# Patient Record
Sex: FEMALE | Race: ASIAN | NOT HISPANIC OR LATINO | Employment: OTHER | URBAN - NONMETROPOLITAN AREA
[De-identification: names, ages, dates, MRNs, and addresses within clinical notes are randomized per-mention and may not be internally consistent; named-entity substitution may affect disease eponyms.]

---

## 2017-01-03 ENCOUNTER — OFFICE VISIT (OUTPATIENT)
Dept: FAMILY MEDICINE CLINIC | Facility: CLINIC | Age: 72
End: 2017-01-03

## 2017-01-03 VITALS
DIASTOLIC BLOOD PRESSURE: 70 MMHG | OXYGEN SATURATION: 97 % | HEIGHT: 60 IN | BODY MASS INDEX: 24.94 KG/M2 | TEMPERATURE: 99.3 F | SYSTOLIC BLOOD PRESSURE: 120 MMHG | HEART RATE: 83 BPM | WEIGHT: 127 LBS

## 2017-01-03 DIAGNOSIS — R94.4 DECREASED GFR: ICD-10-CM

## 2017-01-03 DIAGNOSIS — M19.90 ARTHRITIS: ICD-10-CM

## 2017-01-03 DIAGNOSIS — J06.9 ACUTE URI: Primary | ICD-10-CM

## 2017-01-03 PROCEDURE — 99214 OFFICE O/P EST MOD 30 MIN: CPT | Performed by: NURSE PRACTITIONER

## 2017-01-03 RX ORDER — FLUTICASONE PROPIONATE 50 MCG
SPRAY, SUSPENSION (ML) NASAL
Qty: 48 ML | Refills: 5 | Status: SHIPPED | OUTPATIENT
Start: 2017-01-03 | End: 2017-01-25 | Stop reason: SDUPTHER

## 2017-01-03 RX ORDER — TRAMADOL HYDROCHLORIDE 50 MG/1
50 TABLET ORAL EVERY 8 HOURS PRN
Qty: 60 TABLET | Refills: 2 | Status: SHIPPED | OUTPATIENT
Start: 2017-01-03 | End: 2017-04-06 | Stop reason: SDUPTHER

## 2017-01-03 RX ORDER — CEFDINIR 300 MG/1
300 CAPSULE ORAL 2 TIMES DAILY
Qty: 20 CAPSULE | Refills: 0 | Status: SHIPPED | OUTPATIENT
Start: 2017-01-03 | End: 2017-01-13

## 2017-01-03 RX ORDER — FLUTICASONE PROPIONATE 50 MCG
1 SPRAY, SUSPENSION (ML) NASAL 2 TIMES DAILY
Qty: 1 EACH | Refills: 5 | Status: SHIPPED | OUTPATIENT
Start: 2017-01-03 | End: 2017-01-03 | Stop reason: SDUPTHER

## 2017-01-03 NOTE — PROGRESS NOTES
Subjective   Tyler Cabello is a 71 y.o. female.     History of Present Illness     URI  For past few weeks. Coughing up thick yellow and green sputum. She has taken OTC    Arthritis pain in back: She has had a neuro evaluation and Spinal surgery several years ago. Pain is located in her neck, mid and low back. She also has pain in her hands at times.   Tyler Cabello rates pain today as 4 on a scale of 0-10. Patient states that pain is reduced by at least one half with current Medication/Treatment. She states that use of ultram and prn motrin help her be more active. Pain does interfere with ability to perform daily activities as well as enjoyment of life. Patient reports quality of life and mobility has improved as a result of current treatment. Patient describes pain as sharp, stiffness and throbbing. Pain is made worse from going up and down stairs, rising after sitting, standing and walking. Pain is eased by heat, medication and rest. [facility] urine drug screen will be obtained as indicated. Tyler enjoys time with her grandchild and sowing .      Restless leg syndrome - patient with onset of restless leg syndrome approximately 5 years ago. The symptoms have been controlled with Requip. Patient states she has had a huge improvement in her quality of sleep with the addition of her Requip. She understands the risk.       Review recent labs - no history of decreased renal function. She does state that she has been taking daily ibuprofen to help ease her arthritis pain.       The following portions of the patient's history were reviewed and updated as appropriate: allergies, current medications, past family history, past medical history, past social history, past surgical history and problem list.    Review of Systems   Constitutional: Positive for chills. Negative for activity change, appetite change, fatigue and fever.   HENT: Positive for congestion and sinus pressure. Negative for ear pain, facial swelling, hearing  loss, sore throat, trouble swallowing and voice change.    Eyes: Negative for pain, discharge and visual disturbance.   Respiratory: Negative for apnea, cough, chest tightness, shortness of breath and wheezing.    Cardiovascular: Negative for chest pain, palpitations and leg swelling.   Gastrointestinal: Negative for abdominal pain, blood in stool, constipation, diarrhea, nausea and vomiting.   Genitourinary: Negative for dysuria.   Musculoskeletal: Positive for arthralgias, back pain and neck pain. Negative for neck stiffness.   Skin: Negative for color change.   Neurological: Negative for headaches.   Psychiatric/Behavioral: Negative for confusion and suicidal ideas. The patient is not nervous/anxious.    All other systems reviewed and are negative.      Objective   Physical Exam   Constitutional: She is oriented to person, place, and time. She appears well-developed and well-nourished. No distress.   HENT:   Head: Normocephalic and atraumatic.   Right Ear: External ear normal. Tympanic membrane is bulging.   Left Ear: External ear normal. Tympanic membrane is bulging.   Nose: Mucosal edema present. Right sinus exhibits maxillary sinus tenderness. Left sinus exhibits maxillary sinus tenderness.   Mouth/Throat: Oropharynx is clear and moist. No oropharyngeal exudate.   Very tender maxillary sinus bilateral    Eyes: Conjunctivae and EOM are normal. Pupils are equal, round, and reactive to light.   Neck: Normal range of motion. Neck supple. No thyromegaly present.   Cardiovascular: Normal rate, regular rhythm, normal heart sounds and intact distal pulses.    No murmur heard.  Pulmonary/Chest: Effort normal and breath sounds normal. No respiratory distress. She has no wheezes. She has no rales. She exhibits no tenderness.   Abdominal: Soft. Normal appearance and bowel sounds are normal. She exhibits no distension and no mass. There is no tenderness. There is no rebound and no guarding.   Musculoskeletal:        Lumbar  back: She exhibits decreased range of motion, tenderness, pain and spasm.   Decreased lumbar curvature, there is pain with forward flexion. DTR's +2. No edema.    Neurological: She is alert and oriented to person, place, and time. She has normal reflexes.   Skin: Skin is warm and dry. No rash noted. She is not diaphoretic. No erythema. No pallor.   Psychiatric: She has a normal mood and affect. Her speech is normal and behavior is normal. Judgment and thought content normal. Her affect is not inappropriate. Cognition and memory are normal.   Denies thoughts of hurting self or others.   Nursing note and vitals reviewed.      Assessment/Plan   Tyler was seen today for osteoarthritis, hypertension, restless legs syndrome and allergic rhinitis.    Diagnoses and all orders for this visit:    Acute URI    Arthritis  Comments:  body mechanics   refill ultram  aicha and uds on file  Orders:  -     traMADol (ULTRAM) 50 MG tablet; Take 1 tablet by mouth Every 8 (Eight) Hours As Needed for moderate pain (4-6).  -     Urine Drug Screen    Decreased GFR    Other orders  -     cefdinir (OMNICEF) 300 MG capsule; Take 1 capsule by mouth 2 (Two) Times a Day for 10 days.  -     Discontinue: fluticasone (FLONASE) 50 MCG/ACT nasal spray; 1 spray into each nostril 2 (Two) Times a Day. Administer 1 spray in each nostril for each dose.      Will repeat labs in three months. Stop all nsaid use as discussed today. Will refer as needed.  Labs reviewed and discussed.  I have discussed diagnosis in detail today allowing time for questions and answers. Pt is aware of reasons to seek urgent or emergent medical care as well as reasons to return to the clinic for evaluation. Possible side effects, interactions and progression of symptoms discussed as well. Pt / family states understanding.   Aicha and uds are on file as consistent. Repeat today.   Follow up 2-3 months, repeat labs one week prior.   RTC 2-5 days if not improved, sooner if  condition worsens/changes. Symptomatic care advised as well as reasons for urgent or emergent care. Pt / family state understanding.

## 2017-01-03 NOTE — MR AVS SNAPSHOT
Tyler Irineo   1/3/2017 2:00 PM   Office Visit    Dept Phone:  [phone]   Encounter #:  [id number]    Provider:  ZHOU Villar   Department:  [facility]                Your Full Care Plan              Today's Medication Changes          These changes are accurate as of: 1/3/17  2:26 PM.  If you have any questions, ask your nurse or doctor.               New Medication(s)Ordered:     cefdinir 300 MG capsule   Commonly known as:  OMNICEF   Take 1 capsule by mouth 2 (Two) Times a Day for 10 days.   Started by:  ZHOU Villar            Where to Get Your Medications      These medications were sent to [facility] [address] [place] [address] AT [facility] [address] [facility] - [phone]  - [phone]  [address] [facility] [address]     Phone:  [phone]     cefdinir 300 MG capsule    fluticasone 50 MCG/ACT nasal spray         You can get these medications from any pharmacy     Bring a paper prescription for each of these medications     traMADol 50 MG tablet                  Your Updated Medication List          This list is accurate as of: 1/3/17  2:26 PM.  Always use your most recent med list.                baclofen 10 MG tablet   Commonly known as:  LIORESAL   Take 1 tablet by mouth daily as needed for muscle spasms.       benzonatate 200 MG capsule   Commonly known as:  TESSALON   Take 1 capsule by mouth 3 (Three) Times a Day As Needed for cough.       cefdinir 300 MG capsule   Commonly known as:  OMNICEF   Take 1 capsule by mouth 2 (Two) Times a Day for 10 days.       cetirizine 10 MG tablet   Commonly known as:  zyrTEC   Take 1 tablet by mouth daily.       cyclobenzaprine 5 MG tablet   Commonly known as:  FLEXERIL   Take 1 tablet by mouth 2 (two) times a day as needed for muscle spasms.       esomeprazole 40 MG capsule   Commonly known as:  nexIUM   Take 1 capsule  by mouth daily.       fluticasone 50 MCG/ACT nasal spray   Commonly known as:  FLONASE   1 spray into each nostril 2 (Two) Times a Day. Administer 1 spray in each nostril for each dose.       furosemide 20 MG tablet   Commonly known as:  LASIX   Take 1 tablet by mouth daily as needed (for edema).       ibuprofen 800 MG tablet   Commonly known as:  ADVIL,MOTRIN   Take 1 tablet by mouth 3 (three) times a day as needed for mild pain (1-3) or moderate pain (4-6).       linaclotide 290 MCG capsule capsule   Commonly known as:  LINZESS   Take 290 mcg by mouth every morning before breakfast.       losartan 50 MG tablet   Commonly known as:  COZAAR   Take 1 tablet by mouth daily.       lubiprostone 8 MCG capsule   Commonly known as:  AMITIZA       montelukast 10 MG tablet   Commonly known as:  SINGULAIR   Take 1 tablet by mouth every night.       raNITIdine 150 MG tablet   Commonly known as:  ZANTAC   Take 1 tablet by mouth 2 (two) times a day.       rOPINIRole 1 MG tablet   Commonly known as:  REQUIP   Take 1 tablet by mouth every night. Take 1 hour before bedtime.       traMADol 50 MG tablet   Commonly known as:  ULTRAM   Take 1 tablet by mouth Every 8 (Eight) Hours As Needed for moderate pain (4-6).       verapamil  MG CR tablet   Commonly known as:  CALAN-SR   Take 1 tablet by mouth daily.               We Performed the Following     Urine Drug Screen       You Were Diagnosed With        Codes Comments    Arthritis     ICD-10-CM: M19.90  ICD-9-CM: 716.90 body mechanics   refill ultram  aicha and uds on file      Instructions     None    Patient Instructions History      Upcoming Appointments     Visit Type Date Time Department    OFFICE VISIT 1/3/2017  2:00 PM [facility]    LAB 3/27/2017  9:00 AM [facility]    OFFICE VISIT 4/3/2017  2:[facility]     Our records indicate that you have declined [facility] signup. If you would like to sign up for  "Demond, please email [email or web address] or call [phone] to obtain an activation code.             Other Info from Your Visit           Your Appointments     Mar 27, 2017  9:00 AM EDT   Lab with [facility] (--)    [address] [place] [id number] [phone]            Apr 03, 2017  2:00 PM EDT   Office Visit with ZHOU Villar   [facility] (--)    [address] [place] [id number] [phone]           Please arrive 10 minutes early, bring a complete list of all medications and bring any previous records or diagnostic testing results.              Allergies     Latex      Sulfa Antibiotics        Reason for Visit     Osteoarthritis     Hypertension     Restless Legs Syndrome     Allergic Rhinitis           Vital Signs     Blood Pressure Pulse Temperature Height Weight Last Menstrual Period    120/70 (BP Location: Right arm, Patient Position: Sitting) 83 99.3 °F (37.4 °C) (Tympanic) 60\" (152.4 cm) 127 lb (57.6 kg) 07/08/1986 (Within Months)    Oxygen Saturation Body Mass Index Smoking Status             97% 24.8 kg/m2 Former Smoker         Problems and Diagnoses Noted     Arthritis            "

## 2017-01-25 ENCOUNTER — OFFICE VISIT (OUTPATIENT)
Dept: FAMILY MEDICINE CLINIC | Facility: CLINIC | Age: 72
End: 2017-01-25

## 2017-01-25 VITALS
DIASTOLIC BLOOD PRESSURE: 90 MMHG | HEIGHT: 60 IN | TEMPERATURE: 100.7 F | HEART RATE: 95 BPM | OXYGEN SATURATION: 98 % | BODY MASS INDEX: 25.32 KG/M2 | SYSTOLIC BLOOD PRESSURE: 130 MMHG | WEIGHT: 129 LBS

## 2017-01-25 DIAGNOSIS — R68.89 FLU-LIKE SYMPTOMS: Primary | ICD-10-CM

## 2017-01-25 DIAGNOSIS — M19.90 ARTHRITIS: ICD-10-CM

## 2017-01-25 LAB
EXPIRATION DATE: NORMAL
EXPIRATION DATE: NORMAL
FLUAV AG NPH QL: NORMAL
FLUBV AG NPH QL: NORMAL
INTERNAL CONTROL: NORMAL
INTERNAL CONTROL: NORMAL
Lab: NORMAL
Lab: NORMAL
S PYO AG THROAT QL: NEGATIVE

## 2017-01-25 PROCEDURE — 87880 STREP A ASSAY W/OPTIC: CPT | Performed by: NURSE PRACTITIONER

## 2017-01-25 PROCEDURE — 87804 INFLUENZA ASSAY W/OPTIC: CPT | Performed by: NURSE PRACTITIONER

## 2017-01-25 PROCEDURE — 96372 THER/PROPH/DIAG INJ SC/IM: CPT | Performed by: NURSE PRACTITIONER

## 2017-01-25 PROCEDURE — 99213 OFFICE O/P EST LOW 20 MIN: CPT | Performed by: NURSE PRACTITIONER

## 2017-01-25 RX ORDER — KETOROLAC TROMETHAMINE 30 MG/ML
60 INJECTION, SOLUTION INTRAMUSCULAR; INTRAVENOUS ONCE
Status: COMPLETED | OUTPATIENT
Start: 2017-01-25 | End: 2017-01-25

## 2017-01-25 RX ORDER — FLUTICASONE PROPIONATE 50 MCG
1 SPRAY, SUSPENSION (ML) NASAL DAILY
Qty: 48 ML | Refills: 5 | Status: SHIPPED | OUTPATIENT
Start: 2017-01-25 | End: 2017-10-11

## 2017-01-25 RX ORDER — AMOXICILLIN AND CLAVULANATE POTASSIUM 500; 125 MG/1; MG/1
1 TABLET, FILM COATED ORAL 2 TIMES DAILY
Qty: 20 TABLET | Refills: 0 | Status: SHIPPED | OUTPATIENT
Start: 2017-01-25 | End: 2017-04-06

## 2017-01-25 RX ORDER — CEFTRIAXONE 1 G/1
1 INJECTION, POWDER, FOR SOLUTION INTRAMUSCULAR; INTRAVENOUS EVERY 24 HOURS
Status: DISCONTINUED | OUTPATIENT
Start: 2017-01-25 | End: 2017-04-06

## 2017-01-25 RX ORDER — IBUPROFEN 800 MG/1
800 TABLET ORAL EVERY 6 HOURS PRN
Qty: 30 TABLET | Refills: 0 | Status: SHIPPED | OUTPATIENT
Start: 2017-01-25 | End: 2017-04-06 | Stop reason: SDUPTHER

## 2017-01-25 RX ADMIN — CEFTRIAXONE 1 G: 1 INJECTION, POWDER, FOR SOLUTION INTRAMUSCULAR; INTRAVENOUS at 11:56

## 2017-01-25 RX ADMIN — KETOROLAC TROMETHAMINE 60 MG: 30 INJECTION, SOLUTION INTRAMUSCULAR; INTRAVENOUS at 11:58

## 2017-01-25 NOTE — MR AVS SNAPSHOT
Tyler Cabello   1/25/2017 10:00 AM   Office Visit    Dept Phone:  [phone]   Encounter #:  [id number]    Provider:  ZHOU Villar   Department:  [facility]                Your Full Care Plan              Your Updated Medication List          This list is accurate as of: 1/25/17 11:19 AM.  Always use your most recent med list.                baclofen 10 MG tablet   Commonly known as:  LIORESAL   Take 1 tablet by mouth daily as needed for muscle spasms.       benzonatate 200 MG capsule   Commonly known as:  TESSALON   Take 1 capsule by mouth 3 (Three) Times a Day As Needed for cough.       cetirizine 10 MG tablet   Commonly known as:  zyrTEC   Take 1 tablet by mouth daily.       cyclobenzaprine 5 MG tablet   Commonly known as:  FLEXERIL   Take 1 tablet by mouth 2 (two) times a day as needed for muscle spasms.       esomeprazole 40 MG capsule   Commonly known as:  nexIUM   Take 1 capsule by mouth daily.       fluticasone 50 MCG/ACT nasal spray   Commonly known as:  FLONASE   INSTILL 1 SPRAY IN EACH NOSTRIL TWICE DAILY       furosemide 20 MG tablet   Commonly known as:  LASIX   Take 1 tablet by mouth daily as needed (for edema).       ibuprofen 800 MG tablet   Commonly known as:  ADVIL,MOTRIN   Take 1 tablet by mouth 3 (three) times a day as needed for mild pain (1-3) or moderate pain (4-6).       linaclotide 290 MCG capsule capsule   Commonly known as:  LINZESS   Take 290 mcg by mouth every morning before breakfast.       losartan 50 MG tablet   Commonly known as:  COZAAR   Take 1 tablet by mouth daily.       lubiprostone 8 MCG capsule   Commonly known as:  AMITIZA       montelukast 10 MG tablet   Commonly known as:  SINGULAIR   Take 1 tablet by mouth every night.       raNITIdine 150 MG tablet   Commonly known as:  ZANTAC   Take 1 tablet by mouth 2 (two) times a day.       rOPINIRole 1 MG tablet   Commonly known as:  REQUIP   Take 1 tablet  "by mouth every night. Take 1 hour before bedtime.       traMADol 50 MG tablet   Commonly known as:  ULTRAM   Take 1 tablet by mouth Every 8 (Eight) Hours As Needed for moderate pain (4-6).       verapamil  MG CR tablet   Commonly known as:  CALAN-SR   Take 1 tablet by mouth daily.               Instructions     None    Patient Instructions History      Upcoming Appointments     Visit Type Date Time Department    OFFICE VISIT 1/25/2017 10:00 AM [facility]    LAB 3/27/2017  9:00 AM [facility]    OFFICE VISIT 4/3/2017  9:20 AM [facility]      MyChart Signup     Our records indicate that you have declined [facility] signup. If you would like to sign up for [employer], please email Personal [email or web address] or call [phone] to obtain an activation code.             Other Info from Your Visit           Your Appointments     Mar 27, 2017  9:00 AM EDT   Lab with [facility] (--)    [address] [place] [id number]   [phone]            Apr 03, 2017  9:20 AM EDT   Office Visit with ZHOU Villar   [facility] (--)    [address] [place] [address]   [phone]           Please arrive 10 minutes early, bring a complete list of all medications and bring any previous records or diagnostic testing results.              Allergies     Latex      Sulfa Antibiotics        Reason for Visit     URI           Vital Signs     Blood Pressure Pulse Temperature Height Weight    130/90 (BP Location: Left arm, Patient Position: Sitting, Cuff Size: Adult) 95 100.7 °F (38.2 °C) (Tympanic) 60\" (152.4 cm) 129 lb (58.5 kg)    Last Menstrual Period Oxygen Saturation Body Mass Index Smoking Status       07/08/1986 (Within Months) 98% 25.19 kg/m2 Former Smoker         "

## 2017-01-25 NOTE — PROGRESS NOTES
Cathryn Cabello is a 71 y.o. female.     History of Present Illness     Patient is here today complaining of sudden onset of flu like illness yesterday. She has taken some Tylenol cold medication today. Sore throat, denies trouble breathing or swallowing. Reports fever and body aches all over. Is drinking water, no appetite. No GI symptoms.       The following portions of the patient's history were reviewed and updated as appropriate: allergies, current medications, past family history, past medical history, past social history, past surgical history and problem list.    Review of Systems   Constitutional: Positive for activity change, appetite change, chills, fatigue and fever.   HENT: Positive for congestion, sore throat and voice change (hoarsness ). Negative for facial swelling, mouth sores and trouble swallowing.    Eyes: Visual disturbance: wears glasses, no acute changes in vision    Respiratory: Positive for cough (productive cough ). Negative for chest tightness, shortness of breath and wheezing.    Cardiovascular: Negative for chest pain, palpitations and leg swelling.   Gastrointestinal: Positive for nausea. Negative for abdominal pain, blood in stool, diarrhea and vomiting. Constipation: history of chronic constipation, she is having bowel movement at least every three days    Genitourinary: Negative for dysuria, flank pain and urgency.   Musculoskeletal: Positive for arthralgias, back pain, joint swelling (hands ) and neck pain. Negative for neck stiffness.   Skin: Negative for rash.   Allergic/Immunologic: Positive for environmental allergies. Negative for food allergies and immunocompromised state.   Neurological: Positive for weakness (all over ) and headaches (mild headache top of head for one day). Negative for syncope.   Hematological: Does not bruise/bleed easily.   Psychiatric/Behavioral: Negative for confusion, self-injury and suicidal ideas. The patient is not nervous/anxious.    All  other systems reviewed and are negative.      Objective   Physical Exam   Constitutional: She is oriented to person, place, and time. She appears well-developed and well-nourished. She appears ill. No distress.   Febrile and mildly elevated blood pressure    HENT:   Head: Normocephalic and atraumatic.   Right Ear: Hearing, tympanic membrane, external ear and ear canal normal.   Left Ear: Hearing, tympanic membrane, external ear and ear canal normal.   Nose: Mucosal edema present. Right sinus exhibits maxillary sinus tenderness. Left sinus exhibits maxillary sinus tenderness.   Mouth/Throat: Uvula is midline. Oropharyngeal exudate (gray exudate ) and posterior oropharyngeal erythema present. No posterior oropharyngeal edema.   Eyes: Conjunctivae, EOM and lids are normal. Pupils are equal, round, and reactive to light.   Neck: Trachea normal and normal range of motion. Neck supple. Muscular tenderness present. No rigidity. No thyromegaly present.   Cardiovascular: Normal rate, regular rhythm, normal heart sounds and intact distal pulses.    No murmur heard.  Pulmonary/Chest: Effort normal and breath sounds normal. No respiratory distress. She has no wheezes. She has no rales. She exhibits no tenderness.   Abdominal: Soft. Normal appearance and bowel sounds are normal. She exhibits no distension and no mass. There is no tenderness. There is no rebound and no guarding.   Musculoskeletal:        Lumbar back: She exhibits decreased range of motion, tenderness, pain and spasm.   Decreased lumbar curvature, there is pain with forward flexion. DTR's +2. No edema.    Neurological: She is alert and oriented to person, place, and time. She has normal reflexes.   Skin: Skin is warm and dry. No rash noted. She is not diaphoretic. No erythema. No pallor.   Psychiatric: She has a normal mood and affect. Her speech is normal and behavior is normal. Judgment and thought content normal. Her affect is not inappropriate. Cognition and  memory are normal.   Denies thoughts of hurting self or others.   Nursing note and vitals reviewed.      Assessment/Plan   Tyler was seen today for uri.    Diagnoses and all orders for this visit:    Flu-like symptoms  -     POC Influenza A / B - negative though pt presents as Influenza  -     POC Rapid Strep A - negative  -     ibuprofen (ADVIL,MOTRIN) 800 MG tablet; Take 1 tablet by mouth Every 6 (Six) Hours As Needed for fever.  Toradol 60 mg IM today for body aches.  Rocephin 1 gram IM today, tolerated well.     Other orders  -     amoxicillin-clavulanate (AUGMENTIN) 500-125 MG per tablet; Take 1 tablet by mouth 2 (Two) Times a Day.  -     fluticasone (FLONASE) 50 MCG/ACT nasal spray; 1 spray into each nostril Daily.      Steam therapy, rest, fluids, symptomatic treatment. Rotate tylenol and motrin for body aches and fever.   Reviewed disease process, possible complications, reasons for urgent care and possible side effects of medications. Pt/family state understanding.   RTC 2-5 days if not improved, sooner if condition worsens/changes. Symptomatic care advised as well as reasons for urgent or emergent care. Pt / family state understanding.   I have instructed pt if her symptoms worsen or change to call 911 or go immediately to the nearest emergency room.

## 2017-03-27 ENCOUNTER — LAB (OUTPATIENT)
Dept: FAMILY MEDICINE CLINIC | Facility: CLINIC | Age: 72
End: 2017-03-27

## 2017-03-27 DIAGNOSIS — R94.4 DECREASED GFR: ICD-10-CM

## 2017-03-27 DIAGNOSIS — G25.81 RESTLESS LEG SYNDROME: ICD-10-CM

## 2017-03-27 DIAGNOSIS — M19.90 ARTHRITIS: ICD-10-CM

## 2017-03-27 LAB
ALBUMIN SERPL-MCNC: 4.5 G/DL (ref 3.4–4.8)
ALBUMIN/GLOB SERPL: 1.8 G/DL (ref 1.5–2.5)
ALP SERPL-CCNC: 55 U/L (ref 35–104)
ALT SERPL W P-5'-P-CCNC: 23 U/L (ref 10–36)
ANION GAP SERPL CALCULATED.3IONS-SCNC: 2.5 MMOL/L (ref 3.6–11.2)
AST SERPL-CCNC: 22 U/L (ref 10–30)
BASOPHILS # BLD AUTO: 0.05 10*3/MM3 (ref 0–0.3)
BASOPHILS NFR BLD AUTO: 0.8 % (ref 0–2)
BILIRUB SERPL-MCNC: 0.3 MG/DL (ref 0.2–1.8)
BUN BLD-MCNC: 17 MG/DL (ref 7–21)
BUN/CREAT SERPL: 19.5 (ref 7–25)
CALCIUM SPEC-SCNC: 9.6 MG/DL (ref 7.7–10)
CHLORIDE SERPL-SCNC: 110 MMOL/L (ref 99–112)
CHOLEST SERPL-MCNC: 197 MG/DL (ref 0–200)
CO2 SERPL-SCNC: 29.5 MMOL/L (ref 24.3–31.9)
CREAT BLD-MCNC: 0.87 MG/DL (ref 0.43–1.29)
DEPRECATED RDW RBC AUTO: 44 FL (ref 37–54)
EOSINOPHIL # BLD AUTO: 0.14 10*3/MM3 (ref 0–0.7)
EOSINOPHIL NFR BLD AUTO: 2.3 % (ref 0–7)
ERYTHROCYTE [DISTWIDTH] IN BLOOD BY AUTOMATED COUNT: 13.3 % (ref 11.5–14.5)
GFR SERPL CREATININE-BSD FRML MDRD: 64 ML/MIN/1.73
GLOBULIN UR ELPH-MCNC: 2.5 GM/DL
GLUCOSE BLD-MCNC: 104 MG/DL (ref 70–110)
HBA1C MFR BLD: 6.2 % (ref 4.5–5.7)
HCT VFR BLD AUTO: 40.4 % (ref 37–47)
HDLC SERPL-MCNC: 46 MG/DL (ref 60–100)
HGB BLD-MCNC: 13.1 G/DL (ref 12–16)
IMM GRANULOCYTES # BLD: 0 10*3/MM3 (ref 0–0.03)
IMM GRANULOCYTES NFR BLD: 0 % (ref 0–0.5)
LDLC SERPL CALC-MCNC: 131 MG/DL (ref 0–100)
LDLC/HDLC SERPL: 2.85 {RATIO}
LYMPHOCYTES # BLD AUTO: 2.55 10*3/MM3 (ref 1–3)
LYMPHOCYTES NFR BLD AUTO: 42.1 % (ref 16–46)
MCH RBC QN AUTO: 30.6 PG (ref 27–33)
MCHC RBC AUTO-ENTMCNC: 32.4 G/DL (ref 33–37)
MCV RBC AUTO: 94.4 FL (ref 80–94)
MONOCYTES # BLD AUTO: 0.44 10*3/MM3 (ref 0.1–0.9)
MONOCYTES NFR BLD AUTO: 7.3 % (ref 0–12)
NEUTROPHILS # BLD AUTO: 2.87 10*3/MM3 (ref 1.4–6.5)
NEUTROPHILS NFR BLD AUTO: 47.5 % (ref 40–75)
OSMOLALITY SERPL CALC.SUM OF ELEC: 285 MOSM/KG (ref 273–305)
PLATELET # BLD AUTO: 296 10*3/MM3 (ref 130–400)
PMV BLD AUTO: 11.1 FL (ref 6–10)
POTASSIUM BLD-SCNC: 4.5 MMOL/L (ref 3.5–5.3)
PROT SERPL-MCNC: 7 G/DL (ref 6–8)
RBC # BLD AUTO: 4.28 10*6/MM3 (ref 4.2–5.4)
SODIUM BLD-SCNC: 142 MMOL/L (ref 135–153)
TRIGL SERPL-MCNC: 99 MG/DL (ref 0–150)
VLDLC SERPL-MCNC: 19.8 MG/DL
WBC NRBC COR # BLD: 6.05 10*3/MM3 (ref 4.5–12.5)

## 2017-03-27 PROCEDURE — 85025 COMPLETE CBC W/AUTO DIFF WBC: CPT | Performed by: NURSE PRACTITIONER

## 2017-03-27 PROCEDURE — 80061 LIPID PANEL: CPT | Performed by: NURSE PRACTITIONER

## 2017-03-27 PROCEDURE — 83036 HEMOGLOBIN GLYCOSYLATED A1C: CPT | Performed by: NURSE PRACTITIONER

## 2017-03-27 PROCEDURE — 36415 COLL VENOUS BLD VENIPUNCTURE: CPT

## 2017-03-27 PROCEDURE — 80053 COMPREHEN METABOLIC PANEL: CPT | Performed by: NURSE PRACTITIONER

## 2017-03-27 RX ORDER — ROPINIROLE 1 MG/1
TABLET, FILM COATED ORAL
Qty: 30 TABLET | Refills: 3 | Status: SHIPPED | OUTPATIENT
Start: 2017-03-27 | End: 2017-07-11 | Stop reason: SDUPTHER

## 2017-04-06 ENCOUNTER — OFFICE VISIT (OUTPATIENT)
Dept: FAMILY MEDICINE CLINIC | Facility: CLINIC | Age: 72
End: 2017-04-06

## 2017-04-06 ENCOUNTER — TELEPHONE (OUTPATIENT)
Dept: FAMILY MEDICINE CLINIC | Facility: CLINIC | Age: 72
End: 2017-04-06

## 2017-04-06 VITALS
TEMPERATURE: 98.4 F | DIASTOLIC BLOOD PRESSURE: 80 MMHG | HEIGHT: 60 IN | BODY MASS INDEX: 26.31 KG/M2 | SYSTOLIC BLOOD PRESSURE: 140 MMHG | WEIGHT: 134 LBS | OXYGEN SATURATION: 98 % | HEART RATE: 75 BPM

## 2017-04-06 DIAGNOSIS — G25.81 RLS (RESTLESS LEGS SYNDROME): ICD-10-CM

## 2017-04-06 DIAGNOSIS — J30.2 OTHER SEASONAL ALLERGIC RHINITIS: ICD-10-CM

## 2017-04-06 DIAGNOSIS — Z79.899 HIGH RISK MEDICATION USE: Primary | ICD-10-CM

## 2017-04-06 DIAGNOSIS — I10 ESSENTIAL HYPERTENSION: ICD-10-CM

## 2017-04-06 DIAGNOSIS — J45.20 MILD INTERMITTENT ASTHMA WITHOUT COMPLICATION: ICD-10-CM

## 2017-04-06 DIAGNOSIS — S09.90XS HEADACHES DUE TO OLD HEAD TRAUMA: ICD-10-CM

## 2017-04-06 DIAGNOSIS — E11.9 TYPE 2 DIABETES MELLITUS WITHOUT COMPLICATION, WITHOUT LONG-TERM CURRENT USE OF INSULIN (HCC): ICD-10-CM

## 2017-04-06 DIAGNOSIS — M19.90 ARTHRITIS: ICD-10-CM

## 2017-04-06 DIAGNOSIS — G25.81 RESTLESS LEG SYNDROME: ICD-10-CM

## 2017-04-06 DIAGNOSIS — E78.2 MIXED HYPERLIPIDEMIA: ICD-10-CM

## 2017-04-06 DIAGNOSIS — K21.00 GASTROESOPHAGEAL REFLUX DISEASE WITH ESOPHAGITIS: ICD-10-CM

## 2017-04-06 DIAGNOSIS — K59.01 SLOW TRANSIT CONSTIPATION: ICD-10-CM

## 2017-04-06 DIAGNOSIS — G44.309 HEADACHES DUE TO OLD HEAD TRAUMA: ICD-10-CM

## 2017-04-06 PROBLEM — E78.5 HYPERLIPIDEMIA: Status: ACTIVE | Noted: 2017-04-06

## 2017-04-06 PROBLEM — J45.909 ASTHMA: Status: ACTIVE | Noted: 2017-04-06

## 2017-04-06 PROBLEM — K59.00 CONSTIPATION: Status: ACTIVE | Noted: 2017-04-06

## 2017-04-06 PROCEDURE — 99214 OFFICE O/P EST MOD 30 MIN: CPT | Performed by: NURSE PRACTITIONER

## 2017-04-06 RX ORDER — FUROSEMIDE 20 MG/1
TABLET ORAL
Qty: 90 TABLET | Refills: 5 | Status: SHIPPED | OUTPATIENT
Start: 2017-04-06 | End: 2017-07-11 | Stop reason: SDDI

## 2017-04-06 RX ORDER — LOSARTAN POTASSIUM 50 MG/1
50 TABLET ORAL DAILY
Qty: 30 TABLET | Refills: 5 | Status: SHIPPED | OUTPATIENT
Start: 2017-04-06 | End: 2017-04-06 | Stop reason: SDUPTHER

## 2017-04-06 RX ORDER — BENZONATATE 200 MG/1
200 CAPSULE ORAL 3 TIMES DAILY PRN
Qty: 30 CAPSULE | Refills: 2 | Status: SHIPPED | OUTPATIENT
Start: 2017-04-06 | End: 2017-07-11

## 2017-04-06 RX ORDER — TRAMADOL HYDROCHLORIDE 50 MG/1
50 TABLET ORAL EVERY 8 HOURS PRN
Qty: 60 TABLET | Refills: 2 | Status: SHIPPED | OUTPATIENT
Start: 2017-04-06 | End: 2017-07-11 | Stop reason: SDUPTHER

## 2017-04-06 RX ORDER — CETIRIZINE HYDROCHLORIDE 10 MG/1
10 TABLET ORAL DAILY
Qty: 30 TABLET | Refills: 5 | Status: SHIPPED | OUTPATIENT
Start: 2017-04-06 | End: 2017-07-11 | Stop reason: SDUPTHER

## 2017-04-06 RX ORDER — RANITIDINE 150 MG/1
150 TABLET ORAL 2 TIMES DAILY
Qty: 60 TABLET | Refills: 5 | Status: SHIPPED | OUTPATIENT
Start: 2017-04-06 | End: 2017-04-06 | Stop reason: SDUPTHER

## 2017-04-06 RX ORDER — RANITIDINE 150 MG/1
TABLET ORAL
Qty: 180 TABLET | Refills: 5 | Status: SHIPPED | OUTPATIENT
Start: 2017-04-06 | End: 2017-07-11 | Stop reason: SDUPTHER

## 2017-04-06 RX ORDER — FUROSEMIDE 20 MG/1
20 TABLET ORAL DAILY PRN
Qty: 30 TABLET | Refills: 5 | Status: SHIPPED | OUTPATIENT
Start: 2017-04-06 | End: 2017-04-06 | Stop reason: SDUPTHER

## 2017-04-06 RX ORDER — BACLOFEN 10 MG/1
10 TABLET ORAL DAILY PRN
Qty: 30 TABLET | Refills: 5 | Status: SHIPPED | OUTPATIENT
Start: 2017-04-06 | End: 2017-07-11 | Stop reason: SDUPTHER

## 2017-04-06 RX ORDER — ECHINACEA PURPUREA EXTRACT 125 MG
2 TABLET ORAL AS NEEDED
Qty: 1 EACH | Refills: 3 | Status: SHIPPED | OUTPATIENT
Start: 2017-04-06 | End: 2017-10-11

## 2017-04-06 RX ORDER — IBUPROFEN 800 MG/1
800 TABLET ORAL EVERY 6 HOURS PRN
Qty: 30 TABLET | Refills: 0 | Status: SHIPPED | OUTPATIENT
Start: 2017-04-06 | End: 2017-07-11 | Stop reason: SDUPTHER

## 2017-04-06 RX ORDER — LOSARTAN POTASSIUM 50 MG/1
TABLET ORAL
Qty: 90 TABLET | Refills: 5 | Status: SHIPPED | OUTPATIENT
Start: 2017-04-06 | End: 2017-07-11

## 2017-04-06 RX ORDER — CYCLOBENZAPRINE HCL 5 MG
5 TABLET ORAL 2 TIMES DAILY PRN
Qty: 60 TABLET | Refills: 5 | Status: SHIPPED | OUTPATIENT
Start: 2017-04-06 | End: 2017-07-11 | Stop reason: SDDI

## 2017-04-06 RX ORDER — AZITHROMYCIN 250 MG/1
TABLET, FILM COATED ORAL
Qty: 6 TABLET | Refills: 0 | Status: SHIPPED | OUTPATIENT
Start: 2017-04-06 | End: 2017-07-11

## 2017-04-06 NOTE — PROGRESS NOTES
Subjective   Tyler Cabello is a 71 y.o. female.     History of Present Illness     Follow up on labs with some new concerns.    Headaches and tingling in her head at random times. Tyler reports a head injury over 20 years ago when she slipped and fell at home hitting her head on a rock. Her  carried her home, told her she was unconscious for approximately 1-2 minutes. She at times has had random headaches over the past and now is having tingling and memory loss. Memory loss seems to be short term memory, is worse during periods of headaches. Headaches are in the occipital region, tender to touch at times and feel like an electric shock. She did not seek health care at time of fall as she did not have health insurance.     Arthritis pain in back: She has had a neuro evaluation and Spinal surgery several years ago. Pain is located in her neck, mid and low back. She also has pain in her hands at times.   Tyler Cabello rates pain today as 4 on a scale of 0-10. Patient states that pain is reduced by at least one half with current Medication/Treatment. She states that use of ultram and prn motrin help her be more active. Pain does interfere with ability to perform daily activities as well as enjoyment of life. Patient reports quality of life and mobility has improved as a result of current treatment. Patient describes pain as sharp, stiffness and throbbing. Pain is made worse from going up and down stairs, rising after sitting, standing and walking. Pain is eased by heat, medication and rest. [facility] urine drug screen will be obtained as indicated. Tyler enjoys time with her grandchild and sowing . She enjoys spring and summer so she can enjoy time outside.       Restless leg syndrome -  The symptoms have been controlled with Requip. Patient states she has had a huge improvement in her quality of sleep with the addition of her Requip. She understands the risk.  Hypertension- stable with current medication.  Monitors  randomly with readings reported in normal ranges.     DM- controlled with diet changes. Improved A1C.   Constipation - stable with use of Linzess . No side effects.   Gerd- stable with zantac. Tries to avoid spicy foods.         The following portions of the patient's history were reviewed and updated as appropriate: allergies, current medications, past family history, past medical history, past social history, past surgical history and problem list.    Review of Systems   Constitutional: Positive for fatigue. Negative for activity change, chills, fever and unexpected weight change.   HENT: Positive for sinus pressure and sore throat. Negative for ear pain.    Eyes: Positive for visual disturbance (wears glasses , most recent eye exam with in one year ).   Respiratory: Negative for cough, chest tightness, shortness of breath and wheezing.    Endocrine: Negative.    Genitourinary: Negative for dysuria.   Musculoskeletal: Positive for arthralgias, back pain and neck pain. Negative for neck stiffness.   Allergic/Immunologic: Positive for environmental allergies.   Neurological: Positive for headaches. Negative for speech difficulty.   Psychiatric/Behavioral: Positive for sleep disturbance (due to arthritis pain ). Negative for self-injury and suicidal ideas. The patient is not nervous/anxious.        Objective   Physical Exam   Constitutional: She is oriented to person, place, and time. She appears well-developed and well-nourished. No distress.   HENT:   Head: Normocephalic and atraumatic.   Right Ear: External ear normal.   Left Ear: External ear normal.   Nose: Mucosal edema and sinus tenderness present. Right sinus exhibits maxillary sinus tenderness. Left sinus exhibits maxillary sinus tenderness.   Mouth/Throat: Oropharynx is clear and moist. No oropharyngeal exudate.   Eyes: EOM are normal. Pupils are equal, round, and reactive to light.   Neck: Normal range of motion. Neck supple. No thyromegaly present.    Cardiovascular: Normal rate, regular rhythm, normal heart sounds and intact distal pulses.    No murmur heard.  Pulmonary/Chest: Effort normal and breath sounds normal. No respiratory distress. She has no wheezes. She has no rales. She exhibits no tenderness.   Abdominal: Soft. Bowel sounds are normal. She exhibits no distension and no mass. There is no tenderness. There is no rebound and no guarding.   Musculoskeletal:   Decreased lumbar curvature, there is pain with forward flexion. DTR's +2. No edema.    Lymphadenopathy:     She has no cervical adenopathy.     She has no axillary adenopathy.   Neurological: She is alert and oriented to person, place, and time. She has normal reflexes. No cranial nerve deficit.   Skin: Skin is warm and dry. No rash noted. She is not diaphoretic. No erythema. No pallor.   Psychiatric: She has a normal mood and affect. Her speech is normal and behavior is normal. Judgment and thought content normal. Her affect is not inappropriate. Cognition and memory are normal.   Denies thoughts of hurting self or others.   Nursing note and vitals reviewed.      Assessment/Plan   Tyler was seen today for diabetes, hypertension and hyperlipidemia.    Diagnoses and all orders for this visit:    High risk medication use  -     Urine Drug Screen    Arthritis  Comments:  body mechanics   refill ultram  aicha and uds on file  Orders:  -     traMADol (ULTRAM) 50 MG tablet; Take 1 tablet by mouth Every 8 (Eight) Hours As Needed for Moderate Pain (4-6).  -     ibuprofen (ADVIL,MOTRIN) 800 MG tablet; Take 1 tablet by mouth Every 6 (Six) Hours As Needed for Fever.    Restless leg syndrome  -     baclofen (LIORESAL) 10 MG tablet; Take 1 tablet by mouth Daily As Needed for Muscle Spasms.  -     cyclobenzaprine (FLEXERIL) 5 MG tablet; Take 1 tablet by mouth 2 (Two) Times a Day As Needed for Muscle Spasms.    Other seasonal allergic rhinitis  Comments:  stable  Orders:  -     cetirizine (zyrTEC) 10 MG tablet;  Take 1 tablet by mouth Daily.    Slow transit constipation  -     linaclotide (LINZESS) 290 MCG capsule capsule; Take 290 mcg by mouth Every Morning Before Breakfast.    Essential hypertension  -     losartan (COZAAR) 50 MG tablet; Take 1 tablet by mouth Daily.    Mild intermittent asthma without complication    Gastroesophageal reflux disease with esophagitis    Mixed hyperlipidemia    RLS (restless legs syndrome)    Type 2 diabetes mellitus without complication, without long-term current use of insulin    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  -     CT head w contrast; Future    Other orders  -     azithromycin (ZITHROMAX Z-KELLEE) 250 MG tablet; Take 2 tablets the first day, then 1 tablet daily for 4 days.  -     sodium chloride (OCEAN NASAL SPRAY) 0.65 % nasal spray; 2 sprays into each nostril As Needed for Congestion.  -     raNITIdine (ZANTAC) 150 MG tablet; Take 1 tablet by mouth 2 (Two) Times a Day.  -     benzonatate (TESSALON) 200 MG capsule; Take 1 capsule by mouth 3 (Three) Times a Day As Needed for Cough.  -     furosemide (LASIX) 20 MG tablet; Take 1 tablet by mouth Daily As Needed (for edema).      Acute Sinusitis   Fluids, rest, symptomatic treatment advised. Discussed symptoms to report as well as reasons to seek urgent or emergent medical attention. Understanding stated. Steam therapy. RTC 2-3 days if not improved.     I have discussed diagnosis in detail today allowing time for questions and answers. Pt is aware of reasons to seek urgent or emergent medical care as well as reasons to return to the clinic for evaluation. Possible side effects, interactions and progression of symptoms discussed as well. Pt / family states understanding.   Diaz and uds are on file as consistent.   Repeat UDS today.   Goal: improved quality of life and maintain mobility.   Schedule for CT of head with contrast, will consider neurology consult if symptoms continue.   Follow up 1-3 months, sooner if needed.

## 2017-04-06 NOTE — TELEPHONE ENCOUNTER
----- Message from ZHOU Villar sent at 4/6/2017 10:17 AM EDT -----  Please save as telephone message.   Pt needs CT of head with contrast at [facility]. Please schedule.     mb

## 2017-04-13 ENCOUNTER — HOSPITAL ENCOUNTER (OUTPATIENT)
Dept: CT IMAGING | Facility: HOSPITAL | Age: 72
Discharge: HOME | End: 2017-04-13
Admitting: NURSE PRACTITIONER

## 2017-04-13 DIAGNOSIS — G44.309 HEADACHES DUE TO OLD HEAD TRAUMA: ICD-10-CM

## 2017-04-13 DIAGNOSIS — S09.90XS HEADACHES DUE TO OLD HEAD TRAUMA: ICD-10-CM

## 2017-04-13 PROCEDURE — 70460 CT HEAD/BRAIN W/DYE: CPT | Performed by: RADIOLOGY

## 2017-04-13 PROCEDURE — 70460 CT HEAD/BRAIN W/DYE: CPT

## 2017-04-13 PROCEDURE — 0 IOPAMIDOL 61 % SOLUTION: Performed by: NURSE PRACTITIONER

## 2017-04-13 RX ADMIN — IOPAMIDOL 100 ML: 612 INJECTION, SOLUTION INTRAVENOUS at 11:00

## 2017-05-01 DIAGNOSIS — I10 ESSENTIAL HYPERTENSION: ICD-10-CM

## 2017-05-02 RX ORDER — VERAPAMIL HYDROCHLORIDE 240 MG/1
TABLET, FILM COATED, EXTENDED RELEASE ORAL
Qty: 30 TABLET | Refills: 5 | Status: SHIPPED | OUTPATIENT
Start: 2017-05-02 | End: 2017-07-11 | Stop reason: SDUPTHER

## 2017-05-30 ENCOUNTER — TELEPHONE (OUTPATIENT)
Dept: FAMILY MEDICINE CLINIC | Facility: CLINIC | Age: 72
End: 2017-05-30

## 2017-07-11 ENCOUNTER — OFFICE VISIT (OUTPATIENT)
Dept: FAMILY MEDICINE CLINIC | Facility: CLINIC | Age: 72
End: 2017-07-11

## 2017-07-11 VITALS
BODY MASS INDEX: 25.91 KG/M2 | DIASTOLIC BLOOD PRESSURE: 90 MMHG | TEMPERATURE: 98.7 F | HEIGHT: 60 IN | HEART RATE: 74 BPM | SYSTOLIC BLOOD PRESSURE: 140 MMHG | OXYGEN SATURATION: 98 % | WEIGHT: 132 LBS

## 2017-07-11 DIAGNOSIS — E55.9 VITAMIN D DEFICIENCY: ICD-10-CM

## 2017-07-11 DIAGNOSIS — J30.2 OTHER SEASONAL ALLERGIC RHINITIS: ICD-10-CM

## 2017-07-11 DIAGNOSIS — H66.92 OTITIS, LEFT: ICD-10-CM

## 2017-07-11 DIAGNOSIS — G25.81 RESTLESS LEG SYNDROME: ICD-10-CM

## 2017-07-11 DIAGNOSIS — G25.81 RLS (RESTLESS LEGS SYNDROME): ICD-10-CM

## 2017-07-11 DIAGNOSIS — Z79.899 HIGH RISK MEDICATION USE: Primary | ICD-10-CM

## 2017-07-11 DIAGNOSIS — E78.2 MIXED HYPERLIPIDEMIA: ICD-10-CM

## 2017-07-11 DIAGNOSIS — M19.90 ARTHRITIS: ICD-10-CM

## 2017-07-11 DIAGNOSIS — E11.9 TYPE 2 DIABETES MELLITUS WITHOUT COMPLICATION, WITHOUT LONG-TERM CURRENT USE OF INSULIN (HCC): ICD-10-CM

## 2017-07-11 DIAGNOSIS — K59.00 CONSTIPATION, UNSPECIFIED CONSTIPATION TYPE: ICD-10-CM

## 2017-07-11 DIAGNOSIS — J45.20 MILD INTERMITTENT ASTHMA WITHOUT COMPLICATION: ICD-10-CM

## 2017-07-11 PROCEDURE — 99214 OFFICE O/P EST MOD 30 MIN: CPT | Performed by: NURSE PRACTITIONER

## 2017-07-11 RX ORDER — CEFDINIR 300 MG/1
300 CAPSULE ORAL 2 TIMES DAILY
Qty: 20 CAPSULE | Refills: 0 | Status: SHIPPED | OUTPATIENT
Start: 2017-07-11 | End: 2017-07-21

## 2017-07-11 RX ORDER — PSEUDOEPHEDRINE HYDROCHLORIDE 60 MG/1
60 TABLET, FILM COATED ORAL 2 TIMES DAILY PRN
Qty: 30 TABLET | Refills: 1 | Status: SHIPPED | OUTPATIENT
Start: 2017-07-11 | End: 2017-10-11

## 2017-07-11 RX ORDER — VERAPAMIL HYDROCHLORIDE 240 MG/1
240 TABLET, FILM COATED, EXTENDED RELEASE ORAL NIGHTLY
Qty: 90 TABLET | Refills: 3 | Status: SHIPPED | OUTPATIENT
Start: 2017-07-11 | End: 2017-10-11 | Stop reason: SDUPTHER

## 2017-07-11 RX ORDER — RANITIDINE 150 MG/1
150 TABLET ORAL 2 TIMES DAILY
Qty: 180 TABLET | Refills: 5 | Status: SHIPPED | OUTPATIENT
Start: 2017-07-11 | End: 2017-10-11 | Stop reason: SDUPTHER

## 2017-07-11 RX ORDER — TRIAMCINOLONE ACETONIDE 1 MG/G
CREAM TOPICAL 2 TIMES DAILY
Qty: 80 G | Refills: 5 | Status: SHIPPED | OUTPATIENT
Start: 2017-07-11 | End: 2017-10-11

## 2017-07-11 RX ORDER — MONTELUKAST SODIUM 10 MG/1
10 TABLET ORAL NIGHTLY
Qty: 30 TABLET | Refills: 5 | Status: SHIPPED | OUTPATIENT
Start: 2017-07-11 | End: 2017-10-11 | Stop reason: SDUPTHER

## 2017-07-11 RX ORDER — ROPINIROLE 1 MG/1
1 TABLET, FILM COATED ORAL NIGHTLY
Qty: 90 TABLET | Refills: 3 | Status: SHIPPED | OUTPATIENT
Start: 2017-07-11 | End: 2017-10-11 | Stop reason: SDUPTHER

## 2017-07-11 RX ORDER — TRAMADOL HYDROCHLORIDE 50 MG/1
50 TABLET ORAL EVERY 8 HOURS PRN
Qty: 60 TABLET | Refills: 2 | Status: SHIPPED | OUTPATIENT
Start: 2017-07-11 | End: 2017-10-11 | Stop reason: SDUPTHER

## 2017-07-11 RX ORDER — CETIRIZINE HYDROCHLORIDE 10 MG/1
10 TABLET ORAL DAILY
Qty: 30 TABLET | Refills: 5 | Status: SHIPPED | OUTPATIENT
Start: 2017-07-11 | End: 2017-10-11 | Stop reason: SDUPTHER

## 2017-07-11 RX ORDER — IBUPROFEN 800 MG/1
800 TABLET ORAL EVERY 8 HOURS PRN
Qty: 30 TABLET | Refills: 3 | Status: SHIPPED | OUTPATIENT
Start: 2017-07-11 | End: 2017-10-11 | Stop reason: SDUPTHER

## 2017-07-11 RX ORDER — BACLOFEN 10 MG/1
10 TABLET ORAL DAILY PRN
Qty: 30 TABLET | Refills: 5 | Status: SHIPPED | OUTPATIENT
Start: 2017-07-11 | End: 2017-10-11 | Stop reason: SDUPTHER

## 2017-07-11 NOTE — PROGRESS NOTES
Cathryn Cabello is a 72 y.o. female.     No chief complaint on file.      History of Present Illness   Mrs. Toth is here today for a routine follow-up visit.  She has a new list of medications as she has stopped several routine medications her herself.  She states the reason as just wanting to get off of so many medications.    Hypertension - does check her blood pressure randomly at home or community with readings reported within acceptable range.    Type 2 diabetes - no hyperglycemic or hypoglycemic symptomatic episodes.  Patient monitors her glucose level randomly with acceptable readings.  She tries to control her type 2 diabetes with diet and lifestyle.    Chronic osteoarthritis pain - rates her pain as 6 on a pain scale rating of 0-10.  Pain is located in her neck and low back and hands.  Her pain has been present for many years.  She has had spinal surgery in the past.  She has been seen by or throat and neurology in the past.  She does at times take ibuprofen for mild pain relief.  Her pain is described as stiffness, aching, and sharp at times.  Pain is worse with overuse of associated joints.  Rest and heat help only minimally.  Her pain does at times prevent her from doing the things she enjoys such as sewing, yardwork and playing with her granddaughter.  She reports that Ultram does ease her pain and stiffness, allowing her to better in enjoy her life. She has no history of substance abuse or addiction.     Acute complaint of left ear pain for greater then 4 weeks.  Pain started as a throbbing in her left ear and has gradually became a sharp stabbing pain.  She does report a low grade fever over the past week and increased sinus pressure with drainage.    Chronic allergy symptoms - some acute exacerbation this summer.        The following portions of the patient's history were reviewed and updated as appropriate: allergies, current medications, past family history, past medical history, past  "social history, past surgical history and problem list.    Review of Systems   Constitutional: Positive for activity change and fever. Negative for appetite change, chills, fatigue and unexpected weight change.   HENT: Positive for ear pain, sinus pressure and sneezing. Negative for congestion.    Eyes: Positive for visual disturbance (Wears glasses).   Respiratory: Negative for cough, choking, shortness of breath and wheezing.    Cardiovascular: Negative for chest pain, palpitations and leg swelling.   Gastrointestinal: Negative for abdominal pain, blood in stool, diarrhea, nausea and vomiting.   Endocrine: Negative.    Genitourinary: Negative for dysuria.   Musculoskeletal: Positive for arthralgias, back pain, myalgias and neck pain. Negative for neck stiffness.   Skin: Negative for rash.   Allergic/Immunologic: Positive for environmental allergies. Negative for food allergies.   Neurological: Positive for dizziness (With the onset of left ear pain) and headaches (Radiating from left ear).   Hematological: Positive for adenopathy (On her left ear). Does not bruise/bleed easily.   Psychiatric/Behavioral: Negative for decreased concentration, self-injury and suicidal ideas. The patient is not nervous/anxious.    All other systems reviewed and are negative.      Objective     /90 (BP Location: Left arm, Patient Position: Sitting, Cuff Size: Adult)  Pulse 74  Temp 98.7 °F (37.1 °C) (Oral)   Ht 60\" (152.4 cm)  Wt 132 lb (59.9 kg)  LMP 07/08/1986 (Within Months)  SpO2 98%  BMI 25.78 kg/m2  Lab on 03/27/2017   Component Date Value Ref Range Status   • Glucose 03/27/2017 104  70 - 110 mg/dL Final   • BUN 03/27/2017 17  7 - 21 mg/dL Final   • Creatinine 03/27/2017 0.87  0.43 - 1.29 mg/dL Final   • Sodium 03/27/2017 142  135 - 153 mmol/L Final   • Potassium 03/27/2017 4.5  3.5 - 5.3 mmol/L Final   • Chloride 03/27/2017 110  99 - 112 mmol/L Final   • CO2 03/27/2017 29.5  24.3 - 31.9 mmol/L Final   • Calcium " 03/27/2017 9.6  7.7 - 10.0 mg/dL Final   • Total Protein 03/27/2017 7.0  6.0 - 8.0 g/dL Final   • Albumin 03/27/2017 4.50  3.40 - 4.80 g/dL Final   • ALT (SGPT) 03/27/2017 23  10 - 36 U/L Final   • AST (SGOT) 03/27/2017 22  10 - 30 U/L Final   • Alkaline Phosphatase 03/27/2017 55  35 - 104 U/L Final   • Total Bilirubin 03/27/2017 0.3  0.2 - 1.8 mg/dL Final   • eGFR Non African Amer 03/27/2017 64  >60 mL/min/1.73 Final   • Globulin 03/27/2017 2.5  gm/dL Final   • A/G Ratio 03/27/2017 1.8  1.5 - 2.5 g/dL Final   • BUN/Creatinine Ratio 03/27/2017 19.5  7.0 - 25.0 Final   • Anion Gap 03/27/2017 2.5* 3.6 - 11.2 mmol/L Final   • Total Cholesterol 03/27/2017 197  0 - 200 mg/dL Final   • Triglycerides 03/27/2017 99  0 - 150 mg/dL Final   • HDL Cholesterol 03/27/2017 46* 60 - 100 mg/dL Final   • LDL Cholesterol  03/27/2017 131* 0 - 100 mg/dL Final   • VLDL Cholesterol 03/27/2017 19.8  mg/dL Final   • LDL/HDL Ratio 03/27/2017 2.85   Final   • Hemoglobin A1C 03/27/2017 6.20* 4.50 - 5.70 % Final   • WBC 03/27/2017 6.05  4.50 - 12.50 10*3/mm3 Final   • RBC 03/27/2017 4.28  4.20 - 5.40 10*6/mm3 Final   • Hemoglobin 03/27/2017 13.1  12.0 - 16.0 g/dL Final   • Hematocrit 03/27/2017 40.4  37.0 - 47.0 % Final   • MCV 03/27/2017 94.4* 80.0 - 94.0 fL Final   • MCH 03/27/2017 30.6  27.0 - 33.0 pg Final   • MCHC 03/27/2017 32.4* 33.0 - 37.0 g/dL Final   • RDW 03/27/2017 13.3  11.5 - 14.5 % Final   • RDW-SD 03/27/2017 44.0  37.0 - 54.0 fl Final   • MPV 03/27/2017 11.1* 6.0 - 10.0 fL Final   • Platelets 03/27/2017 296  130 - 400 10*3/mm3 Final   • Neutrophil % 03/27/2017 47.5  40.0 - 75.0 % Final   • Lymphocyte % 03/27/2017 42.1  16.0 - 46.0 % Final   • Monocyte % 03/27/2017 7.3  0.0 - 12.0 % Final   • Eosinophil % 03/27/2017 2.3  0.0 - 7.0 % Final   • Basophil % 03/27/2017 0.8  0.0 - 2.0 % Final   • Immature Grans % 03/27/2017 0.0  0.0 - 0.5 % Final   • Neutrophils, Absolute 03/27/2017 2.87  1.40 - 6.50 10*3/mm3 Final   • Lymphocytes,  Absolute 03/27/2017 2.55  1.00 - 3.00 10*3/mm3 Final   • Monocytes, Absolute 03/27/2017 0.44  0.10 - 0.90 10*3/mm3 Final   • Eosinophils, Absolute 03/27/2017 0.14  0.00 - 0.70 10*3/mm3 Final   • Basophils, Absolute 03/27/2017 0.05  0.00 - 0.30 10*3/mm3 Final   • Immature Grans, Absolute 03/27/2017 0.00  0.00 - 0.03 10*3/mm3 Final   • Osmolality Calc 03/27/2017 285.0  273.0 - 305.0 mOsm/kg Final       Physical Exam   Constitutional: She is oriented to person, place, and time. She appears well-developed and well-nourished. No distress.   HENT:   Head: Normocephalic.   Right Ear: External ear normal.   Left Ear: External ear normal. There is tenderness. Tympanic membrane is erythematous and bulging. A middle ear effusion is present.   Nose: Nose normal.   Mouth/Throat: Oropharynx is clear and moist.   Eyes: Pupils are equal, round, and reactive to light.   Neck: Normal range of motion. Neck supple. No tracheal deviation present. No thyromegaly present.   Cardiovascular: Normal rate, regular rhythm and normal heart sounds.  Exam reveals no gallop and no friction rub.    No murmur heard.  Pulmonary/Chest: Effort normal and breath sounds normal. No respiratory distress. She has no wheezes. She has no rales. She exhibits no tenderness.   Abdominal: Soft. Bowel sounds are normal. She exhibits no distension and no mass. There is no tenderness. There is no rebound and no guarding.   Musculoskeletal:        Cervical back: She exhibits decreased range of motion and spasm.        Lumbar back: She exhibits tenderness.        Right hand: She exhibits deformity.        Left hand: She exhibits deformity.   Arthritic deformity.  PIPs and DIPs bilateral hands.    Lymphadenopathy:        Head (left side): Posterior auricular adenopathy present.     She has cervical adenopathy.        Left cervical: Deep cervical adenopathy present.   Neurological: She is alert and oriented to person, place, and time. She has normal reflexes.   CN 2-12  grossly intact    Skin: Skin is warm and dry. No rash noted. She is not diaphoretic. No erythema.   Psychiatric: She has a normal mood and affect. Her speech is normal and behavior is normal. Judgment and thought content normal. Cognition and memory are normal.   Vitals reviewed.      Assessment/Plan     Problem List Items Addressed This Visit        Cardiovascular and Mediastinum    Hyperlipidemia       Respiratory    Asthma    Overview     last asthma attack 2-3 month ago, SEASONAL ALLERGIES         Relevant Medications    montelukast (SINGULAIR) 10 MG tablet       Digestive    Constipation       Endocrine    Type 2 diabetes mellitus without complication    Relevant Orders    CBC & Differential    Comprehensive Metabolic Panel    Lipid Panel    TSH    Hemoglobin A1c    MicroAlbumin, Urine, Random       Other    RLS (restless legs syndrome)      Other Visit Diagnoses     High risk medication use    -  Primary    Relevant Orders    Urine Drug Screen    Arthritis        body mechanics   refill ultram  aicha and uds on file    Relevant Medications    traMADol (ULTRAM) 50 MG tablet    ibuprofen (ADVIL,MOTRIN) 800 MG tablet    Restless leg syndrome        Relevant Medications    baclofen (LIORESAL) 10 MG tablet    rOPINIRole (REQUIP) 1 MG tablet    Other seasonal allergic rhinitis        stable    Relevant Medications    cetirizine (zyrTEC) 10 MG tablet    montelukast (SINGULAIR) 10 MG tablet    Vitamin D deficiency        Relevant Orders    Vitamin D 25 Hydroxy    Otitis, left            Start antibiotics, Sudafed and continue routine allergy medications.  Patient is to call office if symptoms are not improved in 2-3 days.  We'll refer to ENT as indicated.  I have discussed diagnosis in detail today allowing time for questions and answers. Pt is aware of reasons to seek urgent or emergent medical care as well as reasons to return to the clinic for evaluation. Possible side effects, interactions and progression of  symptoms discussed as well. Pt / family states understanding.   Diaz has been reviewed as consistent.  Uds is on file.   Medication list has been reviewed and updated.  Encouraged patient to continue to monitor her blood pressure and report any abnormal readings as reviewed today.  Risk of misuse/addiction of controlled medication has been discussed today.Goal: Improved quality of life and reduction in pain as evidenced by pt report.     Follow-up in 2-3 months, sooner if needed.  Fasting labs one week prior to next visit.         This document has been electronically signed by:  ZHOU Moncada, NP-C

## 2017-10-02 ENCOUNTER — LAB (OUTPATIENT)
Dept: FAMILY MEDICINE CLINIC | Facility: CLINIC | Age: 72
End: 2017-10-02

## 2017-10-02 DIAGNOSIS — E11.9 TYPE 2 DIABETES MELLITUS WITHOUT COMPLICATION, WITHOUT LONG-TERM CURRENT USE OF INSULIN (HCC): ICD-10-CM

## 2017-10-02 DIAGNOSIS — E55.9 VITAMIN D DEFICIENCY: ICD-10-CM

## 2017-10-02 LAB
25(OH)D3 SERPL-MCNC: 54 NG/ML
ALBUMIN SERPL-MCNC: 4.5 G/DL (ref 3.4–4.8)
ALBUMIN UR-MCNC: 3.6 MG/L
ALBUMIN/GLOB SERPL: 1.7 G/DL (ref 1.5–2.5)
ALP SERPL-CCNC: 55 U/L (ref 35–104)
ALT SERPL W P-5'-P-CCNC: 26 U/L (ref 10–36)
ANION GAP SERPL CALCULATED.3IONS-SCNC: 5.2 MMOL/L (ref 3.6–11.2)
AST SERPL-CCNC: 23 U/L (ref 10–30)
BASOPHILS # BLD AUTO: 0.05 10*3/MM3 (ref 0–0.3)
BASOPHILS NFR BLD AUTO: 1 % (ref 0–2)
BILIRUB SERPL-MCNC: 0.6 MG/DL (ref 0.2–1.8)
BUN BLD-MCNC: 18 MG/DL (ref 7–21)
BUN/CREAT SERPL: 20.2 (ref 7–25)
CALCIUM SPEC-SCNC: 9.9 MG/DL (ref 7.7–10)
CHLORIDE SERPL-SCNC: 112 MMOL/L (ref 99–112)
CHOLEST SERPL-MCNC: 259 MG/DL (ref 0–200)
CO2 SERPL-SCNC: 27.8 MMOL/L (ref 24.3–31.9)
CREAT BLD-MCNC: 0.89 MG/DL (ref 0.43–1.29)
DEPRECATED RDW RBC AUTO: 42.2 FL (ref 37–54)
EOSINOPHIL # BLD AUTO: 0.11 10*3/MM3 (ref 0–0.7)
EOSINOPHIL NFR BLD AUTO: 2.2 % (ref 0–7)
ERYTHROCYTE [DISTWIDTH] IN BLOOD BY AUTOMATED COUNT: 12.9 % (ref 11.5–14.5)
GFR SERPL CREATININE-BSD FRML MDRD: 63 ML/MIN/1.73
GLOBULIN UR ELPH-MCNC: 2.7 GM/DL
GLUCOSE BLD-MCNC: 106 MG/DL (ref 70–110)
HBA1C MFR BLD: 6.1 % (ref 4.5–5.7)
HCT VFR BLD AUTO: 41 % (ref 37–47)
HDLC SERPL-MCNC: 48 MG/DL (ref 60–100)
HGB BLD-MCNC: 13.6 G/DL (ref 12–16)
IMM GRANULOCYTES # BLD: 0 10*3/MM3 (ref 0–0.03)
IMM GRANULOCYTES NFR BLD: 0 % (ref 0–0.5)
LDLC SERPL CALC-MCNC: 182 MG/DL (ref 0–100)
LDLC/HDLC SERPL: 3.79 {RATIO}
LYMPHOCYTES # BLD AUTO: 2.33 10*3/MM3 (ref 1–3)
LYMPHOCYTES NFR BLD AUTO: 47 % (ref 16–46)
MCH RBC QN AUTO: 30.6 PG (ref 27–33)
MCHC RBC AUTO-ENTMCNC: 33.2 G/DL (ref 33–37)
MCV RBC AUTO: 92.3 FL (ref 80–94)
MONOCYTES # BLD AUTO: 0.38 10*3/MM3 (ref 0.1–0.9)
MONOCYTES NFR BLD AUTO: 7.7 % (ref 0–12)
NEUTROPHILS # BLD AUTO: 2.09 10*3/MM3 (ref 1.4–6.5)
NEUTROPHILS NFR BLD AUTO: 42.1 % (ref 40–75)
OSMOLALITY SERPL CALC.SUM OF ELEC: 291 MOSM/KG (ref 273–305)
PLATELET # BLD AUTO: 262 10*3/MM3 (ref 130–400)
PMV BLD AUTO: 11 FL (ref 6–10)
POTASSIUM BLD-SCNC: 4.1 MMOL/L (ref 3.5–5.3)
PROT SERPL-MCNC: 7.2 G/DL (ref 6–8)
RBC # BLD AUTO: 4.44 10*6/MM3 (ref 4.2–5.4)
SODIUM BLD-SCNC: 145 MMOL/L (ref 135–153)
TRIGL SERPL-MCNC: 146 MG/DL (ref 0–150)
TSH SERPL DL<=0.05 MIU/L-ACNC: 1.73 MIU/ML (ref 0.55–4.78)
VLDLC SERPL-MCNC: 29.2 MG/DL
WBC NRBC COR # BLD: 4.96 10*3/MM3 (ref 4.5–12.5)

## 2017-10-02 PROCEDURE — 84443 ASSAY THYROID STIM HORMONE: CPT | Performed by: NURSE PRACTITIONER

## 2017-10-02 PROCEDURE — 83036 HEMOGLOBIN GLYCOSYLATED A1C: CPT | Performed by: NURSE PRACTITIONER

## 2017-10-02 PROCEDURE — 82043 UR ALBUMIN QUANTITATIVE: CPT | Performed by: NURSE PRACTITIONER

## 2017-10-02 PROCEDURE — 82306 VITAMIN D 25 HYDROXY: CPT | Performed by: NURSE PRACTITIONER

## 2017-10-02 PROCEDURE — 80053 COMPREHEN METABOLIC PANEL: CPT | Performed by: NURSE PRACTITIONER

## 2017-10-02 PROCEDURE — 36415 COLL VENOUS BLD VENIPUNCTURE: CPT

## 2017-10-02 PROCEDURE — 85025 COMPLETE CBC W/AUTO DIFF WBC: CPT | Performed by: NURSE PRACTITIONER

## 2017-10-02 PROCEDURE — 80061 LIPID PANEL: CPT | Performed by: NURSE PRACTITIONER

## 2017-10-03 RX ORDER — ROSUVASTATIN CALCIUM 5 MG/1
5 TABLET, COATED ORAL DAILY
Qty: 30 TABLET | Refills: 3
Start: 2017-10-03 | End: 2017-10-11 | Stop reason: SDUPTHER

## 2017-10-11 ENCOUNTER — OFFICE VISIT (OUTPATIENT)
Dept: FAMILY MEDICINE CLINIC | Facility: CLINIC | Age: 72
End: 2017-10-11

## 2017-10-11 ENCOUNTER — HOSPITAL ENCOUNTER (OUTPATIENT)
Dept: GENERAL RADIOLOGY | Facility: HOSPITAL | Age: 72
Discharge: HOME | End: 2017-10-11
Admitting: NURSE PRACTITIONER

## 2017-10-11 ENCOUNTER — HOSPITAL ENCOUNTER (OUTPATIENT)
Dept: GENERAL RADIOLOGY | Facility: HOSPITAL | Age: 72
Discharge: HOME | End: 2017-10-11

## 2017-10-11 VITALS
DIASTOLIC BLOOD PRESSURE: 60 MMHG | WEIGHT: 131 LBS | TEMPERATURE: 98.1 F | HEIGHT: 60 IN | SYSTOLIC BLOOD PRESSURE: 122 MMHG | HEART RATE: 117 BPM | BODY MASS INDEX: 25.72 KG/M2 | OXYGEN SATURATION: 98 %

## 2017-10-11 DIAGNOSIS — E11.9 TYPE 2 DIABETES MELLITUS WITHOUT COMPLICATION, WITHOUT LONG-TERM CURRENT USE OF INSULIN (HCC): ICD-10-CM

## 2017-10-11 DIAGNOSIS — M19.90 ARTHRITIS: ICD-10-CM

## 2017-10-11 DIAGNOSIS — J30.2 OTHER SEASONAL ALLERGIC RHINITIS: ICD-10-CM

## 2017-10-11 DIAGNOSIS — I10 ESSENTIAL HYPERTENSION: Primary | ICD-10-CM

## 2017-10-11 DIAGNOSIS — G25.81 RESTLESS LEG SYNDROME: ICD-10-CM

## 2017-10-11 DIAGNOSIS — E78.2 MIXED HYPERLIPIDEMIA: ICD-10-CM

## 2017-10-11 DIAGNOSIS — K21.00 GASTROESOPHAGEAL REFLUX DISEASE WITH ESOPHAGITIS: ICD-10-CM

## 2017-10-11 DIAGNOSIS — J45.20 MILD INTERMITTENT ASTHMA WITHOUT COMPLICATION: ICD-10-CM

## 2017-10-11 DIAGNOSIS — K59.01 SLOW TRANSIT CONSTIPATION: ICD-10-CM

## 2017-10-11 DIAGNOSIS — Z23 ENCOUNTER FOR VACCINATION: ICD-10-CM

## 2017-10-11 DIAGNOSIS — G25.81 RLS (RESTLESS LEGS SYNDROME): ICD-10-CM

## 2017-10-11 PROCEDURE — 73030 X-RAY EXAM OF SHOULDER: CPT | Performed by: RADIOLOGY

## 2017-10-11 PROCEDURE — 73130 X-RAY EXAM OF HAND: CPT | Performed by: RADIOLOGY

## 2017-10-11 PROCEDURE — 72100 X-RAY EXAM L-S SPINE 2/3 VWS: CPT | Performed by: RADIOLOGY

## 2017-10-11 PROCEDURE — 72100 X-RAY EXAM L-S SPINE 2/3 VWS: CPT

## 2017-10-11 PROCEDURE — 90471 IMMUNIZATION ADMIN: CPT | Performed by: NURSE PRACTITIONER

## 2017-10-11 PROCEDURE — 90686 IIV4 VACC NO PRSV 0.5 ML IM: CPT | Performed by: NURSE PRACTITIONER

## 2017-10-11 PROCEDURE — 99215 OFFICE O/P EST HI 40 MIN: CPT | Performed by: NURSE PRACTITIONER

## 2017-10-11 PROCEDURE — 96372 THER/PROPH/DIAG INJ SC/IM: CPT | Performed by: NURSE PRACTITIONER

## 2017-10-11 PROCEDURE — 73030 X-RAY EXAM OF SHOULDER: CPT

## 2017-10-11 PROCEDURE — 73120 X-RAY EXAM OF HAND: CPT

## 2017-10-11 RX ORDER — TRAMADOL HYDROCHLORIDE 50 MG/1
50 TABLET ORAL EVERY 8 HOURS PRN
Qty: 60 TABLET | Refills: 2 | Status: SHIPPED | OUTPATIENT
Start: 2017-10-11 | End: 2017-11-21 | Stop reason: DRUGHIGH

## 2017-10-11 RX ORDER — CETIRIZINE HYDROCHLORIDE 10 MG/1
10 TABLET ORAL DAILY
Qty: 90 TABLET | Refills: 3 | Status: SHIPPED | OUTPATIENT
Start: 2017-10-11 | End: 2018-02-21 | Stop reason: SDUPTHER

## 2017-10-11 RX ORDER — RANITIDINE 150 MG/1
150 TABLET ORAL 2 TIMES DAILY
Qty: 180 TABLET | Refills: 5 | Status: SHIPPED | OUTPATIENT
Start: 2017-10-11 | End: 2018-02-21 | Stop reason: SDUPTHER

## 2017-10-11 RX ORDER — ROSUVASTATIN CALCIUM 5 MG/1
5 TABLET, COATED ORAL DAILY
Qty: 90 TABLET | Refills: 3 | Status: SHIPPED | OUTPATIENT
Start: 2017-10-11 | End: 2018-02-21 | Stop reason: SDUPTHER

## 2017-10-11 RX ORDER — VERAPAMIL HYDROCHLORIDE 240 MG/1
240 TABLET, FILM COATED, EXTENDED RELEASE ORAL NIGHTLY
Qty: 90 TABLET | Refills: 3 | Status: SHIPPED | OUTPATIENT
Start: 2017-10-11 | End: 2018-02-21 | Stop reason: SDUPTHER

## 2017-10-11 RX ORDER — BACLOFEN 10 MG/1
10 TABLET ORAL DAILY PRN
Qty: 90 TABLET | Refills: 3 | Status: SHIPPED | OUTPATIENT
Start: 2017-10-11 | End: 2018-02-21 | Stop reason: SDUPTHER

## 2017-10-11 RX ORDER — MONTELUKAST SODIUM 10 MG/1
10 TABLET ORAL NIGHTLY
Qty: 90 TABLET | Refills: 3 | Status: SHIPPED | OUTPATIENT
Start: 2017-10-11 | End: 2018-02-21 | Stop reason: SDUPTHER

## 2017-10-11 RX ORDER — DEXAMETHASONE SODIUM PHOSPHATE 4 MG/ML
8 INJECTION, SOLUTION INTRA-ARTICULAR; INTRALESIONAL; INTRAMUSCULAR; INTRAVENOUS; SOFT TISSUE ONCE
Status: COMPLETED | OUTPATIENT
Start: 2017-10-11 | End: 2017-10-11

## 2017-10-11 RX ORDER — IBUPROFEN 800 MG/1
800 TABLET ORAL EVERY 8 HOURS PRN
Qty: 90 TABLET | Refills: 3 | Status: SHIPPED | OUTPATIENT
Start: 2017-10-11 | End: 2018-02-21 | Stop reason: SDUPTHER

## 2017-10-11 RX ORDER — ROPINIROLE 2 MG/1
2 TABLET, FILM COATED ORAL NIGHTLY
Qty: 90 TABLET | Refills: 3 | Status: SHIPPED | OUTPATIENT
Start: 2017-10-11 | End: 2018-02-21 | Stop reason: SDUPTHER

## 2017-10-11 RX ADMIN — DEXAMETHASONE SODIUM PHOSPHATE 8 MG: 4 INJECTION, SOLUTION INTRA-ARTICULAR; INTRALESIONAL; INTRAMUSCULAR; INTRAVENOUS; SOFT TISSUE at 11:08

## 2017-10-11 NOTE — PROGRESS NOTES
Subjective   Tyler Cabello is a 72 y.o. female.     Chief Complaint   Patient presents with   • Pain   • Hypertension   • Heartburn   • Hyperlipidemia   • Med Refill   • Diabetes       History of Present Illness     Asthma-patient reports that she has had no recent exacerbation.    Chronic allergic rhinitis and environmental allergies-patient tries to avoid known allergy triggers.  She currently receives Singulair and Zyrtec.  She denies side effects.    Hyperlipidemia-patient has started Crestor 5 mg daily.  She denies any side effects.  She eats a low fat heart healthy diet.  She has been unable to exercise due to recent exacerbation in osteoarthritis.    GERD-patient has had an EGD and colonoscopy.  Patient is currently maintained on Zantac 150 mg twice daily which is helpful to control her symptoms.    Osteoarthritis of multiple sites-patient reports that her pain is rated 9 on pain scale rating of 0-10 today.  She reports that over the past 2 months she has had an increase in her joint pain in her neck, shoulders, low back and hips.  The majority of her pain is concentrated in her right shoulder and right hip.  Her low pain radiates down into her right buttock. She has had neck surgery in the past at [facility] in [facility]. Reports that the pain radiates from her low back into her right lower extremity. Has pain in both her hands and decreased strength in her . Describes that her pain is aching, throbbing and catching.         The following portions of the patient's history were reviewed and updated as appropriate: allergies, current medications, past family history, past medical history, past social history, past surgical history and problem list.    Review of Systems   Constitutional: Positive for activity change and fatigue. Negative for appetite change, chills, fever and unexpected weight change.   HENT: Negative.    Eyes: Positive for visual disturbance (glasses).   Respiratory:  "Negative for cough, chest tightness, shortness of breath and wheezing.    Cardiovascular: Negative for chest pain, palpitations and leg swelling.   Gastrointestinal: Positive for constipation. Negative for abdominal pain, blood in stool, diarrhea, nausea and vomiting.   Endocrine: Negative.    Genitourinary: Negative for dysuria, flank pain and frequency.   Musculoskeletal: Positive for arthralgias, back pain, joint swelling, myalgias and neck pain. Negative for neck stiffness.   Skin: Negative for rash.   Allergic/Immunologic: Positive for environmental allergies.   Neurological: Positive for numbness (hands and lower extremities at times , pain radiates into her buttock and lower extremities with a feeling of numbness). Negative for dizziness, tremors, facial asymmetry and speech difficulty.   Hematological: Negative.    Psychiatric/Behavioral: Negative for agitation, confusion, decreased concentration, dysphoric mood, self-injury and suicidal ideas. The patient is not nervous/anxious.        Objective     /60 (BP Location: Left arm, Patient Position: Sitting, Cuff Size: Adult)  Pulse 117  Temp 98.1 °F (36.7 °C) (Tympanic)   Ht 60\" (152.4 cm)  Wt 131 lb (59.4 kg)  LMP 07/08/1986 (Within Months)  SpO2 98%  BMI 25.58 kg/m2  Lab on 10/02/2017   Component Date Value Ref Range Status   • Glucose 10/02/2017 106  70 - 110 mg/dL Final   • BUN 10/02/2017 18  7 - 21 mg/dL Final   • Creatinine 10/02/2017 0.89  0.43 - 1.29 mg/dL Final   • Sodium 10/02/2017 145  135 - 153 mmol/L Final   • Potassium 10/02/2017 4.1  3.5 - 5.3 mmol/L Final   • Chloride 10/02/2017 112  99 - 112 mmol/L Final   • CO2 10/02/2017 27.8  24.3 - 31.9 mmol/L Final   • Calcium 10/02/2017 9.9  7.7 - 10.0 mg/dL Final   • Total Protein 10/02/2017 7.2  6.0 - 8.0 g/dL Final   • Albumin 10/02/2017 4.50  3.40 - 4.80 g/dL Final   • ALT (SGPT) 10/02/2017 26  10 - 36 U/L Final   • AST (SGOT) 10/02/2017 23  10 - 30 U/L Final   • Alkaline Phosphatase " 10/02/2017 55  35 - 104 U/L Final   • Total Bilirubin 10/02/2017 0.6  0.2 - 1.8 mg/dL Final   • eGFR Non African Amer 10/02/2017 63  >60 mL/min/1.73 Final   • Globulin 10/02/2017 2.7  gm/dL Final   • A/G Ratio 10/02/2017 1.7  1.5 - 2.5 g/dL Final   • BUN/Creatinine Ratio 10/02/2017 20.2  7.0 - 25.0 Final   • Anion Gap 10/02/2017 5.2  3.6 - 11.2 mmol/L Final   • Total Cholesterol 10/02/2017 259* 0 - 200 mg/dL Final   • Triglycerides 10/02/2017 146  0 - 150 mg/dL Final   • HDL Cholesterol 10/02/2017 48* 60 - 100 mg/dL Final   • LDL Cholesterol  10/02/2017 182* 0 - 100 mg/dL Final   • VLDL Cholesterol 10/02/2017 29.2  mg/dL Final   • LDL/HDL Ratio 10/02/2017 3.79   Final   • TSH 10/02/2017 1.733  0.550 - 4.780 mIU/mL Final   • Hemoglobin A1C 10/02/2017 6.10* 4.50 - 5.70 % Final   • 25 Hydroxy, Vitamin D 10/02/2017 54.0  ng/ml Final   • Microalbumin, Urine 10/02/2017 3.6  mg/L Final   • WBC 10/02/2017 4.96  4.50 - 12.50 10*3/mm3 Final   • RBC 10/02/2017 4.44  4.20 - 5.40 10*6/mm3 Final   • Hemoglobin 10/02/2017 13.6  12.0 - 16.0 g/dL Final   • Hematocrit 10/02/2017 41.0  37.0 - 47.0 % Final   • MCV 10/02/2017 92.3  80.0 - 94.0 fL Final   • MCH 10/02/2017 30.6  27.0 - 33.0 pg Final   • MCHC 10/02/2017 33.2  33.0 - 37.0 g/dL Final   • RDW 10/02/2017 12.9  11.5 - 14.5 % Final   • RDW-SD 10/02/2017 42.2  37.0 - 54.0 fl Final   • MPV 10/02/2017 11.0* 6.0 - 10.0 fL Final   • Platelets 10/02/2017 262  130 - 400 10*3/mm3 Final   • Neutrophil % 10/02/2017 42.1  40.0 - 75.0 % Final   • Lymphocyte % 10/02/2017 47.0* 16.0 - 46.0 % Final   • Monocyte % 10/02/2017 7.7  0.0 - 12.0 % Final   • Eosinophil % 10/02/2017 2.2  0.0 - 7.0 % Final   • Basophil % 10/02/2017 1.0  0.0 - 2.0 % Final   • Immature Grans % 10/02/2017 0.0  0.0 - 0.5 % Final   • Neutrophils, Absolute 10/02/2017 2.09  1.40 - 6.50 10*3/mm3 Final   • Lymphocytes, Absolute 10/02/2017 2.33  1.00 - 3.00 10*3/mm3 Final   • Monocytes, Absolute 10/02/2017 0.38  0.10 - 0.90  10*3/mm3 Final   • Eosinophils, Absolute 10/02/2017 0.11  0.00 - 0.70 10*3/mm3 Final   • Basophils, Absolute 10/02/2017 0.05  0.00 - 0.30 10*3/mm3 Final   • Immature Grans, Absolute 10/02/2017 0.00  0.00 - 0.03 10*3/mm3 Final   • Osmolality Calc 10/02/2017 291.0  273.0 - 305.0 mOsm/kg Final       Physical Exam   Constitutional: She is oriented to person, place, and time. She appears well-developed and well-nourished. No distress.   HENT:   Head: Normocephalic.   Right Ear: External ear normal.   Left Ear: External ear normal.   Nose: Nose normal.   Mouth/Throat: Oropharynx is clear and moist. No oropharyngeal exudate.   Eyes: Pupils are equal, round, and reactive to light.   Neck: Normal range of motion. Neck supple. No tracheal deviation present. No thyromegaly present.   Cardiovascular: Normal rate, regular rhythm and normal heart sounds.  Exam reveals no gallop and no friction rub.    No murmur heard.  Pulmonary/Chest: Effort normal and breath sounds normal. No respiratory distress. She has no wheezes. She has no rales. She exhibits no tenderness.   Abdominal: Soft. Bowel sounds are normal. She exhibits no distension and no mass. There is no tenderness. There is no rebound and no guarding.   Musculoskeletal:        Cervical back: She exhibits tenderness and spasm.        Lumbar back: She exhibits decreased range of motion, tenderness and spasm.        Right hand: She exhibits deformity.        Left hand: She exhibits deformity.   Varicose veins bilateral lower extremities.  Negative Homans bilateral lower extremities.  Ulnar deviation.   Lymphadenopathy:     She has no cervical adenopathy.   Neurological: She is alert and oriented to person, place, and time. She has normal reflexes.   CN 2-12 grossly intact    Skin: Skin is warm and dry. No rash noted. She is not diaphoretic. No erythema.   Psychiatric: She has a normal mood and affect. Her speech is normal and behavior is normal. Judgment and thought content  normal. Cognition and memory are normal.       Assessment/Plan     Problem List Items Addressed This Visit        Cardiovascular and Mediastinum    Hyperlipidemia    Relevant Medications    rosuvastatin (CRESTOR) 5 MG tablet    Hypertension - Primary    Relevant Medications    verapamil SR (CALAN-SR) 240 MG CR tablet       Respiratory    Asthma    Current Assessment & Plan     Asthma is improving with treatment.  The patient is experiencing monthly daytime asthma symptoms. She is experiencing no nighttime asthma symptoms.  Patient to keep asthma diary.  Discussed monitoring symptoms and use of quick-relief medications and contacting us early in the course of exacerbations.  Warning signs of respiratory distress were reviewed with the patient.              Relevant Medications    montelukast (SINGULAIR) 10 MG tablet       Digestive    Gastroesophageal reflux disease with esophagitis    Current Assessment & Plan     Stable with current medication.  Avoid large or spicy meals.  Avoid reclining for at least 2 hours after meals.         Relevant Medications    raNITIdine (ZANTAC) 150 MG tablet    Constipation    Current Assessment & Plan     Maintain adequate fluid intake.  Do not strain.  Daily exercise as tolerated.  Continue current medication.            Endocrine    Type 2 diabetes mellitus without complication    Current Assessment & Plan     Diabetes is improving with lifestyle modifications.   Continue current treatment regimen.  Diabetes will be reassessed in 3 months.            Other    RLS (restless legs syndrome)    Current Assessment & Plan     Stop Requip 1 mg prior to bed.  Start Requip 2 mg 30 minutes prior to bed nightly.           Other Visit Diagnoses     Arthritis        body mechanics   refill ultram  aicha and uds on file    Relevant Medications    traMADol (ULTRAM) 50 MG tablet    ibuprofen (ADVIL,MOTRIN) 800 MG tablet    dexamethasone (DECADRON) injection 8 mg (Completed)    Other Relevant  Orders    Urine Drug Screen - Urine, Clean Catch    XR Spine Lumbar 2 or 3 View (Completed)    XR Shoulder 2+ View Right (Completed)    Ambulatory Referral to Physical Therapy Evaluate and treat (Completed)    XR hand 2 vw bilateral (Completed)    Restless leg syndrome        Relevant Medications    rOPINIRole (REQUIP) 2 MG tablet    baclofen (LIORESAL) 10 MG tablet    Other seasonal allergic rhinitis        stable    Relevant Medications    montelukast (SINGULAIR) 10 MG tablet    cetirizine (zyrTEC) 10 MG tablet    Encounter for vaccination        Relevant Orders    Flu Vaccine Quad PF 3YR+ (Completed)      Most recent labs have been reviewed and discussed.  Time face-to-face with patient approximately 50 minutes.  I have discussed diagnosis in detail today allowing time for questions and answers. Pt is aware of reasons to seek urgent or emergent medical care as well as reasons to return to the clinic for evaluation. Possible side effects, interactions and progression of symptoms discussed as well. Pt / family states understanding.   Emotional support and active listening provided.   Recommend support stockings and ANGELICA hose and supportive shoes.  Increase Requip from 1 mg at bedtime up to 2 mg at bedtime.  I have discussed side effects and need to avoid suddenly stopping this medication.  After obtaining informed consent, the immunization is given by staff.  Diaz has been reviewed as consistent.  Uds is on file.   Goal: Improved quality of life and reduction in pain as evidenced by pt report.   Patient has been counseled regarding the possible misuse and addiction with controlled medication use.  Patient will have x-ray orders completed with in the next week.  Recommend she avoid overuse of the joints and avoid painful movements.  Refill routine medication.  Medication list has been reviewed with the patient and reason/purpose of medication and possible side effects/interactions have been discussed.  RTC 2-5 days  if not improved, sooner if condition worsens/changes. Symptomatic care advised as well as reasons for urgent or emergent care. Pt / family state understanding.   Routine follow-up in 4-6 weeks, sooner if needed.      Current Outpatient Prescriptions:   •  baclofen (LIORESAL) 10 MG tablet, Take 1 tablet by mouth Daily As Needed for Muscle Spasms., Disp: 90 tablet, Rfl: 3  •  cetirizine (zyrTEC) 10 MG tablet, Take 1 tablet by mouth Daily., Disp: 90 tablet, Rfl: 3  •  ibuprofen (ADVIL,MOTRIN) 800 MG tablet, Take 1 tablet by mouth Every 8 (Eight) Hours As Needed for Mild Pain  or Fever., Disp: 90 tablet, Rfl: 3  •  montelukast (SINGULAIR) 10 MG tablet, Take 1 tablet by mouth Every Night., Disp: 90 tablet, Rfl: 3  •  raNITIdine (ZANTAC) 150 MG tablet, Take 1 tablet by mouth 2 (Two) Times a Day., Disp: 180 tablet, Rfl: 5  •  rOPINIRole (REQUIP) 2 MG tablet, Take 1 tablet by mouth Every Night. Take 1 hour before bedtime., Disp: 90 tablet, Rfl: 3  •  rosuvastatin (CRESTOR) 5 MG tablet, Take 1 tablet by mouth Daily., Disp: 90 tablet, Rfl: 3  •  traMADol (ULTRAM) 50 MG tablet, Take 1 tablet by mouth Every 8 (Eight) Hours As Needed for Moderate Pain ., Disp: 60 tablet, Rfl: 2  •  verapamil SR (CALAN-SR) 240 MG CR tablet, Take 1 tablet by mouth Every Night., Disp: 90 tablet, Rfl: 3         This document has been electronically signed by:  ZHOU Moncada, NP-C

## 2017-10-12 NOTE — ASSESSMENT & PLAN NOTE
Maintain adequate fluid intake.  Do not strain.  Daily exercise as tolerated.  Continue current medication.

## 2017-10-12 NOTE — ASSESSMENT & PLAN NOTE
Asthma is improving with treatment.  The patient is experiencing monthly daytime asthma symptoms. She is experiencing no nighttime asthma symptoms.  Patient to keep asthma diary.  Discussed monitoring symptoms and use of quick-relief medications and contacting us early in the course of exacerbations.  Warning signs of respiratory distress were reviewed with the patient.

## 2017-10-12 NOTE — ASSESSMENT & PLAN NOTE
Diabetes is improving with lifestyle modifications.   Continue current treatment regimen.  Diabetes will be reassessed in 3 months.

## 2017-10-18 ENCOUNTER — TELEPHONE (OUTPATIENT)
Dept: FAMILY MEDICINE CLINIC | Facility: CLINIC | Age: 72
End: 2017-10-18

## 2017-10-20 NOTE — TELEPHONE ENCOUNTER
Have office staff called madelyn Callaway on the 31st for joint injection as I have an opening on that day.

## 2017-11-21 ENCOUNTER — OFFICE VISIT (OUTPATIENT)
Dept: FAMILY MEDICINE CLINIC | Facility: CLINIC | Age: 72
End: 2017-11-21

## 2017-11-21 VITALS
SYSTOLIC BLOOD PRESSURE: 132 MMHG | DIASTOLIC BLOOD PRESSURE: 78 MMHG | OXYGEN SATURATION: 97 % | HEART RATE: 87 BPM | BODY MASS INDEX: 25.95 KG/M2 | WEIGHT: 132.2 LBS | TEMPERATURE: 98.8 F | HEIGHT: 60 IN

## 2017-11-21 DIAGNOSIS — G44.309 HEADACHES DUE TO OLD HEAD TRAUMA: ICD-10-CM

## 2017-11-21 DIAGNOSIS — E11.9 TYPE 2 DIABETES MELLITUS WITHOUT COMPLICATION, WITHOUT LONG-TERM CURRENT USE OF INSULIN (HCC): ICD-10-CM

## 2017-11-21 DIAGNOSIS — M25.561 PAIN IN BOTH KNEES, UNSPECIFIED CHRONICITY: Primary | ICD-10-CM

## 2017-11-21 DIAGNOSIS — E78.2 MIXED HYPERLIPIDEMIA: ICD-10-CM

## 2017-11-21 DIAGNOSIS — S09.90XS HEADACHES DUE TO OLD HEAD TRAUMA: ICD-10-CM

## 2017-11-21 DIAGNOSIS — K82.8 BILIARY DYSKINESIA: ICD-10-CM

## 2017-11-21 DIAGNOSIS — E55.9 VITAMIN D DEFICIENCY DISEASE: ICD-10-CM

## 2017-11-21 DIAGNOSIS — M54.50 LOW BACK PAIN POTENTIALLY ASSOCIATED WITH RADICULOPATHY: ICD-10-CM

## 2017-11-21 DIAGNOSIS — I10 ESSENTIAL HYPERTENSION: ICD-10-CM

## 2017-11-21 DIAGNOSIS — J45.20 MILD INTERMITTENT ASTHMA WITHOUT COMPLICATION: ICD-10-CM

## 2017-11-21 DIAGNOSIS — M25.562 PAIN IN BOTH KNEES, UNSPECIFIED CHRONICITY: Primary | ICD-10-CM

## 2017-11-21 DIAGNOSIS — K59.01 SLOW TRANSIT CONSTIPATION: ICD-10-CM

## 2017-11-21 DIAGNOSIS — G25.81 RLS (RESTLESS LEGS SYNDROME): ICD-10-CM

## 2017-11-21 DIAGNOSIS — K21.00 GASTROESOPHAGEAL REFLUX DISEASE WITH ESOPHAGITIS: ICD-10-CM

## 2017-11-21 PROCEDURE — 99214 OFFICE O/P EST MOD 30 MIN: CPT | Performed by: NURSE PRACTITIONER

## 2017-11-21 RX ORDER — HYDROCODONE BITARTRATE AND ACETAMINOPHEN 7.5; 325 MG/1; MG/1
1 TABLET ORAL EVERY 6 HOURS PRN
Qty: 60 TABLET | Refills: 0 | Status: SHIPPED | OUTPATIENT
Start: 2017-11-21 | End: 2018-02-21 | Stop reason: SDUPTHER

## 2017-11-21 RX ORDER — TRAMADOL HYDROCHLORIDE 100 MG/1
100 TABLET, EXTENDED RELEASE ORAL DAILY
Qty: 30 TABLET | Refills: 2 | Status: SHIPPED | OUTPATIENT
Start: 2017-11-21 | End: 2018-02-21 | Stop reason: SDUPTHER

## 2017-11-21 NOTE — PROGRESS NOTES
Subjective   Tyler Cabello is a 72 y.o. female.     Chief Complaint   Patient presents with   • Pain   • Hyperlipidemia       History of Present Illness       Patient is here to follow-up on joint pain  She has a history of 2 neck surgeries do to cervical disc bulge.  Patient rates her neck, low back, right shoulder, hips and both knees pain as 8 on pain scale rating of 0-10.  Patient is currently undergoing physical therapy.  She does have recent x-rays to review.  She reports that her pain is an aching, throbbing and sharp at times.  Joint use does exacerbate the pain.  She reports the onset of cold weather has made her low back and hip pain worse.  Patient has been seen by the neurology group in [place].  She is trying to avoid additional back surgeries.  Patient currently receives Ultram 50 mg which she reports does not last her through the day and she has break through pain.  Her pain does interfere with her ability to perform household chores and care for her grandchild.  Patient doesn't times have difficulty sleeping due to pain.  She has no history of substance abuse or addiction.    Hypertension - stable    Hyperlipidemia-patient states she is tolerating Crestor well.  She is making dietary adjustments.    Asthma-no recent asthma exacerbation.    Results-patient has recent x-rays to review.      The following portions of the patient's history were reviewed and updated as appropriate: allergies, current medications, past family history, past medical history, past social history, past surgical history and problem list.    Review of Systems   Constitutional: Negative for activity change, appetite change, chills, fatigue and fever.   HENT: Negative for ear pain, facial swelling, hearing loss, sinus pressure, sore throat, trouble swallowing and voice change.    Eyes: Negative for pain, discharge and visual disturbance.   Respiratory: Negative for apnea, cough, chest tightness, shortness of breath and wheezing.   "  Cardiovascular: Negative for chest pain, palpitations and leg swelling.   Gastrointestinal: Negative for abdominal pain, blood in stool, constipation, diarrhea, nausea and vomiting.   Genitourinary: Negative for dysuria.   Musculoskeletal: Positive for arthralgias, back pain, myalgias and neck pain. Negative for neck stiffness.   Skin: Negative for color change.   Allergic/Immunologic: Positive for environmental allergies.   Neurological: Positive for headaches (improved and less often than weekly ).   Hematological: Negative.    Psychiatric/Behavioral: Negative for confusion, decreased concentration and suicidal ideas. The patient is not nervous/anxious.    All other systems reviewed and are negative.      Objective     /78 (BP Location: Left arm, Patient Position: Sitting)  Pulse 87  Temp 98.8 °F (37.1 °C) (Oral)   Ht 60\" (152.4 cm)  Wt 132 lb 3.2 oz (60 kg)  LMP 07/08/1986 (Within Months)  SpO2 97%  BMI 25.82 kg/m2  Lab on 10/02/2017   Component Date Value Ref Range Status   • Glucose 10/02/2017 106  70 - 110 mg/dL Final   • BUN 10/02/2017 18  7 - 21 mg/dL Final   • Creatinine 10/02/2017 0.89  0.43 - 1.29 mg/dL Final   • Sodium 10/02/2017 145  135 - 153 mmol/L Final   • Potassium 10/02/2017 4.1  3.5 - 5.3 mmol/L Final   • Chloride 10/02/2017 112  99 - 112 mmol/L Final   • CO2 10/02/2017 27.8  24.3 - 31.9 mmol/L Final   • Calcium 10/02/2017 9.9  7.7 - 10.0 mg/dL Final   • Total Protein 10/02/2017 7.2  6.0 - 8.0 g/dL Final   • Albumin 10/02/2017 4.50  3.40 - 4.80 g/dL Final   • ALT (SGPT) 10/02/2017 26  10 - 36 U/L Final   • AST (SGOT) 10/02/2017 23  10 - 30 U/L Final   • Alkaline Phosphatase 10/02/2017 55  35 - 104 U/L Final   • Total Bilirubin 10/02/2017 0.6  0.2 - 1.8 mg/dL Final   • eGFR Non African Amer 10/02/2017 63  >60 mL/min/1.73 Final   • Globulin 10/02/2017 2.7  gm/dL Final   • A/G Ratio 10/02/2017 1.7  1.5 - 2.5 g/dL Final   • BUN/Creatinine Ratio 10/02/2017 20.2  7.0 - 25.0 Final   • " Anion Gap 10/02/2017 5.2  3.6 - 11.2 mmol/L Final   • Total Cholesterol 10/02/2017 259* 0 - 200 mg/dL Final   • Triglycerides 10/02/2017 146  0 - 150 mg/dL Final   • HDL Cholesterol 10/02/2017 48* 60 - 100 mg/dL Final   • LDL Cholesterol  10/02/2017 182* 0 - 100 mg/dL Final   • VLDL Cholesterol 10/02/2017 29.2  mg/dL Final   • LDL/HDL Ratio 10/02/2017 3.79   Final   • TSH 10/02/2017 1.733  0.550 - 4.780 mIU/mL Final   • Hemoglobin A1C 10/02/2017 6.10* 4.50 - 5.70 % Final   • 25 Hydroxy, Vitamin D 10/02/2017 54.0  ng/ml Final   • Microalbumin, Urine 10/02/2017 3.6  mg/L Final   • WBC 10/02/2017 4.96  4.50 - 12.50 10*3/mm3 Final   • RBC 10/02/2017 4.44  4.20 - 5.40 10*6/mm3 Final   • Hemoglobin 10/02/2017 13.6  12.0 - 16.0 g/dL Final   • Hematocrit 10/02/2017 41.0  37.0 - 47.0 % Final   • MCV 10/02/2017 92.3  80.0 - 94.0 fL Final   • MCH 10/02/2017 30.6  27.0 - 33.0 pg Final   • MCHC 10/02/2017 33.2  33.0 - 37.0 g/dL Final   • RDW 10/02/2017 12.9  11.5 - 14.5 % Final   • RDW-SD 10/02/2017 42.2  37.0 - 54.0 fl Final   • MPV 10/02/2017 11.0* 6.0 - 10.0 fL Final   • Platelets 10/02/2017 262  130 - 400 10*3/mm3 Final   • Neutrophil % 10/02/2017 42.1  40.0 - 75.0 % Final   • Lymphocyte % 10/02/2017 47.0* 16.0 - 46.0 % Final   • Monocyte % 10/02/2017 7.7  0.0 - 12.0 % Final   • Eosinophil % 10/02/2017 2.2  0.0 - 7.0 % Final   • Basophil % 10/02/2017 1.0  0.0 - 2.0 % Final   • Immature Grans % 10/02/2017 0.0  0.0 - 0.5 % Final   • Neutrophils, Absolute 10/02/2017 2.09  1.40 - 6.50 10*3/mm3 Final   • Lymphocytes, Absolute 10/02/2017 2.33  1.00 - 3.00 10*3/mm3 Final   • Monocytes, Absolute 10/02/2017 0.38  0.10 - 0.90 10*3/mm3 Final   • Eosinophils, Absolute 10/02/2017 0.11  0.00 - 0.70 10*3/mm3 Final   • Basophils, Absolute 10/02/2017 0.05  0.00 - 0.30 10*3/mm3 Final   • Immature Grans, Absolute 10/02/2017 0.00  0.00 - 0.03 10*3/mm3 Final   • Osmolality Calc 10/02/2017 291.0  273.0 - 305.0 mOsm/kg Final       Physical Exam    Constitutional: She is oriented to person, place, and time. Vital signs are normal. She appears well-developed and well-nourished. No distress.   Very pleasant adult female   HENT:   Head: Normocephalic.   Right Ear: External ear normal.   Left Ear: External ear normal.   Nose: Nose normal.   Mouth/Throat: Oropharynx is clear and moist. No oropharyngeal exudate.   Eyes: Pupils are equal, round, and reactive to light.   Neck: Normal range of motion. Neck supple. No tracheal deviation present. No thyromegaly present.   Cardiovascular: Normal rate, regular rhythm and normal heart sounds.  Exam reveals no gallop and no friction rub.    No murmur heard.  Pulmonary/Chest: Effort normal and breath sounds normal. No respiratory distress. She has no wheezes. She has no rales. She exhibits no tenderness.   Abdominal: Soft. Bowel sounds are normal. She exhibits no distension and no mass. There is no tenderness. There is no rebound and no guarding.   Musculoskeletal:        Right shoulder: She exhibits decreased range of motion, crepitus and spasm.        Right knee: Tenderness found. Patellar tendon tenderness noted.        Left knee: Tenderness found. Patellar tendon tenderness noted.        Lumbar back: She exhibits tenderness.   Lymphadenopathy:     She has no cervical adenopathy.   Neurological: She is alert and oriented to person, place, and time. She has normal reflexes.   CN 2-12 grossly intact    Skin: Skin is warm and dry. No rash noted. She is not diaphoretic. No erythema.   Psychiatric: She has a normal mood and affect. Her speech is normal and behavior is normal. Judgment and thought content normal.   Vitals reviewed.      Assessment/Plan     Problem List Items Addressed This Visit        Cardiovascular and Mediastinum    Hyperlipidemia    Relevant Orders    Lipid Panel    Hypertension    Relevant Orders    CBC & Differential    Comprehensive Metabolic Panel    TSH    Hemoglobin A1c    Vitamin D 25 Hydroxy     MicroAlbumin, Urine, Random - Urine, Clean Catch    Lipid Panel    Vitamin B12       Respiratory    Asthma       Digestive    Gastroesophageal reflux disease with esophagitis    Biliary dyskinesia    Constipation       Endocrine    Type 2 diabetes mellitus without complication    Relevant Orders    Comprehensive Metabolic Panel    Hemoglobin A1c    Lipid Panel    Vitamin B12       Nervous and Auditory    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  I have added an order today for physical therapy to include her bilateral knee pain in their treatment.  Patient has been instructed on body mechanics.  I'll add Norco 7.5 one by mouth every 6 hours #60 with no refills to her medication regimen.  Her pain is chronic and has been present for greater than 5 years.  Labs have been reviewed and discussed.  X-ray of the low back and right shoulder has been reviewed and discussed.  Physical therapy notes have been reviewed and discussed.  I have discussed diagnosis in detail today allowing time for questions and answers. Pt is aware of reasons to seek urgent or emergent medical care as well as reasons to return to the clinic for evaluation. Possible side effects, interactions and progression of symptoms discussed as well. Pt / family states understanding.   Diaz has been reviewed as consistent.  Uds is on file.   Goal: Improved quality of life and reduction in pain as evidenced by pt report.   Patient has been instructed and counseled regarding opioid misuse and risk of addiction.  We have discussed proper storage  and disposal of controlled medication.  As part of this patient's treatment plan they are being prescribed controlled substance/substances with potential for abuse. This patient has been made aware of the appropriate use of such medications, including potential risk of somnolence, limited ability to drive and / or work safely, and potential for overdose. It has also been made clear that these medications are for use by this patient only, without concomitant use of alcohol or other substances unless prescribed/advised by medical provider. Patient has completed controlled substance agreement detailing terms of continued prescribing of controlled substances including monitoring Diaz reports, drug screens and pill counts. The patient was asked and states they are not receiving narcotic medication from any there provider or any provider that this office has not been made aware of. History and physical exam exhibit continued safe and appropriate use of controlled substances.   Patient states she wants to try to avoid going back to her neurologist because she knows he wants to do additional surgery.  I will request MRI of the lumbar spine at this time.  Follow-up in 2-3 months, labs the week before.               This document has been electronically signed by:  ZHOU Moncada, NP-C

## 2018-02-13 ENCOUNTER — LAB (OUTPATIENT)
Dept: FAMILY MEDICINE CLINIC | Facility: CLINIC | Age: 73
End: 2018-02-13

## 2018-02-13 DIAGNOSIS — E11.9 TYPE 2 DIABETES MELLITUS WITHOUT COMPLICATION, WITHOUT LONG-TERM CURRENT USE OF INSULIN (HCC): ICD-10-CM

## 2018-02-13 DIAGNOSIS — E55.9 VITAMIN D DEFICIENCY DISEASE: ICD-10-CM

## 2018-02-13 DIAGNOSIS — E78.2 MIXED HYPERLIPIDEMIA: ICD-10-CM

## 2018-02-13 DIAGNOSIS — I10 ESSENTIAL HYPERTENSION: ICD-10-CM

## 2018-02-13 LAB
25(OH)D3 SERPL-MCNC: 41 NG/ML
ALBUMIN SERPL-MCNC: 4.6 G/DL (ref 3.4–4.8)
ALBUMIN UR-MCNC: 3.3 MG/L
ALBUMIN/GLOB SERPL: 1.9 G/DL (ref 1.5–2.5)
ALP SERPL-CCNC: 55 U/L (ref 35–104)
ALT SERPL W P-5'-P-CCNC: 45 U/L (ref 10–36)
ANION GAP SERPL CALCULATED.3IONS-SCNC: 7 MMOL/L (ref 3.6–11.2)
AST SERPL-CCNC: 28 U/L (ref 10–30)
BASOPHILS # BLD AUTO: 0.03 10*3/MM3 (ref 0–0.3)
BASOPHILS NFR BLD AUTO: 0.5 % (ref 0–2)
BILIRUB SERPL-MCNC: 0.4 MG/DL (ref 0.2–1.8)
BUN BLD-MCNC: 14 MG/DL (ref 7–21)
BUN/CREAT SERPL: 15.2 (ref 7–25)
CALCIUM SPEC-SCNC: 9.4 MG/DL (ref 7.7–10)
CHLORIDE SERPL-SCNC: 107 MMOL/L (ref 99–112)
CHOLEST SERPL-MCNC: 159 MG/DL (ref 0–200)
CO2 SERPL-SCNC: 29 MMOL/L (ref 24.3–31.9)
CREAT BLD-MCNC: 0.92 MG/DL (ref 0.43–1.29)
DEPRECATED RDW RBC AUTO: 41.8 FL (ref 37–54)
EOSINOPHIL # BLD AUTO: 0.24 10*3/MM3 (ref 0–0.7)
EOSINOPHIL NFR BLD AUTO: 4.3 % (ref 0–7)
ERYTHROCYTE [DISTWIDTH] IN BLOOD BY AUTOMATED COUNT: 12.7 % (ref 11.5–14.5)
GFR SERPL CREATININE-BSD FRML MDRD: 60 ML/MIN/1.73
GLOBULIN UR ELPH-MCNC: 2.4 GM/DL
GLUCOSE BLD-MCNC: 93 MG/DL (ref 70–110)
HBA1C MFR BLD: 6.1 % (ref 4.5–5.7)
HCT VFR BLD AUTO: 40.2 % (ref 37–47)
HDLC SERPL-MCNC: 49 MG/DL (ref 60–100)
HGB BLD-MCNC: 13.5 G/DL (ref 12–16)
IMM GRANULOCYTES # BLD: 0 10*3/MM3 (ref 0–0.03)
IMM GRANULOCYTES NFR BLD: 0 % (ref 0–0.5)
LDLC SERPL CALC-MCNC: 88 MG/DL (ref 0–100)
LDLC/HDLC SERPL: 1.8 {RATIO}
LYMPHOCYTES # BLD AUTO: 2.05 10*3/MM3 (ref 1–3)
LYMPHOCYTES NFR BLD AUTO: 36.5 % (ref 16–46)
MCH RBC QN AUTO: 31.1 PG (ref 27–33)
MCHC RBC AUTO-ENTMCNC: 33.6 G/DL (ref 33–37)
MCV RBC AUTO: 92.6 FL (ref 80–94)
MONOCYTES # BLD AUTO: 0.69 10*3/MM3 (ref 0.1–0.9)
MONOCYTES NFR BLD AUTO: 12.3 % (ref 0–12)
NEUTROPHILS # BLD AUTO: 2.6 10*3/MM3 (ref 1.4–6.5)
NEUTROPHILS NFR BLD AUTO: 46.4 % (ref 40–75)
OSMOLALITY SERPL CALC.SUM OF ELEC: 285.1 MOSM/KG (ref 273–305)
PLATELET # BLD AUTO: 256 10*3/MM3 (ref 130–400)
PMV BLD AUTO: 10.9 FL (ref 6–10)
POTASSIUM BLD-SCNC: 4.2 MMOL/L (ref 3.5–5.3)
PROT SERPL-MCNC: 7 G/DL (ref 6–8)
RBC # BLD AUTO: 4.34 10*6/MM3 (ref 4.2–5.4)
SODIUM BLD-SCNC: 143 MMOL/L (ref 135–153)
TRIGL SERPL-MCNC: 108 MG/DL (ref 0–150)
TSH SERPL DL<=0.05 MIU/L-ACNC: 5.75 MIU/ML (ref 0.55–4.78)
VIT B12 BLD-MCNC: 1623 PG/ML (ref 211–911)
VLDLC SERPL-MCNC: 21.6 MG/DL
WBC NRBC COR # BLD: 5.61 10*3/MM3 (ref 4.5–12.5)

## 2018-02-13 PROCEDURE — 82607 VITAMIN B-12: CPT | Performed by: NURSE PRACTITIONER

## 2018-02-13 PROCEDURE — 80053 COMPREHEN METABOLIC PANEL: CPT | Performed by: NURSE PRACTITIONER

## 2018-02-13 PROCEDURE — 83036 HEMOGLOBIN GLYCOSYLATED A1C: CPT | Performed by: NURSE PRACTITIONER

## 2018-02-13 PROCEDURE — 82043 UR ALBUMIN QUANTITATIVE: CPT | Performed by: NURSE PRACTITIONER

## 2018-02-13 PROCEDURE — 80061 LIPID PANEL: CPT | Performed by: NURSE PRACTITIONER

## 2018-02-13 PROCEDURE — 85025 COMPLETE CBC W/AUTO DIFF WBC: CPT | Performed by: NURSE PRACTITIONER

## 2018-02-13 PROCEDURE — 36415 COLL VENOUS BLD VENIPUNCTURE: CPT

## 2018-02-13 PROCEDURE — 84443 ASSAY THYROID STIM HORMONE: CPT | Performed by: NURSE PRACTITIONER

## 2018-02-13 PROCEDURE — 82306 VITAMIN D 25 HYDROXY: CPT | Performed by: NURSE PRACTITIONER

## 2018-02-21 ENCOUNTER — OFFICE VISIT (OUTPATIENT)
Dept: FAMILY MEDICINE CLINIC | Facility: CLINIC | Age: 73
End: 2018-02-21

## 2018-02-21 VITALS
WEIGHT: 137 LBS | TEMPERATURE: 99.1 F | HEART RATE: 80 BPM | HEIGHT: 60 IN | DIASTOLIC BLOOD PRESSURE: 80 MMHG | BODY MASS INDEX: 26.9 KG/M2 | SYSTOLIC BLOOD PRESSURE: 120 MMHG | OXYGEN SATURATION: 98 %

## 2018-02-21 DIAGNOSIS — R23.8 DRY SCALP: ICD-10-CM

## 2018-02-21 DIAGNOSIS — I10 ESSENTIAL HYPERTENSION: ICD-10-CM

## 2018-02-21 DIAGNOSIS — G89.29 CHRONIC PAIN OF RIGHT KNEE: ICD-10-CM

## 2018-02-21 DIAGNOSIS — G25.81 RLS (RESTLESS LEGS SYNDROME): ICD-10-CM

## 2018-02-21 DIAGNOSIS — J30.2 OTHER SEASONAL ALLERGIC RHINITIS: ICD-10-CM

## 2018-02-21 DIAGNOSIS — Z79.899 HIGH RISK MEDICATION USE: Primary | ICD-10-CM

## 2018-02-21 DIAGNOSIS — M19.90 ARTHRITIS: ICD-10-CM

## 2018-02-21 DIAGNOSIS — K59.04 CHRONIC IDIOPATHIC CONSTIPATION: ICD-10-CM

## 2018-02-21 DIAGNOSIS — S09.90XS HEADACHES DUE TO OLD HEAD TRAUMA: ICD-10-CM

## 2018-02-21 DIAGNOSIS — G44.309 HEADACHES DUE TO OLD HEAD TRAUMA: ICD-10-CM

## 2018-02-21 DIAGNOSIS — E03.9 ACQUIRED HYPOTHYROIDISM: ICD-10-CM

## 2018-02-21 DIAGNOSIS — G25.81 RESTLESS LEG SYNDROME: ICD-10-CM

## 2018-02-21 DIAGNOSIS — E78.2 MIXED HYPERLIPIDEMIA: ICD-10-CM

## 2018-02-21 DIAGNOSIS — E11.9 TYPE 2 DIABETES MELLITUS WITHOUT COMPLICATION, WITHOUT LONG-TERM CURRENT USE OF INSULIN (HCC): ICD-10-CM

## 2018-02-21 DIAGNOSIS — M25.561 CHRONIC PAIN OF RIGHT KNEE: ICD-10-CM

## 2018-02-21 DIAGNOSIS — K21.00 GASTROESOPHAGEAL REFLUX DISEASE WITH ESOPHAGITIS: ICD-10-CM

## 2018-02-21 DIAGNOSIS — J45.40 MODERATE PERSISTENT ASTHMA WITHOUT COMPLICATION: ICD-10-CM

## 2018-02-21 PROCEDURE — 99214 OFFICE O/P EST MOD 30 MIN: CPT | Performed by: NURSE PRACTITIONER

## 2018-02-21 RX ORDER — LEVOTHYROXINE SODIUM 0.05 MG/1
50 TABLET ORAL DAILY
Qty: 90 TABLET | Refills: 3 | Status: SHIPPED | OUTPATIENT
Start: 2018-02-21 | End: 2018-04-17 | Stop reason: SINTOL

## 2018-02-21 RX ORDER — CALCIPOTRIENE 0.05 MG/ML
3-5 SOLUTION TOPICAL NIGHTLY
Qty: 60 ML | Refills: 5 | Status: SHIPPED | OUTPATIENT
Start: 2018-02-21 | End: 2018-04-17 | Stop reason: SDUPTHER

## 2018-02-21 RX ORDER — VERAPAMIL HYDROCHLORIDE 240 MG/1
240 TABLET, FILM COATED, EXTENDED RELEASE ORAL NIGHTLY
Qty: 90 TABLET | Refills: 3 | Status: SHIPPED | OUTPATIENT
Start: 2018-02-21 | End: 2018-08-29 | Stop reason: SDUPTHER

## 2018-02-21 RX ORDER — MONTELUKAST SODIUM 10 MG/1
10 TABLET ORAL NIGHTLY
Qty: 90 TABLET | Refills: 3 | Status: SHIPPED | OUTPATIENT
Start: 2018-02-21 | End: 2018-02-21 | Stop reason: SDUPTHER

## 2018-02-21 RX ORDER — IBUPROFEN 800 MG/1
800 TABLET ORAL EVERY 8 HOURS PRN
Qty: 90 TABLET | Refills: 3 | Status: SHIPPED | OUTPATIENT
Start: 2018-02-21 | End: 2018-02-21 | Stop reason: SDUPTHER

## 2018-02-21 RX ORDER — CALCIPOTRIENE 0.05 MG/ML
3-5 SOLUTION TOPICAL NIGHTLY
Qty: 60 ML | Refills: 5 | Status: SHIPPED | OUTPATIENT
Start: 2018-02-21 | End: 2018-02-21 | Stop reason: SDUPTHER

## 2018-02-21 RX ORDER — ROSUVASTATIN CALCIUM 5 MG/1
5 TABLET, COATED ORAL DAILY
Qty: 90 TABLET | Refills: 3 | Status: SHIPPED | OUTPATIENT
Start: 2018-02-21 | End: 2018-02-21 | Stop reason: SDUPTHER

## 2018-02-21 RX ORDER — ROSUVASTATIN CALCIUM 5 MG/1
5 TABLET, COATED ORAL DAILY
Qty: 90 TABLET | Refills: 3 | Status: SHIPPED | OUTPATIENT
Start: 2018-02-21 | End: 2018-05-02 | Stop reason: SINTOL

## 2018-02-21 RX ORDER — TRAMADOL HYDROCHLORIDE 100 MG/1
100 TABLET, EXTENDED RELEASE ORAL DAILY
Qty: 30 TABLET | Refills: 2 | Status: SHIPPED | OUTPATIENT
Start: 2018-02-21 | End: 2018-04-17 | Stop reason: SDUPTHER

## 2018-02-21 RX ORDER — ROPINIROLE 2 MG/1
2 TABLET, FILM COATED ORAL NIGHTLY
Qty: 90 TABLET | Refills: 3 | Status: SHIPPED | OUTPATIENT
Start: 2018-02-21 | End: 2018-02-21 | Stop reason: SDUPTHER

## 2018-02-21 RX ORDER — LEVOTHYROXINE SODIUM 0.05 MG/1
50 TABLET ORAL DAILY
Qty: 30 TABLET | Refills: 5 | Status: SHIPPED | OUTPATIENT
Start: 2018-02-21 | End: 2018-02-21 | Stop reason: SDUPTHER

## 2018-02-21 RX ORDER — BACLOFEN 10 MG/1
10 TABLET ORAL DAILY PRN
Qty: 90 TABLET | Refills: 3 | Status: SHIPPED | OUTPATIENT
Start: 2018-02-21 | End: 2018-02-21 | Stop reason: SDUPTHER

## 2018-02-21 RX ORDER — RANITIDINE 150 MG/1
150 TABLET ORAL 2 TIMES DAILY
Qty: 180 TABLET | Refills: 5 | Status: SHIPPED | OUTPATIENT
Start: 2018-02-21 | End: 2018-03-22 | Stop reason: HOSPADM

## 2018-02-21 RX ORDER — BACLOFEN 10 MG/1
10 TABLET ORAL DAILY PRN
Qty: 90 TABLET | Refills: 3 | Status: SHIPPED | OUTPATIENT
Start: 2018-02-21 | End: 2018-08-29 | Stop reason: SDUPTHER

## 2018-02-21 RX ORDER — CETIRIZINE HYDROCHLORIDE 10 MG/1
10 TABLET ORAL DAILY
Qty: 90 TABLET | Refills: 3 | Status: SHIPPED | OUTPATIENT
Start: 2018-02-21 | End: 2018-08-29 | Stop reason: SDUPTHER

## 2018-02-21 RX ORDER — VERAPAMIL HYDROCHLORIDE 240 MG/1
240 TABLET, FILM COATED, EXTENDED RELEASE ORAL NIGHTLY
Qty: 90 TABLET | Refills: 3 | Status: SHIPPED | OUTPATIENT
Start: 2018-02-21 | End: 2018-02-21 | Stop reason: SDUPTHER

## 2018-02-21 RX ORDER — CETIRIZINE HYDROCHLORIDE 10 MG/1
10 TABLET ORAL DAILY
Qty: 90 TABLET | Refills: 3 | Status: SHIPPED | OUTPATIENT
Start: 2018-02-21 | End: 2018-02-21 | Stop reason: SDUPTHER

## 2018-02-21 RX ORDER — ROPINIROLE 2 MG/1
2 TABLET, FILM COATED ORAL NIGHTLY
Qty: 90 TABLET | Refills: 3 | Status: SHIPPED | OUTPATIENT
Start: 2018-02-21 | End: 2018-08-29 | Stop reason: SDUPTHER

## 2018-02-21 RX ORDER — RANITIDINE 150 MG/1
150 TABLET ORAL 2 TIMES DAILY
Qty: 180 TABLET | Refills: 5 | Status: SHIPPED | OUTPATIENT
Start: 2018-02-21 | End: 2018-02-21 | Stop reason: SDUPTHER

## 2018-02-21 RX ORDER — IBUPROFEN 800 MG/1
800 TABLET ORAL EVERY 8 HOURS PRN
Qty: 90 TABLET | Refills: 3 | Status: SHIPPED | OUTPATIENT
Start: 2018-02-21 | End: 2018-05-02

## 2018-02-21 RX ORDER — MONTELUKAST SODIUM 10 MG/1
10 TABLET ORAL NIGHTLY
Qty: 90 TABLET | Refills: 3 | Status: SHIPPED | OUTPATIENT
Start: 2018-02-21 | End: 2018-08-29 | Stop reason: SDUPTHER

## 2018-02-21 RX ORDER — HYDROCODONE BITARTRATE AND ACETAMINOPHEN 7.5; 325 MG/1; MG/1
1 TABLET ORAL EVERY 6 HOURS PRN
Qty: 60 TABLET | Refills: 0 | Status: SHIPPED | OUTPATIENT
Start: 2018-02-21 | End: 2018-04-17 | Stop reason: SDUPTHER

## 2018-02-21 NOTE — PROGRESS NOTES
Subjective   Tyler Cabello is a 72 y.o. female.     Chief Complaint   Patient presents with   • Knee Pain   • Hypertension       History of Present Illness     Right knee pain-not improving.  Request joint injection to be scheduled.    Labs to review today.    New problem of dry scalp    Hypertension - does check her blood pressure randomly at home or community with readings reported within acceptable range.     Type 2 diabetes - no hyperglycemic or hypoglycemic symptomatic episodes.  Patient monitors her glucose level randomly with acceptable readings.  She tries to control her type 2 diabetes with diet and lifestyle.     Chronic osteoarthritis pain - rates her pain as 6 on a pain scale rating of 0-10.  Pain is located in her neck and low back and hands.  Her pain has been present for many years.  She has had spinal surgery in the past.  She has been seen by ortho and neurology in the past.  She does at times take ibuprofen for mild pain relief.  Her pain is described as stiffness, aching, and sharp at times.  Pain is worse with overuse of associated joints.  Rest and heat help only minimally.  Her pain does at times prevent her from doing the things she enjoys such as sewing, yardwork and playing with her granddaughter.  She reports that Ultram does ease her pain and stiffness, allowing her to better in enjoy her life.At times her pain is more severe and she must take Norco.   She has no history of substance abuse or addiction.    Pt has recently been in physical therapy.         Hand pain and stiffness-acute exacerbation which is worse in the morning.  Rates this pain as 8 on pain scale rating of 0-10.      The following portions of the patient's history were reviewed and updated as appropriate: allergies, current medications, past family history, past medical history, past social history, past surgical history and problem list.    Review of Systems   Constitutional: Positive for fatigue. Negative for activity  "change, appetite change, chills and unexpected weight change.   HENT: Negative.    Eyes: Negative.    Respiratory: Negative for cough, chest tightness, shortness of breath and wheezing.    Cardiovascular: Negative for chest pain, palpitations and leg swelling.   Gastrointestinal: Positive for constipation.   Endocrine: Negative.    Genitourinary: Negative for dysuria, flank pain and urgency.   Musculoskeletal: Positive for arthralgias, back pain, joint swelling (hands), myalgias and neck pain. Negative for neck stiffness.   Skin: Negative for color change.   Allergic/Immunologic: Positive for environmental allergies.   Neurological: Negative.    Hematological: Negative.    Psychiatric/Behavioral: Negative for dysphoric mood, self-injury and suicidal ideas.       Objective     /80  Pulse 80  Temp 99.1 °F (37.3 °C) (Tympanic)   Ht 152.4 cm (60\")  Wt 62.1 kg (137 lb)  LMP 07/08/1986 (Within Months)  SpO2 98%  BMI 26.76 kg/m2  Lab on 02/13/2018   Component Date Value Ref Range Status   • Glucose 02/13/2018 93  70 - 110 mg/dL Final   • BUN 02/13/2018 14  7 - 21 mg/dL Final   • Creatinine 02/13/2018 0.92  0.43 - 1.29 mg/dL Final   • Sodium 02/13/2018 143  135 - 153 mmol/L Final   • Potassium 02/13/2018 4.2  3.5 - 5.3 mmol/L Final   • Chloride 02/13/2018 107  99 - 112 mmol/L Final   • CO2 02/13/2018 29.0  24.3 - 31.9 mmol/L Final   • Calcium 02/13/2018 9.4  7.7 - 10.0 mg/dL Final   • Total Protein 02/13/2018 7.0  6.0 - 8.0 g/dL Final   • Albumin 02/13/2018 4.60  3.40 - 4.80 g/dL Final   • ALT (SGPT) 02/13/2018 45* 10 - 36 U/L Final   • AST (SGOT) 02/13/2018 28  10 - 30 U/L Final   • Alkaline Phosphatase 02/13/2018 55  35 - 104 U/L Final   • Total Bilirubin 02/13/2018 0.4  0.2 - 1.8 mg/dL Final   • eGFR Non African Amer 02/13/2018 60* >60 mL/min/1.73 Final   • Globulin 02/13/2018 2.4  gm/dL Final   • A/G Ratio 02/13/2018 1.9  1.5 - 2.5 g/dL Final   • BUN/Creatinine Ratio 02/13/2018 15.2  7.0 - 25.0 Final   • " Anion Gap 02/13/2018 7.0  3.6 - 11.2 mmol/L Final   • TSH 02/13/2018 5.746* 0.550 - 4.780 mIU/mL Final   • Hemoglobin A1C 02/13/2018 6.10* 4.50 - 5.70 % Final   • 25 Hydroxy, Vitamin D 02/13/2018 41.0  ng/ml Final   • Microalbumin, Urine 02/13/2018 3.3  mg/L Final   • Total Cholesterol 02/13/2018 159  0 - 200 mg/dL Final   • Triglycerides 02/13/2018 108  0 - 150 mg/dL Final   • HDL Cholesterol 02/13/2018 49* 60 - 100 mg/dL Final   • LDL Cholesterol  02/13/2018 88  0 - 100 mg/dL Final   • VLDL Cholesterol 02/13/2018 21.6  mg/dL Final   • LDL/HDL Ratio 02/13/2018 1.80   Final   • Vitamin B-12 02/13/2018 1623* 211 - 911 pg/mL Final   • WBC 02/13/2018 5.61  4.50 - 12.50 10*3/mm3 Final   • RBC 02/13/2018 4.34  4.20 - 5.40 10*6/mm3 Final   • Hemoglobin 02/13/2018 13.5  12.0 - 16.0 g/dL Final   • Hematocrit 02/13/2018 40.2  37.0 - 47.0 % Final   • MCV 02/13/2018 92.6  80.0 - 94.0 fL Final   • MCH 02/13/2018 31.1  27.0 - 33.0 pg Final   • MCHC 02/13/2018 33.6  33.0 - 37.0 g/dL Final   • RDW 02/13/2018 12.7  11.5 - 14.5 % Final   • RDW-SD 02/13/2018 41.8  37.0 - 54.0 fl Final   • MPV 02/13/2018 10.9* 6.0 - 10.0 fL Final   • Platelets 02/13/2018 256  130 - 400 10*3/mm3 Final   • Neutrophil % 02/13/2018 46.4  40.0 - 75.0 % Final   • Lymphocyte % 02/13/2018 36.5  16.0 - 46.0 % Final   • Monocyte % 02/13/2018 12.3* 0.0 - 12.0 % Final   • Eosinophil % 02/13/2018 4.3  0.0 - 7.0 % Final   • Basophil % 02/13/2018 0.5  0.0 - 2.0 % Final   • Immature Grans % 02/13/2018 0.0  0.0 - 0.5 % Final   • Neutrophils, Absolute 02/13/2018 2.60  1.40 - 6.50 10*3/mm3 Final   • Lymphocytes, Absolute 02/13/2018 2.05  1.00 - 3.00 10*3/mm3 Final   • Monocytes, Absolute 02/13/2018 0.69  0.10 - 0.90 10*3/mm3 Final   • Eosinophils, Absolute 02/13/2018 0.24  0.00 - 0.70 10*3/mm3 Final   • Basophils, Absolute 02/13/2018 0.03  0.00 - 0.30 10*3/mm3 Final   • Immature Grans, Absolute 02/13/2018 0.00  0.00 - 0.03 10*3/mm3 Final   • Osmolality Calc 02/13/2018  285.1  273.0 - 305.0 mOsm/kg Final       Physical Exam   Constitutional: She is oriented to person, place, and time. Vital signs are normal. She appears well-developed and well-nourished. No distress.   Very pleasant adult female   HENT:   Head: Normocephalic.   Right Ear: External ear normal.   Left Ear: External ear normal.   Nose: Nose normal.   Mouth/Throat: Oropharynx is clear and moist. No oropharyngeal exudate.   Eyes: Pupils are equal, round, and reactive to light.   Glasses     Neck: Normal range of motion. Neck supple. No tracheal deviation present. No thyromegaly present.   Cardiovascular: Normal rate, regular rhythm and normal heart sounds.  Exam reveals no gallop and no friction rub.    No murmur heard.  Pulmonary/Chest: Effort normal and breath sounds normal. No respiratory distress. She has no wheezes. She has no rales. She exhibits no tenderness.   Abdominal: Soft. Bowel sounds are normal. She exhibits no distension and no mass. There is no tenderness. There is no rebound and no guarding.   Musculoskeletal:        Right shoulder: She exhibits decreased range of motion, crepitus and spasm.        Right knee: Tenderness found. Patellar tendon tenderness noted.        Lumbar back: She exhibits tenderness.        Right hand: She exhibits deformity.        Left hand: She exhibits deformity.   Ulnar deviation and arthritic nodules in Pips and dips    Lymphadenopathy:     She has no cervical adenopathy.   Neurological: She is alert and oriented to person, place, and time. She has normal reflexes.   CN 2-12 grossly intact    Skin: Skin is warm and dry. No rash noted. She is not diaphoretic. No erythema.   Psychiatric: She has a normal mood and affect. Her speech is normal and behavior is normal. Judgment and thought content normal. Cognition and memory are normal.   Vitals reviewed.      Assessment/Plan     Problem List Items Addressed This Visit        Cardiovascular and Mediastinum    Mixed hyperlipidemia     Relevant Medications    rosuvastatin (CRESTOR) 5 MG tablet    Hypertension    Relevant Medications    verapamil SR (CALAN-SR) 240 MG CR tablet    Other Relevant Orders    Comprehensive Metabolic Panel    TSH       Respiratory    Asthma    Relevant Medications    montelukast (SINGULAIR) 10 MG tablet       Digestive    Gastroesophageal reflux disease with esophagitis    Relevant Medications    raNITIdine (ZANTAC) 150 MG tablet    Constipation       Endocrine    Type 2 diabetes mellitus without complication    Relevant Orders    Hemoglobin A1c    Lipid Panel    Vitamin B12    Acquired hypothyroidism    Current Assessment & Plan     Newly identified will start Synthroid.         Relevant Medications    levothyroxine (SYNTHROID) 50 MCG tablet    Other Relevant Orders    CBC & Differential    Comprehensive Metabolic Panel    TSH    Hemoglobin A1c    Vitamin D 25 Hydroxy    Lipid Panel    Vitamin B12    Sedimentation Rate    C-reactive Protein    FELICIA    Rheumatoid Factor       Nervous and Auditory    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       Musculoskeletal and Integument    Chronic pain of right knee    Current Assessment & Plan     Schedule joint injection for next week              Other    RLS (restless legs syndrome)    Dry scalp    Overview     New diagnosis         Relevant Medications    Calcipotriene 0.005 % ointment      Other Visit Diagnoses     High risk medication use    -  Primary    Relevant Orders    Urine Drug Screen - Urine, Clean Catch    Restless leg syndrome        Relevant Medications    rOPINIRole (REQUIP) 2 MG tablet    baclofen (LIORESAL) 10 MG tablet    Other seasonal allergic rhinitis        stable    Relevant Medications    montelukast (SINGULAIR) 10 MG tablet    cetirizine (zyrTEC) 10 MG tablet    Arthritis        body mechanics   refill ultram  aicha and uds on file    Relevant Medications    ibuprofen (ADVIL,MOTRIN) 800 MG tablet    Other Relevant Orders    Sedimentation Rate    C-reactive  Protein    FELICIA    Rheumatoid Factor      We will obtain arthritis/rheumatology labs for screening at next routine lab draw.  Stop daily b 12 supplement.   Start synthroid 50 mcg for new dx of Hypothyroidism.  Labs from 2/18 reviewed and discussed.   Diaz has been reviewed as consistent.  Uds is on file.   Goal: Improved quality of life and reduction in pain as evidenced by pt report.   Patient has been instructed and counseled regarding opioid misuse and risk of addiction.  We have discussed proper storage and disposal of controlled medication.  As part of this patient's treatment plan they are being prescribed controlled substance/substances with potential for abuse. This patient has been made aware of the appropriate use of such medications, including potential risk of somnolence, limited ability to drive and / or work safely, and potential for overdose. It has also been made clear that these medications are for use by this patient only, without concomitant use of alcohol or other substances unless prescribed/advised by medical provider. Patient has completed controlled substance agreement detailing terms of continued prescribing of controlled substances including monitoring Diaz reports, drug screens and pill counts. The patient was asked and states they are not receiving narcotic medication from any there provider or any provider that this office has not been made aware of. History and physical exam exhibit continued safe and appropriate use of controlled substances.   Proper body mechanics has been reviewed and discussed today.  Recommend carpal tunnel braces except during shower.     Follow up in 1-3 months, labs in 3 months, sooner if needed.  Next week for knee injection.     Errors in dictation may reflect use of voice recognition software and not all errors in transcription may have been detected prior to signing.                This document has been electronically signed by:  Miranda Bo  APRN, NP-C

## 2018-02-21 NOTE — PROGRESS NOTES
"Cathryn Cabello is a 72 y.o. female.     Chief Complaint   Patient presents with   • Knee Pain   • Hypertension       History of Present Illness     The following portions of the patient's history were reviewed and updated as appropriate: allergies, current medications, past family history, past medical history, past social history, past surgical history and problem list.    Review of Systems   Constitutional: Negative.    HENT: Negative.    Eyes: Negative.    Musculoskeletal: Positive for arthralgias, back pain, myalgias and neck pain.   Allergic/Immunologic: Positive for environmental allergies.   All other systems reviewed and are negative.      Objective     /80  Pulse 80  Temp 99.1 °F (37.3 °C) (Tympanic)   Ht 152.4 cm (60\")  Wt 62.1 kg (137 lb)  LMP 07/08/1986 (Within Months)  SpO2 98%  BMI 26.76 kg/m2  Lab on 02/13/2018   Component Date Value Ref Range Status   • Glucose 02/13/2018 93  70 - 110 mg/dL Final   • BUN 02/13/2018 14  7 - 21 mg/dL Final   • Creatinine 02/13/2018 0.92  0.43 - 1.29 mg/dL Final   • Sodium 02/13/2018 143  135 - 153 mmol/L Final   • Potassium 02/13/2018 4.2  3.5 - 5.3 mmol/L Final   • Chloride 02/13/2018 107  99 - 112 mmol/L Final   • CO2 02/13/2018 29.0  24.3 - 31.9 mmol/L Final   • Calcium 02/13/2018 9.4  7.7 - 10.0 mg/dL Final   • Total Protein 02/13/2018 7.0  6.0 - 8.0 g/dL Final   • Albumin 02/13/2018 4.60  3.40 - 4.80 g/dL Final   • ALT (SGPT) 02/13/2018 45* 10 - 36 U/L Final   • AST (SGOT) 02/13/2018 28  10 - 30 U/L Final   • Alkaline Phosphatase 02/13/2018 55  35 - 104 U/L Final   • Total Bilirubin 02/13/2018 0.4  0.2 - 1.8 mg/dL Final   • eGFR Non African Amer 02/13/2018 60* >60 mL/min/1.73 Final   • Globulin 02/13/2018 2.4  gm/dL Final   • A/G Ratio 02/13/2018 1.9  1.5 - 2.5 g/dL Final   • BUN/Creatinine Ratio 02/13/2018 15.2  7.0 - 25.0 Final   • Anion Gap 02/13/2018 7.0  3.6 - 11.2 mmol/L Final   • TSH 02/13/2018 5.746* 0.550 - 4.780 mIU/mL Final   • " Hemoglobin A1C 02/13/2018 6.10* 4.50 - 5.70 % Final   • 25 Hydroxy, Vitamin D 02/13/2018 41.0  ng/ml Final   • Microalbumin, Urine 02/13/2018 3.3  mg/L Final   • Total Cholesterol 02/13/2018 159  0 - 200 mg/dL Final   • Triglycerides 02/13/2018 108  0 - 150 mg/dL Final   • HDL Cholesterol 02/13/2018 49* 60 - 100 mg/dL Final   • LDL Cholesterol  02/13/2018 88  0 - 100 mg/dL Final   • VLDL Cholesterol 02/13/2018 21.6  mg/dL Final   • LDL/HDL Ratio 02/13/2018 1.80   Final   • Vitamin B-12 02/13/2018 1623* 211 - 911 pg/mL Final   • WBC 02/13/2018 5.61  4.50 - 12.50 10*3/mm3 Final   • RBC 02/13/2018 4.34  4.20 - 5.40 10*6/mm3 Final   • Hemoglobin 02/13/2018 13.5  12.0 - 16.0 g/dL Final   • Hematocrit 02/13/2018 40.2  37.0 - 47.0 % Final   • MCV 02/13/2018 92.6  80.0 - 94.0 fL Final   • MCH 02/13/2018 31.1  27.0 - 33.0 pg Final   • MCHC 02/13/2018 33.6  33.0 - 37.0 g/dL Final   • RDW 02/13/2018 12.7  11.5 - 14.5 % Final   • RDW-SD 02/13/2018 41.8  37.0 - 54.0 fl Final   • MPV 02/13/2018 10.9* 6.0 - 10.0 fL Final   • Platelets 02/13/2018 256  130 - 400 10*3/mm3 Final   • Neutrophil % 02/13/2018 46.4  40.0 - 75.0 % Final   • Lymphocyte % 02/13/2018 36.5  16.0 - 46.0 % Final   • Monocyte % 02/13/2018 12.3* 0.0 - 12.0 % Final   • Eosinophil % 02/13/2018 4.3  0.0 - 7.0 % Final   • Basophil % 02/13/2018 0.5  0.0 - 2.0 % Final   • Immature Grans % 02/13/2018 0.0  0.0 - 0.5 % Final   • Neutrophils, Absolute 02/13/2018 2.60  1.40 - 6.50 10*3/mm3 Final   • Lymphocytes, Absolute 02/13/2018 2.05  1.00 - 3.00 10*3/mm3 Final   • Monocytes, Absolute 02/13/2018 0.69  0.10 - 0.90 10*3/mm3 Final   • Eosinophils, Absolute 02/13/2018 0.24  0.00 - 0.70 10*3/mm3 Final   • Basophils, Absolute 02/13/2018 0.03  0.00 - 0.30 10*3/mm3 Final   • Immature Grans, Absolute 02/13/2018 0.00  0.00 - 0.03 10*3/mm3 Final   • Osmolality Calc 02/13/2018 285.1  273.0 - 305.0 mOsm/kg Final       Physical Exam    Assessment/Plan     Problem List Items Addressed  This Visit        Cardiovascular and Mediastinum    Mixed hyperlipidemia    Relevant Medications    rosuvastatin (CRESTOR) 5 MG tablet    Hypertension    Relevant Medications    verapamil SR (CALAN-SR) 240 MG CR tablet       Respiratory    Asthma    Relevant Medications    montelukast (SINGULAIR) 10 MG tablet       Digestive    Gastroesophageal reflux disease with esophagitis    Relevant Medications    raNITIdine (ZANTAC) 150 MG tablet    Constipation       Endocrine    Type 2 diabetes mellitus without complication       Nervous and Auditory    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       Other    RLS (restless legs syndrome)      Other Visit Diagnoses     High risk medication use    -  Primary    Relevant Orders    Urine Drug Screen - Urine, Clean Catch    Restless leg syndrome        Relevant Medications    rOPINIRole (REQUIP) 2 MG tablet    baclofen (LIORESAL) 10 MG tablet    Other seasonal allergic rhinitis        stable    Relevant Medications    montelukast (SINGULAIR) 10 MG tablet    cetirizine (zyrTEC) 10 MG tablet    Arthritis        body mechanics   refill ultram  aicha and uds on file    Relevant Medications    ibuprofen (ADVIL,MOTRIN) 800 MG tablet                     This document has been electronically signed by:  ZHOU Moncada, NP-C

## 2018-02-27 ENCOUNTER — PROCEDURE VISIT (OUTPATIENT)
Dept: FAMILY MEDICINE CLINIC | Facility: CLINIC | Age: 73
End: 2018-02-27

## 2018-02-27 VITALS
OXYGEN SATURATION: 98 % | WEIGHT: 134 LBS | DIASTOLIC BLOOD PRESSURE: 80 MMHG | HEART RATE: 80 BPM | BODY MASS INDEX: 26.31 KG/M2 | TEMPERATURE: 98.4 F | SYSTOLIC BLOOD PRESSURE: 132 MMHG | HEIGHT: 60 IN

## 2018-02-27 DIAGNOSIS — M25.561 CHRONIC PAIN OF BOTH KNEES: ICD-10-CM

## 2018-02-27 DIAGNOSIS — M25.562 CHRONIC PAIN OF BOTH KNEES: ICD-10-CM

## 2018-02-27 DIAGNOSIS — J01.01 ACUTE RECURRENT MAXILLARY SINUSITIS: Primary | ICD-10-CM

## 2018-02-27 DIAGNOSIS — G89.29 CHRONIC PAIN OF BOTH KNEES: ICD-10-CM

## 2018-02-27 PROCEDURE — 99213 OFFICE O/P EST LOW 20 MIN: CPT | Performed by: NURSE PRACTITIONER

## 2018-02-27 PROCEDURE — 20610 DRAIN/INJ JOINT/BURSA W/O US: CPT | Performed by: NURSE PRACTITIONER

## 2018-02-27 RX ORDER — GUAIFENESIN, PSEUDOEPHEDRINE HYDROCHLORIDE 600; 60 MG/1; MG/1
1 TABLET, EXTENDED RELEASE ORAL 2 TIMES DAILY PRN
Qty: 30 TABLET | Refills: 2 | Status: ON HOLD | OUTPATIENT
Start: 2018-02-27 | End: 2018-03-22

## 2018-02-27 RX ORDER — AZITHROMYCIN 250 MG/1
TABLET, FILM COATED ORAL
Qty: 6 TABLET | Refills: 0 | Status: SHIPPED | OUTPATIENT
Start: 2018-02-27 | End: 2018-03-12

## 2018-02-27 NOTE — ASSESSMENT & PLAN NOTE
"Patient has been educated today regarding procedure.  We have discussed the risk versus benefits of this procedure including \"red man\" syndrome, infection, injury, discomfort and allergic reaction.  Patient is aware that this is not an all inclusive list of possible side effects.  We have discussed alternative treatments.  The patient verbalizes understanding of this information.  Their questions were sought and answered.  Patient verbalizes consent to proceed with procedure today.  I have discussed with the patient that full benefit of joint injection may not be felt for several weeks.  They may experience soreness but should not experience increased pain.  Remain mobile and perform range of motion to the affected joint often throughout the day but avoid overuse for the next several days.  Return to usual activity in one week.  Patient has been advised to call the office or return to the office with any questions.  We have also discussed reasons to seek urgent or emergent medical care.  Patient states understanding of all instructions.  "

## 2018-03-12 ENCOUNTER — OFFICE VISIT (OUTPATIENT)
Dept: FAMILY MEDICINE CLINIC | Facility: CLINIC | Age: 73
End: 2018-03-12

## 2018-03-12 VITALS
HEIGHT: 60 IN | HEART RATE: 84 BPM | OXYGEN SATURATION: 97 % | BODY MASS INDEX: 25.52 KG/M2 | TEMPERATURE: 99.7 F | SYSTOLIC BLOOD PRESSURE: 140 MMHG | WEIGHT: 130 LBS | DIASTOLIC BLOOD PRESSURE: 80 MMHG

## 2018-03-12 DIAGNOSIS — K59.04 CHRONIC IDIOPATHIC CONSTIPATION: ICD-10-CM

## 2018-03-12 DIAGNOSIS — E11.9 TYPE 2 DIABETES MELLITUS WITHOUT COMPLICATION, WITHOUT LONG-TERM CURRENT USE OF INSULIN (HCC): ICD-10-CM

## 2018-03-12 DIAGNOSIS — K21.00 GASTROESOPHAGEAL REFLUX DISEASE WITH ESOPHAGITIS: Primary | ICD-10-CM

## 2018-03-12 LAB
BASOPHILS # BLD AUTO: 0.03 10*3/MM3 (ref 0–0.3)
BASOPHILS NFR BLD AUTO: 0.4 % (ref 0–2)
DEPRECATED RDW RBC AUTO: 41.2 FL (ref 37–54)
EOSINOPHIL # BLD AUTO: 0.06 10*3/MM3 (ref 0–0.7)
EOSINOPHIL NFR BLD AUTO: 0.8 % (ref 0–7)
ERYTHROCYTE [DISTWIDTH] IN BLOOD BY AUTOMATED COUNT: 12.8 % (ref 11.5–14.5)
HCT VFR BLD AUTO: 40.9 % (ref 37–47)
HGB BLD-MCNC: 14.1 G/DL (ref 12–16)
IMM GRANULOCYTES # BLD: 0.01 10*3/MM3 (ref 0–0.03)
IMM GRANULOCYTES NFR BLD: 0.1 % (ref 0–0.5)
LYMPHOCYTES # BLD AUTO: 2.56 10*3/MM3 (ref 1–3)
LYMPHOCYTES NFR BLD AUTO: 32.6 % (ref 16–46)
MCH RBC QN AUTO: 31.2 PG (ref 27–33)
MCHC RBC AUTO-ENTMCNC: 34.5 G/DL (ref 33–37)
MCV RBC AUTO: 90.5 FL (ref 80–94)
MONOCYTES # BLD AUTO: 0.48 10*3/MM3 (ref 0.1–0.9)
MONOCYTES NFR BLD AUTO: 6.1 % (ref 0–12)
NEUTROPHILS # BLD AUTO: 4.72 10*3/MM3 (ref 1.4–6.5)
NEUTROPHILS NFR BLD AUTO: 60 % (ref 40–75)
PLATELET # BLD AUTO: 319 10*3/MM3 (ref 130–400)
PMV BLD AUTO: 10.3 FL (ref 6–10)
RBC # BLD AUTO: 4.52 10*6/MM3 (ref 4.2–5.4)
WBC NRBC COR # BLD: 7.86 10*3/MM3 (ref 4.5–12.5)

## 2018-03-12 PROCEDURE — 36415 COLL VENOUS BLD VENIPUNCTURE: CPT | Performed by: NURSE PRACTITIONER

## 2018-03-12 PROCEDURE — 99214 OFFICE O/P EST MOD 30 MIN: CPT | Performed by: NURSE PRACTITIONER

## 2018-03-12 PROCEDURE — 85025 COMPLETE CBC W/AUTO DIFF WBC: CPT | Performed by: NURSE PRACTITIONER

## 2018-03-12 PROCEDURE — 86677 HELICOBACTER PYLORI ANTIBODY: CPT | Performed by: NURSE PRACTITIONER

## 2018-03-12 RX ORDER — OMEPRAZOLE 40 MG/1
40 CAPSULE, DELAYED RELEASE ORAL DAILY
Qty: 30 CAPSULE | Refills: 5 | Status: SHIPPED | OUTPATIENT
Start: 2018-03-12 | End: 2018-04-17

## 2018-03-12 RX ORDER — AZITHROMYCIN 250 MG/1
TABLET, FILM COATED ORAL
Qty: 6 TABLET | Refills: 0 | Status: ON HOLD | OUTPATIENT
Start: 2018-03-12 | End: 2018-03-22

## 2018-03-12 NOTE — PROGRESS NOTES
Subjective   Tyler Cabello is a 72 y.o. female.     Chief Complaint   Patient presents with   • Abdominal Pain   • Nausea       History of Present Illness       Gastric pain and nausea for over 1 week.  Patient reports having epigastric tenderness and acid reflux.  Describes reflux as a burning sensation in her epigastric region that comes up under her ribs and her throat.  Worse when she lies down.  Acidic foods do seem to bother this.  She did take a family members prilosec which was helpful with her symptoms.     Patient has been seen by Dr. huber in the past for her gallbladder surgery.  She states she would like for him to do an EGD if needed.    GERD-currently taking Zantac which is not being helpful    Type 2 diabetes-stable      The following portions of the patient's history were reviewed and updated as appropriate: allergies, current medications, past family history, past medical history, past social history, past surgical history and problem list.    Review of Systems   Constitutional: Positive for appetite change, chills, fatigue and unexpected weight change.   HENT: Negative.  Negative for congestion, rhinorrhea and sneezing.    Eyes: Negative.    Respiratory: Negative.  Negative for cough.    Cardiovascular: Negative.    Gastrointestinal: Positive for abdominal pain, diarrhea and nausea.   Endocrine: Negative for cold intolerance and heat intolerance.   Genitourinary: Negative for difficulty urinating, dysuria and frequency.   Musculoskeletal: Positive for arthralgias and back pain.        Reports improved joint pain with recent injections    Skin: Negative.    Allergic/Immunologic: Negative.    Neurological: Negative.  Negative for dizziness, numbness and headaches.   Hematological: Negative.    Psychiatric/Behavioral: Positive for sleep disturbance (due to heartburn ). Negative for dysphoric mood, self-injury and suicidal ideas.   All other systems reviewed and are negative.      Objective     BP  "140/80   Pulse 84   Temp 99.7 °F (37.6 °C) (Tympanic)   Ht 152.4 cm (60\")   Wt 59 kg (130 lb)   LMP 07/08/1986 (Within Months)   SpO2 97%   BMI 25.39 kg/m²   Lab on 02/13/2018   Component Date Value Ref Range Status   • Glucose 02/13/2018 93  70 - 110 mg/dL Final   • BUN 02/13/2018 14  7 - 21 mg/dL Final   • Creatinine 02/13/2018 0.92  0.43 - 1.29 mg/dL Final   • Sodium 02/13/2018 143  135 - 153 mmol/L Final   • Potassium 02/13/2018 4.2  3.5 - 5.3 mmol/L Final   • Chloride 02/13/2018 107  99 - 112 mmol/L Final   • CO2 02/13/2018 29.0  24.3 - 31.9 mmol/L Final   • Calcium 02/13/2018 9.4  7.7 - 10.0 mg/dL Final   • Total Protein 02/13/2018 7.0  6.0 - 8.0 g/dL Final   • Albumin 02/13/2018 4.60  3.40 - 4.80 g/dL Final   • ALT (SGPT) 02/13/2018 45* 10 - 36 U/L Final   • AST (SGOT) 02/13/2018 28  10 - 30 U/L Final   • Alkaline Phosphatase 02/13/2018 55  35 - 104 U/L Final   • Total Bilirubin 02/13/2018 0.4  0.2 - 1.8 mg/dL Final   • eGFR Non African Amer 02/13/2018 60* >60 mL/min/1.73 Final   • Globulin 02/13/2018 2.4  gm/dL Final   • A/G Ratio 02/13/2018 1.9  1.5 - 2.5 g/dL Final   • BUN/Creatinine Ratio 02/13/2018 15.2  7.0 - 25.0 Final   • Anion Gap 02/13/2018 7.0  3.6 - 11.2 mmol/L Final   • TSH 02/13/2018 5.746* 0.550 - 4.780 mIU/mL Final   • Hemoglobin A1C 02/13/2018 6.10* 4.50 - 5.70 % Final   • 25 Hydroxy, Vitamin D 02/13/2018 41.0  ng/ml Final   • Microalbumin, Urine 02/13/2018 3.3  mg/L Final   • Total Cholesterol 02/13/2018 159  0 - 200 mg/dL Final   • Triglycerides 02/13/2018 108  0 - 150 mg/dL Final   • HDL Cholesterol 02/13/2018 49* 60 - 100 mg/dL Final   • LDL Cholesterol  02/13/2018 88  0 - 100 mg/dL Final   • VLDL Cholesterol 02/13/2018 21.6  mg/dL Final   • LDL/HDL Ratio 02/13/2018 1.80   Final   • Vitamin B-12 02/13/2018 1623* 211 - 911 pg/mL Final   • WBC 02/13/2018 5.61  4.50 - 12.50 10*3/mm3 Final   • RBC 02/13/2018 4.34  4.20 - 5.40 10*6/mm3 Final   • Hemoglobin 02/13/2018 13.5  12.0 - 16.0 " g/dL Final   • Hematocrit 02/13/2018 40.2  37.0 - 47.0 % Final   • MCV 02/13/2018 92.6  80.0 - 94.0 fL Final   • MCH 02/13/2018 31.1  27.0 - 33.0 pg Final   • MCHC 02/13/2018 33.6  33.0 - 37.0 g/dL Final   • RDW 02/13/2018 12.7  11.5 - 14.5 % Final   • RDW-SD 02/13/2018 41.8  37.0 - 54.0 fl Final   • MPV 02/13/2018 10.9* 6.0 - 10.0 fL Final   • Platelets 02/13/2018 256  130 - 400 10*3/mm3 Final   • Neutrophil % 02/13/2018 46.4  40.0 - 75.0 % Final   • Lymphocyte % 02/13/2018 36.5  16.0 - 46.0 % Final   • Monocyte % 02/13/2018 12.3* 0.0 - 12.0 % Final   • Eosinophil % 02/13/2018 4.3  0.0 - 7.0 % Final   • Basophil % 02/13/2018 0.5  0.0 - 2.0 % Final   • Immature Grans % 02/13/2018 0.0  0.0 - 0.5 % Final   • Neutrophils, Absolute 02/13/2018 2.60  1.40 - 6.50 10*3/mm3 Final   • Lymphocytes, Absolute 02/13/2018 2.05  1.00 - 3.00 10*3/mm3 Final   • Monocytes, Absolute 02/13/2018 0.69  0.10 - 0.90 10*3/mm3 Final   • Eosinophils, Absolute 02/13/2018 0.24  0.00 - 0.70 10*3/mm3 Final   • Basophils, Absolute 02/13/2018 0.03  0.00 - 0.30 10*3/mm3 Final   • Immature Grans, Absolute 02/13/2018 0.00  0.00 - 0.03 10*3/mm3 Final   • Osmolality Calc 02/13/2018 285.1  273.0 - 305.0 mOsm/kg Final       Physical Exam   Constitutional: She is oriented to person, place, and time. She appears well-developed and well-nourished. No distress.   HENT:   Head: Atraumatic.   Mouth/Throat: No oropharyngeal exudate.   Eyes: EOM are normal. Pupils are equal, round, and reactive to light.   Neck: Neck supple. No thyromegaly present.   Cardiovascular: Normal rate, regular rhythm and normal heart sounds.    No murmur heard.  Pulmonary/Chest: Effort normal and breath sounds normal. No respiratory distress. She has no wheezes. She exhibits no tenderness.   Abdominal: Soft. Normal appearance and bowel sounds are normal. She exhibits no distension. There is no hepatosplenomegaly. There is tenderness in the epigastric area. There is no rigidity, no  rebound and no guarding. No hernia.   Lymphadenopathy:     She has no cervical adenopathy.   Neurological: She is alert and oriented to person, place, and time.   Skin: Skin is warm and dry. She is not diaphoretic. No pallor.   Psychiatric: She has a normal mood and affect. Her behavior is normal. Judgment and thought content normal.    low-grade fever is noted.  Patient is to monitor and report continued fever.    Assessment/Plan     Problem List Items Addressed This Visit        Digestive    Gastroesophageal reflux disease with esophagitis - Primary    Relevant Medications    omeprazole (priLOSEC) 40 MG capsule    Other Relevant Orders    Ambulatory referral for Screening EGD    Helicobacter Pylori, IgA IgG IgM    CBC & Differential    CBC Auto Differential    Constipation       Endocrine    Type 2 diabetes mellitus without complication      Other Visit Diagnoses    None.           Cbc and H. Pylori today.  Refer to Dr. Tovar for EGD.  Gerd precautions reviewed. Avoid spicy foods.   Start Prilosec 40 mg daily.   Stop zantac.   Pt has been educated/instructed on diabetic care and protocols.  Diabetic diet instructions provided.  Medication regimen reviewed.  Discussed possible side effects and interactions of current medications.  Sick day rules reviewed. Continue to monitor blood sugar and report abnormal readings as discussed today. Understanding stated by patient.     Follow up in 2-4 weeks, sooner if needed.            This document has been electronically signed by:  ZHOU Moncada, NP-C

## 2018-03-13 LAB
H PYLORI IGA SER IA-ACNC: <9 UNITS (ref 0–8.9)
H PYLORI IGG SER IA-ACNC: 0.35 INDEX VALUE (ref 0–0.79)
H PYLORI IGM SER-ACNC: <9 UNITS (ref 0–8.9)

## 2018-03-14 ENCOUNTER — TELEPHONE (OUTPATIENT)
Dept: FAMILY MEDICINE CLINIC | Facility: CLINIC | Age: 73
End: 2018-03-14

## 2018-03-14 NOTE — TELEPHONE ENCOUNTER
----- Message from ZHOU Villar sent at 3/13/2018  3:19 PM EDT -----  LET HER KNOW HER H PYLORI IS NEGATIVE AND HER CBC LOOKS GOOD.      Spoke with edi and her stomach is feeling much better

## 2018-03-20 ENCOUNTER — OFFICE VISIT (OUTPATIENT)
Dept: SURGERY | Facility: CLINIC | Age: 73
End: 2018-03-20

## 2018-03-20 VITALS
HEIGHT: 60 IN | WEIGHT: 130 LBS | BODY MASS INDEX: 25.52 KG/M2 | SYSTOLIC BLOOD PRESSURE: 126 MMHG | DIASTOLIC BLOOD PRESSURE: 81 MMHG | HEART RATE: 85 BPM

## 2018-03-20 DIAGNOSIS — K21.00 GASTROESOPHAGEAL REFLUX DISEASE WITH ESOPHAGITIS: Primary | ICD-10-CM

## 2018-03-20 PROCEDURE — 99214 OFFICE O/P EST MOD 30 MIN: CPT | Performed by: SURGERY

## 2018-03-20 NOTE — PROGRESS NOTES
Cathryn Cabello is a 72 y.o. female is being seen for consultation today at the request of Miranda Kinsey Brought*    73 yo F with persistent epigastric pain.  This was prior to cholecystectomy last year.  Pain in subxyphoid region radiating to the LUQ.  H. Pylori negative.  No unintentional weight loss.  Pain persists despite PPI therapy for several months.        Past Medical History:   Diagnosis Date   • Acquired hypothyroidism 2018   • Allergic rhinitis    • Asthma     last asthma attack 2-3 month ago, SEASONAL ALLERGIES   • Chest pain     RELATED TO GALLBLADDER PER PT   • Constipation    • Cough    • Diabetes mellitus    • Hyperlipidemia    • Hypertension    • Malaise and fatigue    • Menopause, premature    • PONV (postoperative nausea and vomiting)    • RLS (restless legs syndrome)    • Swelling, limb        Family History   Problem Relation Age of Onset   • Family history unknown: Yes       Social History     Social History   • Marital status:      Spouse name: N/A   • Number of children: N/A   • Years of education: N/A     Occupational History   • Not on file.     Social History Main Topics   • Smoking status: Former Smoker     Packs/day: 0.50     Years: 25.00     Types: Cigarettes   • Smokeless tobacco: Former User     Quit date: 2008      Comment: 25-30 YEARS   • Alcohol use No   • Drug use: No   • Sexual activity: Defer     Other Topics Concern   • Not on file     Social History Narrative   • No narrative on file       Past Surgical History:   Procedure Laterality Date   •  SECTION     • HYSTERECTOMY     • NECK SURGERY      C6-7 ACDF   • OVARY SURGERY     • [place] LAP,CHOLECYSTECTOMY N/A 2016    Procedure: CHOLECYSTECTOMY LAPAROSCOPIC;  Surgeon: Don Tovar MD;  Location: [facility];  Service: General       The following portions of the patient's history were reviewed and updated as appropriate: allergies, current medications, past family history, past medical  "history, past social history, past surgical history and problem list.    Review of Systems   Constitutional: Negative for activity change, appetite change, chills and fever.   HENT: Negative for sore throat and trouble swallowing.    Eyes: Negative for visual disturbance.   Respiratory: Negative for cough and shortness of breath.    Cardiovascular: Negative for chest pain and palpitations.   Gastrointestinal: Positive for abdominal pain and constipation. Negative for abdominal distention, blood in stool, diarrhea, nausea and vomiting.   Endocrine: Negative for cold intolerance and heat intolerance.   Genitourinary: Negative for dysuria.   Musculoskeletal: Negative for joint swelling.   Skin: Negative for color change, rash and wound.   Allergic/Immunologic: Negative for immunocompromised state.   Neurological: Negative for dizziness, seizures, weakness and headaches.   Hematological: Negative for adenopathy. Does not bruise/bleed easily.   Psychiatric/Behavioral: Negative for agitation and confusion.         /81   Pulse 85   Ht 152.4 cm (60\")   Wt 59 kg (130 lb)   LMP 07/08/1986 (Within Months)   BMI 25.39 kg/m²   Objective   Physical Exam   Constitutional: She is oriented to person, place, and time. She appears well-developed.   HENT:   Head: Normocephalic and atraumatic.   Mouth/Throat: Mucous membranes are normal.   Eyes: Conjunctivae are normal. Pupils are equal, round, and reactive to light.   Neck: Neck supple. No JVD present. No tracheal deviation present. No thyromegaly present.   Cardiovascular: Normal rate and regular rhythm.  Exam reveals no gallop and no friction rub.    No murmur heard.  Pulmonary/Chest: Effort normal and breath sounds normal.   Abdominal: Soft. She exhibits no distension. There is no splenomegaly or hepatomegaly. There is no tenderness. No hernia.   Musculoskeletal: Normal range of motion. She exhibits no deformity.   Neurological: She is alert and oriented to person, place, " and time.   Skin: Skin is warm and dry.   Psychiatric: She has a normal mood and affect.         Tyler was seen today for egd consult.    Diagnoses and all orders for this visit:    Gastroesophageal reflux disease with esophagitis  -     Case Request; Standing  -     Case Request    Other orders  -     Follow Anesthesia Guidelines / Standing Orders; Future  -     Provide instructions to patient on NPO status  -     Follow Anesthesia Guidelines / Standing Orders; Standing  -     Verify NPO Status; Standing  -     Obtain informed consent; Standing        Assessment     71 yo F with epigastric pain that last several weeks intermittently.  No improvement despite medical PPI therapy.  She understands the risks and benefits and will undergo EGD 3/22/18.    Discussed the patient's BMI with her. BMI is within normal parameters. No follow-up required.

## 2018-03-22 ENCOUNTER — ANESTHESIA (OUTPATIENT)
Dept: PERIOP | Facility: HOSPITAL | Age: 73
End: 2018-03-22

## 2018-03-22 ENCOUNTER — HOSPITAL ENCOUNTER (OUTPATIENT)
Facility: HOSPITAL | Age: 73
Setting detail: HOSPITAL OUTPATIENT SURGERY
Discharge: HOME | End: 2018-03-22
Attending: SURGERY | Admitting: SURGERY

## 2018-03-22 ENCOUNTER — ANESTHESIA EVENT (OUTPATIENT)
Dept: PERIOP | Facility: HOSPITAL | Age: 73
End: 2018-03-22

## 2018-03-22 VITALS
BODY MASS INDEX: 24.84 KG/M2 | HEART RATE: 68 BPM | TEMPERATURE: 97.2 F | RESPIRATION RATE: 20 BRPM | HEIGHT: 62 IN | WEIGHT: 135 LBS | OXYGEN SATURATION: 95 % | DIASTOLIC BLOOD PRESSURE: 71 MMHG | SYSTOLIC BLOOD PRESSURE: 128 MMHG

## 2018-03-22 DIAGNOSIS — K21.00 GASTROESOPHAGEAL REFLUX DISEASE WITH ESOPHAGITIS: ICD-10-CM

## 2018-03-22 PROCEDURE — 88305 TISSUE EXAM BY PATHOLOGIST: CPT | Performed by: SURGERY

## 2018-03-22 PROCEDURE — 25010000002 PROPOFOL 1000 MG/ML EMULSION: Performed by: NURSE ANESTHETIST, CERTIFIED REGISTERED

## 2018-03-22 PROCEDURE — 88342 IMHCHEM/IMCYTCHM 1ST ANTB: CPT | Performed by: SURGERY

## 2018-03-22 PROCEDURE — 25010000002 PROPOFOL 10 MG/ML EMULSION: Performed by: NURSE ANESTHETIST, CERTIFIED REGISTERED

## 2018-03-22 PROCEDURE — 43239 EGD BIOPSY SINGLE/MULTIPLE: CPT | Performed by: SURGERY

## 2018-03-22 RX ORDER — PANTOPRAZOLE SODIUM 40 MG/1
40 TABLET, DELAYED RELEASE ORAL DAILY
Qty: 30 TABLET | Refills: 2 | Status: SHIPPED | OUTPATIENT
Start: 2018-03-22 | End: 2018-04-17

## 2018-03-22 RX ORDER — ONDANSETRON 2 MG/ML
4 INJECTION INTRAMUSCULAR; INTRAVENOUS ONCE AS NEEDED
Status: DISCONTINUED | OUTPATIENT
Start: 2018-03-22 | End: 2018-03-22 | Stop reason: HOSPADM

## 2018-03-22 RX ORDER — SODIUM CHLORIDE 0.9 % (FLUSH) 0.9 %
1-10 SYRINGE (ML) INJECTION AS NEEDED
Status: DISCONTINUED | OUTPATIENT
Start: 2018-03-22 | End: 2018-03-22 | Stop reason: HOSPADM

## 2018-03-22 RX ORDER — MEPERIDINE HYDROCHLORIDE 50 MG/ML
12.5 INJECTION INTRAMUSCULAR; INTRAVENOUS; SUBCUTANEOUS
Status: DISCONTINUED | OUTPATIENT
Start: 2018-03-22 | End: 2018-03-22 | Stop reason: HOSPADM

## 2018-03-22 RX ORDER — SODIUM CHLORIDE, SODIUM LACTATE, POTASSIUM CHLORIDE, CALCIUM CHLORIDE 600; 310; 30; 20 MG/100ML; MG/100ML; MG/100ML; MG/100ML
125 INJECTION, SOLUTION INTRAVENOUS CONTINUOUS
Status: DISCONTINUED | OUTPATIENT
Start: 2018-03-22 | End: 2018-03-22 | Stop reason: HOSPADM

## 2018-03-22 RX ORDER — FENTANYL CITRATE 50 UG/ML
50 INJECTION, SOLUTION INTRAMUSCULAR; INTRAVENOUS
Status: DISCONTINUED | OUTPATIENT
Start: 2018-03-22 | End: 2018-03-22 | Stop reason: HOSPADM

## 2018-03-22 RX ORDER — OXYCODONE HYDROCHLORIDE AND ACETAMINOPHEN 5; 325 MG/1; MG/1
1 TABLET ORAL ONCE AS NEEDED
Status: DISCONTINUED | OUTPATIENT
Start: 2018-03-22 | End: 2018-03-22 | Stop reason: HOSPADM

## 2018-03-22 RX ORDER — PROPOFOL 10 MG/ML
VIAL (ML) INTRAVENOUS AS NEEDED
Status: DISCONTINUED | OUTPATIENT
Start: 2018-03-22 | End: 2018-03-22 | Stop reason: SURG

## 2018-03-22 RX ORDER — IPRATROPIUM BROMIDE AND ALBUTEROL SULFATE 2.5; .5 MG/3ML; MG/3ML
3 SOLUTION RESPIRATORY (INHALATION) ONCE AS NEEDED
Status: DISCONTINUED | OUTPATIENT
Start: 2018-03-22 | End: 2018-03-22 | Stop reason: HOSPADM

## 2018-03-22 RX ADMIN — PROPOFOL 160 MCG/KG/MIN: 10 INJECTION, EMULSION INTRAVENOUS at 07:40

## 2018-03-22 RX ADMIN — PROPOFOL 40 MG: 10 INJECTION, EMULSION INTRAVENOUS at 07:40

## 2018-03-22 NOTE — ANESTHESIA PREPROCEDURE EVALUATION
Anesthesia Evaluation     history of anesthetic complications: PONV  NPO Solid Status: > 8 hours  NPO Liquid Status: > 8 hours           Airway   Mallampati: III  TM distance: <3 FB  Neck ROM: full  Dental - normal exam     Pulmonary - normal exam   (+) COPD, asthma,   Cardiovascular - normal exam    (+) hypertension, hyperlipidemia,       Neuro/Psych- negative ROS  GI/Hepatic/Renal/Endo    (+)   hypothyroidism,   (-) diabetes    Musculoskeletal (-) negative ROS    Abdominal  - normal exam   Substance History - negative use     OB/GYN negative ob/gyn ROS         Other - negative ROS                         Anesthesia Plan    ASA 2     general     intravenous induction   Anesthetic plan and risks discussed with patient.

## 2018-03-22 NOTE — OP NOTE
ESOPHAGOGASTRODUODENOSCOPY  Procedure Note    Tyler Cabello  3/22/2018    Pre-op Diagnosis:   Gastroesophageal reflux disease with esophagitis [K21.0]    Post-op Diagnosis:   GERD without esophagitis, mild gastritis    Indications: persistent epigastric pain    Procedure(s):  ESOPHAGOGASTRODUODENOSCOPY WITH ANESTHESIA    Surgeon(s):  Don Tovar MD    Anesthesia: Choice    Staff:   Circulator: Treasure Corley RN  Endo Technician: Jhony Tierney    Findings: mild gastritis with inflammation but no ulceration around the pylorus.    Operative Procedure: The patient was taken operating suite and placed in left lateral decubitus position.  Bilateral sequential compression devices were in place and IV anesthesia was administered.  Timeout procedure was performed.  The endoscope was inserted into the oropharynx and the esophagus was intubated.  The scope was advanced to the third portion of the duodenum.  The scope was slowly withdrawn evaluating all mucosal areas.  Patient had mild gastritis with an area of inflamation without ulceration of the pylorus at 5 o'clock.  Antral and pyloric biopsies were obtained.  Retroflexion showed a sliding hiatal hernia that was very small.  There were no masses and the GE junction showed no esophagitis or concerning findings..  The remainder of the esophageal mucosa was within normal limits and the scope was removed and the patient awakened from anesthesia and taken to recovery.    Estimated Blood Loss: none    Specimens:   Antral biopsy                 Drains: none    Grafts or Implants: none    Complications: none    Recommendations: continue PPI therapy, follow up in 2 weeks    Don Tovar MD     Date: 3/22/2018  Time: 7:50 AM

## 2018-03-22 NOTE — ANESTHESIA POSTPROCEDURE EVALUATION
Patient: Tyler Cabello    Procedure Summary     Date:  03/22/18 Room / Location:  [facility] OR [address] [place] [address] COR OR    Anesthesia Start:  0737 Anesthesia Stop:  0749    Procedure:  ESOPHAGOGASTRODUODENOSCOPY WITH ANESTHESIA (N/A Esophagus) Diagnosis:       Gastroesophageal reflux disease with esophagitis      (Gastroesophageal reflux disease with esophagitis [K21.0])    Surgeon:  Don Tovar MD Provider:  Mendoza Jean MD    Anesthesia Type:  general ASA Status:  2          Anesthesia Type: general  Last vitals  BP   128/71 (03/22/18 0815)   Temp   97.2 °F (36.2 °C) (03/22/18 0750)   Pulse   68 (03/22/18 0815)   Resp   20 (03/22/18 0815)     SpO2   95 % (03/22/18 0815)     Post Anesthesia Care and Evaluation    Patient location during evaluation: PHASE II  Patient participation: complete - patient participated  Level of consciousness: awake and alert  Pain score: 1  Pain management: adequate  Airway patency: patent  Anesthetic complications: No anesthetic complications  PONV Status: controlled  Cardiovascular status: acceptable  Respiratory status: acceptable  Hydration status: acceptable

## 2018-03-26 LAB
LAB AP CASE REPORT: NORMAL
Lab: NORMAL
PATH REPORT.FINAL DX SPEC: NORMAL

## 2018-04-10 ENCOUNTER — OFFICE VISIT (OUTPATIENT)
Dept: SURGERY | Facility: CLINIC | Age: 73
End: 2018-04-10

## 2018-04-10 VITALS
WEIGHT: 135 LBS | DIASTOLIC BLOOD PRESSURE: 70 MMHG | HEIGHT: 62 IN | BODY MASS INDEX: 24.84 KG/M2 | SYSTOLIC BLOOD PRESSURE: 109 MMHG | HEART RATE: 65 BPM

## 2018-04-10 DIAGNOSIS — K21.00 GASTROESOPHAGEAL REFLUX DISEASE WITH ESOPHAGITIS: Primary | ICD-10-CM

## 2018-04-10 PROCEDURE — 99212 OFFICE O/P EST SF 10 MIN: CPT | Performed by: SURGERY

## 2018-04-11 NOTE — PROGRESS NOTES
Subjective   Tyler Cabello is a 72 y.o. female  is here today for follow-up.         Tyler Cabello is a 72 y.o. female here for follow up for epigastric pain.  EGD shows esophagitis and mild gastritis without evidence of Sinclair's esophagus.  The patient is on daily Protonix with some improvement.  She'll continue for tonics for 1 month.    Physical Examination:  Abdomen soft nontender nondistended  No acute distress          Tyler was seen today for post-op egd.    Diagnoses and all orders for this visit:    Gastroesophageal reflux disease with esophagitis        Assessment     Tyler Cabello is a 72 y.o. female with epigastric pain likely secondary to reflux esophagitis.  The patient will continue Protonix therapy and follow-up in 1 month.  If symptoms persist we will increase to twice daily dosing.  She's been informed of dietary modification to help relieve symptoms as well.

## 2018-04-17 ENCOUNTER — OFFICE VISIT (OUTPATIENT)
Dept: FAMILY MEDICINE CLINIC | Facility: CLINIC | Age: 73
End: 2018-04-17

## 2018-04-17 VITALS
HEIGHT: 62 IN | BODY MASS INDEX: 25.03 KG/M2 | SYSTOLIC BLOOD PRESSURE: 130 MMHG | DIASTOLIC BLOOD PRESSURE: 80 MMHG | OXYGEN SATURATION: 97 % | TEMPERATURE: 98.5 F | HEART RATE: 69 BPM | WEIGHT: 136 LBS

## 2018-04-17 DIAGNOSIS — R23.8 DRY SCALP: ICD-10-CM

## 2018-04-17 DIAGNOSIS — K59.00 CONSTIPATION, UNSPECIFIED CONSTIPATION TYPE: ICD-10-CM

## 2018-04-17 DIAGNOSIS — K21.00 GASTROESOPHAGEAL REFLUX DISEASE WITH ESOPHAGITIS: Primary | ICD-10-CM

## 2018-04-17 DIAGNOSIS — E03.9 ACQUIRED HYPOTHYROIDISM: ICD-10-CM

## 2018-04-17 PROCEDURE — 99214 OFFICE O/P EST MOD 30 MIN: CPT | Performed by: NURSE PRACTITIONER

## 2018-04-17 RX ORDER — CALCIPOTRIENE 0.05 MG/ML
1 SOLUTION TOPICAL 2 TIMES DAILY
Qty: 60 ML | Refills: 5 | Status: SHIPPED | OUTPATIENT
Start: 2018-04-17 | End: 2018-05-04

## 2018-04-17 RX ORDER — PANTOPRAZOLE SODIUM 40 MG/1
40 TABLET, DELAYED RELEASE ORAL DAILY
Qty: 30 TABLET | Refills: 2 | Status: SHIPPED | OUTPATIENT
Start: 2018-04-17 | End: 2018-08-29 | Stop reason: SDUPTHER

## 2018-04-17 RX ORDER — LEVOTHYROXINE AND LIOTHYRONINE 9.5; 2.25 UG/1; UG/1
15 TABLET ORAL DAILY
Qty: 30 TABLET | Refills: 5 | Status: SHIPPED | OUTPATIENT
Start: 2018-04-17 | End: 2018-08-29 | Stop reason: SDUPTHER

## 2018-04-17 RX ORDER — HYDROCODONE BITARTRATE AND ACETAMINOPHEN 7.5; 325 MG/1; MG/1
1 TABLET ORAL EVERY 6 HOURS PRN
Qty: 16 TABLET | Refills: 0 | Status: SHIPPED | OUTPATIENT
Start: 2018-04-17 | End: 2018-07-25 | Stop reason: SDUPTHER

## 2018-04-17 RX ORDER — AZITHROMYCIN 250 MG/1
TABLET, FILM COATED ORAL
Qty: 6 TABLET | Refills: 0 | Status: SHIPPED | OUTPATIENT
Start: 2018-04-17 | End: 2018-05-02

## 2018-04-17 RX ORDER — TRAMADOL HYDROCHLORIDE 100 MG/1
100 TABLET, EXTENDED RELEASE ORAL DAILY
Qty: 30 TABLET | Refills: 2 | Status: SHIPPED | OUTPATIENT
Start: 2018-04-17 | End: 2018-07-25 | Stop reason: SDUPTHER

## 2018-04-17 RX ORDER — CALCIPOTRIENE 0.05 MG/ML
3-5 SOLUTION TOPICAL NIGHTLY
Qty: 60 ML | Refills: 5 | Status: SHIPPED | OUTPATIENT
Start: 2018-04-17 | End: 2018-04-17

## 2018-04-17 NOTE — PROGRESS NOTES
Cathryn Cabello is a 72 y.o. female.     Chief Complaint   Patient presents with   • Follow-up     egd results    History of Present Illness     Follow up on recent EGD- started taking Protonix , no other ppi or H2 blocker. No improvement with gastritis yet. Pt reports she was told she has a small Hiatal hernia.she is avoiding large meals.     Hypothyroidism - not tolerating synthroid due to abdominal pain.     Rash on scalp - needs calcipotriene susp prescription.     Sliding hiatal hernia-new diagnosis    Chronic osteoarthritis pain - rates her pain as 6 on a pain scale rating of 0-10.  Pain is located in her neck and low back and hands.  Her pain has been present for many years.  She has had spinal surgery in the past.  She has been seen by ortho and neurology in the past.  She does at times take ibuprofen for mild pain relief.  Her pain is described as stiffness, aching, and sharp at times.  Pain is worse with overuse of associated joints.  Rest and heat help only minimally.  Her pain does at times prevent her from doing the things she enjoys such as sewing, yardwork and playing with her granddaughter.  She reports that Ultram does ease her pain and stiffness, allowing her to better in enjoy her life.At times her pain is more severe and she must take Norco.   She has no history of substance abuse or addiction.    Pt has recently been in physical therapy.        Patient has been taking some ibuprofen at times which does seem to irritate her stomach as well.    Constipation - improved with current supplements and medication .       The following portions of the patient's history were reviewed and updated as appropriate: allergies, current medications, past family history, past medical history, past social history, past surgical history and problem list.    Review of Systems   Constitutional: Negative.  Negative for appetite change, fatigue and unexpected weight change.   HENT: Negative.  Negative for  "congestion, ear pain, nosebleeds, postnasal drip, rhinorrhea, sore throat, trouble swallowing and voice change.    Eyes: Positive for visual disturbance. Negative for pain.   Respiratory: Negative.  Negative for cough, shortness of breath and wheezing.    Cardiovascular: Negative.  Negative for chest pain and palpitations.   Gastrointestinal: Positive for abdominal pain and nausea. Negative for blood in stool, constipation, diarrhea and vomiting.   Endocrine: Negative.  Negative for cold intolerance and polydipsia.   Genitourinary: Negative.  Negative for difficulty urinating, flank pain and hematuria.   Musculoskeletal: Positive for arthralgias, back pain and neck pain. Negative for gait problem, joint swelling, myalgias ( right shoulder ) and neck stiffness.   Skin: Negative.  Negative for color change and rash.   Allergic/Immunologic: Negative.    Neurological: Negative for syncope, numbness and headaches.   Hematological: Negative.    Psychiatric/Behavioral: Negative for dysphoric mood, sleep disturbance and suicidal ideas. The patient is not nervous/anxious.    All other systems reviewed and are negative.      Objective     /80   Pulse 69   Temp 98.5 °F (36.9 °C) (Tympanic)   Ht 157.5 cm (62.01\")   Wt 61.7 kg (136 lb)   LMP 07/08/1986 (Within Months)   SpO2 97%   BMI 24.87 kg/m²   Admission on 03/22/2018, Discharged on 03/22/2018   Component Date Value Ref Range Status   • Case Report 03/26/2018    Final                    Value:Surgical Pathology Report                         Case: [id number]                                  Authorizing Provider:  Don Tovar MD    Collected:           03/22/2018 07:46 AM          Ordering Location:     [facility]      Received:            03/22/2018 10:09 AM                                 OPERATING ROOM DEPARTMENT                                                    Pathologist:           Linda Snyder MD                                   "                      Specimen:    Small Intestine, pyloric bx                                                               • Final Diagnosis 03/26/2018    Final                    Value:This result contains rich text formatting which cannot be displayed here.       Physical Exam   Constitutional: She is oriented to person, place, and time. Vital signs are normal. She appears well-developed and well-nourished. She does not appear ill. No distress.   Very pleasant 72-year-old female.   HENT:   Head: Normocephalic.   Right Ear: External ear normal.   Left Ear: External ear normal.   Nose: Nose normal.   Mouth/Throat: Oropharynx is clear and moist.   Eyes: Pupils are equal, round, and reactive to light.   Neck: Normal range of motion. Neck supple. No tracheal deviation present. No thyromegaly present.   Cardiovascular: Normal rate, regular rhythm and normal heart sounds.  Exam reveals no gallop and no friction rub.    No murmur heard.  Pulmonary/Chest: Effort normal and breath sounds normal. No respiratory distress. She has no wheezes. She has no rales. She exhibits no tenderness.   Abdominal: Soft. Normal appearance and bowel sounds are normal. She exhibits no distension and no mass. There is tenderness in the epigastric area. There is no rebound and no guarding.   Musculoskeletal:        Cervical back: She exhibits tenderness.        Lumbar back: She exhibits decreased range of motion and tenderness.   General stiffness in both knees    Neurological: She is alert and oriented to person, place, and time. She has normal reflexes.   CN 2-12 grossly intact    Skin: Skin is warm and dry. No rash noted. No erythema.   Psychiatric: She has a normal mood and affect. Her speech is normal and behavior is normal. Judgment and thought content normal. Cognition and memory are normal.       Assessment/Plan     Problem List Items Addressed This Visit        Digestive    Gastroesophageal reflux disease with esophagitis - Primary     Relevant Medications    pantoprazole (PROTONIX) 40 MG EC tablet    thyroid (ARMOUR THYROID) 15 MG tablet    Constipation       Endocrine    Acquired hypothyroidism    Relevant Medications    pantoprazole (PROTONIX) 40 MG EC tablet    thyroid (ARMOUR THYROID) 15 MG tablet       Other    Dry scalp    Overview     New diagnosis           Other Visit Diagnoses    None.         Reviewed recent EGD consult notes. Continue Protonix. Keep follow up with Dr. Tovar 5/2/18. Stop Motrin due to stomach irritation.     Stop synthroid. Start [place] thyroid 15 mg daily.  Stop Ibuprofen. Continue Ultram daily # 30 with 2 refills.  Continue Norco 7.5 mg every six hours prn for break through pain.      Proper body mechanics has been reviewed and discussed today.      I have discussed diagnosis in detail today allowing time for questions and answers. Pt is aware of reasons to seek urgent or emergent medical care as well as reasons to return to the clinic for evaluation. Possible side effects, interactions and progression of symptoms discussed as well. Pt / family states understanding.   Emotional support and active listening provided.   Diaz has been reviewed as consistent.  Uds is on file.   Goal: Improved quality of life and reduction in pain as evidenced by pt report.   Patient has been instructed and counseled regarding opioid misuse and risk of addiction.  We have discussed proper storage and disposal of controlled medication.  Follow up in 3 months. Routine labs fasting one week prior to next office visit. Return sooner if needed.       Errors in dictation may reflect use of voice recognition software and not all errors in transcription may have been detected prior to signing.       This document has been electronically signed by:  ZHOU Moncada, NP-C

## 2018-05-02 ENCOUNTER — OFFICE VISIT (OUTPATIENT)
Dept: FAMILY MEDICINE CLINIC | Facility: CLINIC | Age: 73
End: 2018-05-02

## 2018-05-02 VITALS
BODY MASS INDEX: 25.03 KG/M2 | TEMPERATURE: 99 F | DIASTOLIC BLOOD PRESSURE: 80 MMHG | HEIGHT: 62 IN | SYSTOLIC BLOOD PRESSURE: 130 MMHG | OXYGEN SATURATION: 98 % | WEIGHT: 136 LBS | HEART RATE: 75 BPM

## 2018-05-02 DIAGNOSIS — E55.9 VITAMIN D DEFICIENCY: ICD-10-CM

## 2018-05-02 DIAGNOSIS — K21.00 GASTROESOPHAGEAL REFLUX DISEASE WITH ESOPHAGITIS: ICD-10-CM

## 2018-05-02 DIAGNOSIS — M25.561 CHRONIC PAIN OF BOTH KNEES: ICD-10-CM

## 2018-05-02 DIAGNOSIS — E11.9 TYPE 2 DIABETES MELLITUS WITHOUT COMPLICATION, WITHOUT LONG-TERM CURRENT USE OF INSULIN (HCC): Primary | ICD-10-CM

## 2018-05-02 DIAGNOSIS — M25.562 CHRONIC PAIN OF BOTH KNEES: ICD-10-CM

## 2018-05-02 DIAGNOSIS — J45.42 MODERATE PERSISTENT ASTHMA WITH STATUS ASTHMATICUS: ICD-10-CM

## 2018-05-02 DIAGNOSIS — Z11.59 ENCOUNTER FOR HEPATITIS C SCREENING TEST FOR LOW RISK PATIENT: ICD-10-CM

## 2018-05-02 DIAGNOSIS — E03.9 ACQUIRED HYPOTHYROIDISM: ICD-10-CM

## 2018-05-02 DIAGNOSIS — E78.2 MIXED HYPERLIPIDEMIA: ICD-10-CM

## 2018-05-02 DIAGNOSIS — I10 ESSENTIAL HYPERTENSION: ICD-10-CM

## 2018-05-02 DIAGNOSIS — G25.81 RLS (RESTLESS LEGS SYNDROME): ICD-10-CM

## 2018-05-02 DIAGNOSIS — Z23 ENCOUNTER FOR IMMUNIZATION: ICD-10-CM

## 2018-05-02 DIAGNOSIS — Z12.39 SCREENING FOR BREAST CANCER: ICD-10-CM

## 2018-05-02 DIAGNOSIS — G89.29 CHRONIC PAIN OF BOTH KNEES: ICD-10-CM

## 2018-05-02 PROCEDURE — G0439 PPPS, SUBSEQ VISIT: HCPCS | Performed by: NURSE PRACTITIONER

## 2018-05-02 PROCEDURE — 90715 TDAP VACCINE 7 YRS/> IM: CPT | Performed by: NURSE PRACTITIONER

## 2018-05-02 PROCEDURE — 90471 IMMUNIZATION ADMIN: CPT | Performed by: NURSE PRACTITIONER

## 2018-05-02 NOTE — PROGRESS NOTES
QUICK REFERENCE INFORMATION:  The ABCs of the Annual Wellness Visit    Subsequent Medicare Wellness Visit    HEALTH RISK ASSESSMENT    1945    Recent Hospitalizations:  No hospitalization(s) within the last year..        Current Medical Providers:  Patient Care Team:  ZHOU Villar as PCP - General  ZHOU Villar as PCP - Family Medicine        Smoking Status:  History   Smoking Status   • Former Smoker   • Packs/day: 0.50   • Years: 25.00   • Types: Cigarettes   Smokeless Tobacco   • Former User   • Quit date: 7/6/2008     Comment: 25-30 YEARS       Alcohol Consumption:  History   Alcohol Use No       Depression Screen:   PHQ-2/PHQ-9 Depression Screening 5/2/2018   Little interest or pleasure in doing things 0   Feeling down, depressed, or hopeless 0   Trouble falling or staying asleep, or sleeping too much 1   Feeling tired or having little energy 3   Poor appetite or overeating 0   Feeling bad about yourself - or that you are a failure or have let yourself or your family down 3   Trouble concentrating on things, such as reading the newspaper or watching television 1   Moving or speaking so slowly that other people could have noticed. Or the opposite - being so fidgety or restless that you have been moving around a lot more than usual 0   Thoughts that you would be better off dead, or of hurting yourself in some way 1   Total Score 9       Health Habits and Functional and Cognitive Screening:  Functional & Cognitive Status 5/2/2018   Do you have difficulty preparing food and eating? No   Do you have difficulty bathing yourself, getting dressed or grooming yourself? No   Do you have difficulty using the toilet? No   Do you have difficulty moving around from place to place? Yes   Do you have trouble with steps or getting out of a bed or a chair? Yes   In the past year have you fallen or experienced a near fall? Yes   Current Diet Well Balanced Diet   Dental Exam Not up to  date   Eye Exam Not up to date   Exercise (times per week) 7 times per week   Current Exercise Activities Include Walking   Do you need help using the phone?  Yes   Are you deaf or do you have serious difficulty hearing?  No   Do you need help with transportation? No   Do you need help shopping? No   Do you need help preparing meals?  Yes   Do you need help with housework?  Yes   Do you need help with laundry? No   Do you need help taking your medications? No   Do you need help managing money? No   Do you ever drive or ride in a car without wearing a seat belt? No   Have you felt unusual stress, anger or loneliness in the last month? No   Who do you live with? Spouse   If you need help, do you have trouble finding someone available to you? No   Have you been bothered in the last four weeks by sexual problems? No   Do you have difficulty concentrating, remembering or making decisions? Yes           Does the patient have evidence of cognitive impairment? No    Aspirin use counseling: Does not need ASA (and currently is not on it)      Recent Lab Results:  CMP:  Lab Results   Component Value Date    BUN 14 02/13/2018    CREATININE 0.92 02/13/2018    EGFRIFNONA 60 (L) 02/13/2018    BCR 15.2 02/13/2018     02/13/2018    K 4.2 02/13/2018    CO2 29.0 02/13/2018    CALCIUM 9.4 02/13/2018    ALBUMIN 4.60 02/13/2018    LABIL2 1.9 02/13/2018    BILITOT 0.4 02/13/2018    ALKPHOS 55 02/13/2018    AST 28 02/13/2018    ALT 45 (H) 02/13/2018     Lipid Panel:  Lab Results   Component Value Date    CHOL 159 02/13/2018    TRIG 108 02/13/2018    HDL 49 (L) 02/13/2018    VLDL 21.6 02/13/2018    LDLHDL 1.80 02/13/2018     HbA1c:  Lab Results   Component Value Date    HGBA1C 6.10 (H) 02/13/2018       Visual Acuity:   Visual Acuity Screening    Right eye Left eye Both eyes   Without correction:      With correction: 20/30 20/50 20/30     Hearing adequate bilateral ears with whisper test     Age-appropriate Screening Schedule:  Refer  to the list below for future screening recommendations based on patient's age, sex and/or medical conditions. Orders for these recommended tests are listed in the plan section. The patient has been provided with a written plan.    Health Maintenance   Topic Date Due   • TDAP/TD VACCINES (1 - Tdap) 05/23/1965   • PNEUMOCOCCAL VACCINES (65+ LOW/MEDIUM RISK) (1 of 2 - PCV13) 05/23/2011   • INFLUENZA VACCINE  08/01/2018   • HEMOGLOBIN A1C  08/13/2018   • LIPID PANEL  02/13/2019   • URINE MICROALBUMIN  02/13/2019   • DIABETIC FOOT EXAM  05/02/2019   • DIABETIC EYE EXAM  05/02/2019   • MAMMOGRAM  05/02/2020   • COLONOSCOPY  10/14/2023   • ZOSTER VACCINE  Completed        Subjective   History of Present Illness    Tyler Cabello is a 72 y.o. female who presents for an Subsequent Wellness Visit.    Chronic back and knee pain-unchanged    Hyperlipidemia-working on diet, taking crestor     GERD-stable    Hypothyroidism-stable  Asthma-stable    Diabetic, type 2 - Patient is managing her diabetes with lifestyle changes and diet.    Need for vaccination  Restless leg syndrome-stable  Patient reports she had EGD and colonoscopy in 2013 at [facility] we'll request records    The following portions of the patient's history were reviewed and updated as appropriate: allergies, current medications, past family history, past medical history, past social history, past surgical history and problem list.    Outpatient Medications Prior to Visit   Medication Sig Dispense Refill   • baclofen (LIORESAL) 10 MG tablet Take 1 tablet by mouth Daily As Needed for Muscle Spasms. 90 tablet 3   • Calcipotriene 0.005 % ointment Apply 1 application topically 2 (Two) Times a Day. 60 mL 5   • cetirizine (zyrTEC) 10 MG tablet Take 1 tablet by mouth Daily. 90 tablet 3   • HYDROcodone-acetaminophen (NORCO) 7.5-325 MG per tablet Take 1 tablet by mouth Every 6 (Six) Hours As Needed for Moderate Pain . 16 tablet 0   • montelukast (SINGULAIR) 10 MG tablet  Take 1 tablet by mouth Every Night. 90 tablet 3   • pantoprazole (PROTONIX) 40 MG EC tablet Take 1 tablet by mouth Daily for 90 days. 30 tablet 2   • rOPINIRole (REQUIP) 2 MG tablet Take 1 tablet by mouth Every Night. Take 1 hour before bedtime. 90 tablet 3   • thyroid (ARMOUR THYROID) 15 MG tablet Take 1 tablet by mouth Daily. 30 tablet 5   • traMADol ER (ULTRAM-ER) 100 MG 24 hr tablet Take 1 tablet by mouth Daily. 30 tablet 2   • verapamil SR (CALAN-SR) 240 MG CR tablet Take 1 tablet by mouth Every Night. 90 tablet 3   • azithromycin (ZITHROMAX Z-KELLEE) 250 MG tablet Take 2 tablets the first day, then 1 tablet daily for 4 days. 6 tablet 0   • ibuprofen (ADVIL,MOTRIN) 800 MG tablet Take 1 tablet by mouth Every 8 (Eight) Hours As Needed for Mild Pain  or Fever. 90 tablet 3   • rosuvastatin (CRESTOR) 5 MG tablet Take 1 tablet by mouth Daily. 90 tablet 3     No facility-administered medications prior to visit.        Patient Active Problem List   Diagnosis   • Gastroesophageal reflux disease with esophagitis   • Moderate persistent asthma with status asthmaticus   • Constipation   • Mixed hyperlipidemia   • Essential hypertension   • RLS (restless legs syndrome)   • Type 2 diabetes mellitus without complication   • 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   • Dry scalp   • Acquired hypothyroidism   • Chronic pain of right knee   • Chronic pain of both knees   • Encounter for immunization   • Screening for breast cancer       Advance Care Planning:  has NO advance directive - not interested in additional information    Identification of Risk Factors:  Risk factors include: weight , cardiovascular risk and chronic pain.    Review of Systems   Constitutional: Negative for activity change, appetite change, chills, fatigue and fever.   HENT: Negative for ear pain, facial swelling, hearing loss, sinus pressure, sore throat, trouble swallowing and voice change.    Eyes: Negative for pain, discharge and visual disturbance.    Respiratory: Negative for apnea, cough, chest tightness, shortness of breath and wheezing.    Cardiovascular: Negative for chest pain, palpitations and leg swelling.   Gastrointestinal: Negative for abdominal pain, blood in stool, constipation, diarrhea, nausea and vomiting.   Genitourinary: Negative for dysuria.   Musculoskeletal: Positive for arthralgias and back pain. Negative for neck stiffness.   Skin: Negative for color change.   Allergic/Immunologic: Positive for environmental allergies.   Neurological: Negative for headaches.   Psychiatric/Behavioral: Negative for confusion, dysphoric mood and suicidal ideas. The patient is not nervous/anxious.        Compared to one year ago, the patient feels her physical health is better.  Compared to one year ago, the patient feels her mental health is better.    Objective     Physical Exam   Constitutional: She is oriented to person, place, and time. She appears well-developed and well-nourished.   HENT:   Head: Normocephalic.   Right Ear: External ear normal.   Left Ear: External ear normal.   Nose: Nose normal.   Mouth/Throat: Oropharynx is clear and moist.   Eyes: EOM are normal. Pupils are equal, round, and reactive to light.   Neck: Normal range of motion. Neck supple. No tracheal deviation present. No thyromegaly present.   Cardiovascular: Normal rate, regular rhythm and normal heart sounds.  Exam reveals no gallop and no friction rub.    No murmur heard.  Pulmonary/Chest: Effort normal and breath sounds normal. No respiratory distress. She has no wheezes. She has no rales. She exhibits no tenderness.   Abdominal: Soft. Bowel sounds are normal. She exhibits no distension and no mass. There is no tenderness. There is no rebound and no guarding.   Musculoskeletal:        Right knee: She exhibits normal range of motion.        Left knee: She exhibits normal range of motion.        Cervical back: She exhibits decreased range of motion and spasm.        Lumbar back:  "She exhibits decreased range of motion and spasm.    Tyler had a diabetic foot exam performed today.  Vascular Status -  Her right foot exhibits no edema. Her left foot exhibits no edema.  Skin Integrity  -  Her right foot skin is intact.Her left foot skin is intact..  Lymphadenopathy:     She has no cervical adenopathy.   Neurological: She is alert and oriented to person, place, and time. She has normal reflexes.   CN 2-12 grossly intact    Skin: Skin is warm and dry. Capillary refill takes less than 2 seconds. No rash noted. No erythema.   Psychiatric: She has a normal mood and affect. Her behavior is normal. Judgment and thought content normal. Cognition and memory are normal.       Vitals:    05/02/18 0851   BP: 130/80   Pulse: 75   Temp: 99 °F (37.2 °C)   TempSrc: Tympanic   SpO2: 98%   Weight: 61.7 kg (136 lb)   Height: 157.5 cm (62.01\")       Patient's Body mass index is 24.87 kg/m². BMI is above normal parameters. Follow-up plan includes:  exercise counseling and nutrition counseling.      Assessment/Plan   Patient Self-Management and Personalized Health Advice  The patient has been provided with information about: exercise, weight management, prevention of cardiac or vascular disease, the relationship between weight and GERD, fall prevention and supplements and preventive services including:   · Exercise counseling provided, Fall Risk assessment done, Fall Risk plan of care done, Glaucoma screening recommended, Influenza vaccine, Nutrition counseling provided, Pneumococcal vaccine , Screening mammography, referral placed, TdaP vaccine, Zostavax vaccine (Herpes Zoster).    Visit Diagnoses:    ICD-10-CM ICD-9-CM   1. Type 2 diabetes mellitus without complication, without long-term current use of insulin E11.9 250.00   2. Essential hypertension I10 401.9   3. Mixed hyperlipidemia E78.2 272.2   4. Moderate persistent asthma with status asthmaticus J45.42 493.91   5. Gastroesophageal reflux disease with " esophagitis K21.0 530.11   6. Acquired hypothyroidism E03.9 244.9   7. Chronic pain of both knees M25.561 719.46    M25.562 338.29    G89.29    8. RLS (restless legs syndrome) G25.81 333.94   9. Encounter for immunization Z23 V03.89   10. Screening for breast cancer Z12.31 V76.10   11. Encounter for hepatitis C screening test for low risk patient Z11.59 V73.89   12. Vitamin D deficiency E55.9 268.9       Orders Placed This Encounter   Procedures   • Mammo Screening Bilateral With CAD     Standing Status:   Future     Standing Expiration Date:   5/2/2019     Order Specific Question:   Reason for Exam:     Answer:   Breast Cancer Screening   • Tdap Vaccine Greater Than or Equal To 6yo IM   • Hepatitis C Antibody     Standing Status:   Future     Standing Expiration Date:   5/2/2019   • Comprehensive Metabolic Panel     Standing Status:   Future     Standing Expiration Date:   5/3/2019   • TSH     Standing Status:   Future     Standing Expiration Date:   5/2/2019   • Hemoglobin A1c     Standing Status:   Future     Standing Expiration Date:   5/2/2019   • Vitamin D 25 Hydroxy     Standing Status:   Future     Standing Expiration Date:   5/2/2019   • MicroAlbumin, Urine, Random - Urine, Clean Catch     Standing Status:   Future     Standing Expiration Date:   5/2/2019   • Lipid Panel     Standing Status:   Future     Standing Expiration Date:   5/2/2019   • Vitamin B12     Standing Status:   Future     Standing Expiration Date:   5/2/2019   • CBC & Differential     Standing Status:   Future     Standing Expiration Date:   5/2/2019     Order Specific Question:   Manual Differential     Answer:   No       Outpatient Encounter Prescriptions as of 5/2/2018   Medication Sig Dispense Refill   • baclofen (LIORESAL) 10 MG tablet Take 1 tablet by mouth Daily As Needed for Muscle Spasms. 90 tablet 3   • Calcipotriene 0.005 % ointment Apply 1 application topically 2 (Two) Times a Day. 60 mL 5   • cetirizine (zyrTEC) 10 MG tablet  Take 1 tablet by mouth Daily. 90 tablet 3   • HYDROcodone-acetaminophen (NORCO) 7.5-325 MG per tablet Take 1 tablet by mouth Every 6 (Six) Hours As Needed for Moderate Pain . 16 tablet 0   • montelukast (SINGULAIR) 10 MG tablet Take 1 tablet by mouth Every Night. 90 tablet 3   • pantoprazole (PROTONIX) 40 MG EC tablet Take 1 tablet by mouth Daily for 90 days. 30 tablet 2   • rOPINIRole (REQUIP) 2 MG tablet Take 1 tablet by mouth Every Night. Take 1 hour before bedtime. 90 tablet 3   • thyroid (ARMOUR THYROID) 15 MG tablet Take 1 tablet by mouth Daily. 30 tablet 5   • traMADol ER (ULTRAM-ER) 100 MG 24 hr tablet Take 1 tablet by mouth Daily. 30 tablet 2   • verapamil SR (CALAN-SR) 240 MG CR tablet Take 1 tablet by mouth Every Night. 90 tablet 3   • Zoster Vac Recomb Adjuvanted (SHINGRIX) 50 MCG reconstituted suspension Inject 0.5 mL into the shoulder, thigh, or buttocks 1 (One) Time for 1 dose. 1 each 1   • [DISCONTINUED] azithromycin (ZITHROMAX Z-KELLEE) 250 MG tablet Take 2 tablets the first day, then 1 tablet daily for 4 days. 6 tablet 0   • [DISCONTINUED] ibuprofen (ADVIL,MOTRIN) 800 MG tablet Take 1 tablet by mouth Every 8 (Eight) Hours As Needed for Mild Pain  or Fever. 90 tablet 3   • [DISCONTINUED] rosuvastatin (CRESTOR) 5 MG tablet Take 1 tablet by mouth Daily. 90 tablet 3   • [DISCONTINUED] Tdap (BOOSTRIX) 5-2.5-18.5 LF-MCG/0.5 injection Inject 0.5 mL into the shoulder, thigh, or buttocks 1 (One) Time for 1 dose. 0.5 mL 0     No facility-administered encounter medications on file as of 5/2/2018.      Patient's Body mass index is 24.87 kg/m². BMI is above normal parameters. Follow-up plan includes:  exercise counseling and nutrition counseling.  Emotional support and active listening provided.   Emotional support and active listening provided.   Pt has been educated/instructed on diabetic care and protocols.  Diabetic diet instructions provided.  Medication regimen reviewed.  Discussed possible side effects and  interactions of current medications.  Sick day rules reviewed. Continue to monitor blood sugar and report abnormal readings as discussed today. Understanding stated by patient.     Reviewed use of high risk medication in the elderly: yes  Reviewed for potential of harmful drug interactions in the elderly: yes    Follow Up:  Return for labs one week prior.   Scheduled for follow-up visit 07/17/2018      An After Visit Summary and PPPS with all of these plans were given to the patient.

## 2018-05-02 NOTE — PATIENT INSTRUCTIONS
Diabetes Mellitus and Food  It is important for you to manage your blood sugar (glucose) level. Your blood glucose level can be greatly affected by what you eat. Eating healthier foods in the appropriate amounts throughout the day at about the same time each day will help you control your blood glucose level. It can also help slow or prevent worsening of your diabetes mellitus. Healthy eating may even help you improve the level of your blood pressure and reach or maintain a healthy weight.  General recommendations for healthful eating and cooking habits include:  · Eating meals and snacks regularly. Avoid going long periods of time without eating to lose weight.  · Eating a diet that consists mainly of plant-based foods, such as fruits, vegetables, nuts, legumes, and whole grains.  · Using low-heat cooking methods, such as baking, instead of high-heat cooking methods, such as deep frying.  Work with your dietitian to make sure you understand how to use the Nutrition Facts information on food labels.  How can food affect me?  Carbohydrates   Carbohydrates affect your blood glucose level more than any other type of food. Your dietitian will help you determine how many carbohydrates to eat at each meal and teach you how to count carbohydrates. Counting carbohydrates is important to keep your blood glucose at a healthy level, especially if you are using insulin or taking certain medicines for diabetes mellitus.  Alcohol   Alcohol can cause sudden decreases in blood glucose (hypoglycemia), especially if you use insulin or take certain medicines for diabetes mellitus. Hypoglycemia can be a life-threatening condition. Symptoms of hypoglycemia (sleepiness, dizziness, and disorientation) are similar to symptoms of having too much alcohol.  If your health care provider has given you approval to drink alcohol, do so in moderation and use the following guidelines:  · Women should not have more than one drink per day, and men  should not have more than two drinks per day. One drink is equal to:  ¨ 12 oz of beer.  ¨ 5 oz of wine.  ¨ 1½ oz of hard liquor.  · Do not drink on an empty stomach.  · Keep yourself hydrated. Have water, diet soda, or unsweetened iced tea.  · Regular soda, juice, and other mixers might contain a lot of carbohydrates and should be counted.  What foods are not recommended?  As you make food choices, it is important to remember that all foods are not the same. Some foods have fewer nutrients per serving than other foods, even though they might have the same number of calories or carbohydrates. It is difficult to get your body what it needs when you eat foods with fewer nutrients. Examples of foods that you should avoid that are high in calories and carbohydrates but low in nutrients include:  · Trans fats (most processed foods list trans fats on the Nutrition Facts label).  · Regular soda.  · Juice.  · Candy.  · Sweets, such as cake, pie, doughnuts, and cookies.  · Fried foods.  What foods can I eat?  Eat nutrient-rich foods, which will nourish your body and keep you healthy. The food you should eat also will depend on several factors, including:  · The calories you need.  · The medicines you take.  · Your weight.  · Your blood glucose level.  · Your blood pressure level.  · Your cholesterol level.  You should eat a variety of foods, including:  · Protein.  ¨ Lean cuts of meat.  ¨ Proteins low in saturated fats, such as fish, egg whites, and beans. Avoid processed meats.  · Fruits and vegetables.  ¨ Fruits and vegetables that may help control blood glucose levels, such as apples, mangoes, and yams.  · Dairy products.  ¨ Choose fat-free or low-fat dairy products, such as milk, yogurt, and cheese.  · Grains, bread, pasta, and rice.  ¨ Choose whole grain products, such as multigrain bread, whole oats, and brown rice. These foods may help control blood pressure.  · Fats.  ¨ Foods containing healthful fats, such as nuts,  avocado, olive oil, canola oil, and fish.  Does everyone with diabetes mellitus have the same meal plan?  Because every person with diabetes mellitus is different, there is not one meal plan that works for everyone. It is very important that you meet with a dietitian who will help you create a meal plan that is just right for you.  This information is not intended to replace advice given to you by your health care provider. Make sure you discuss any questions you have with your health care provider.  Document Released: 09/14/2006 Document Revised: 05/25/2017 Document Reviewed: 11/14/2014  [employer] Interactive Patient Education © 2017 [employer].  ãÑÖ ÇáÓßÑí æÇáØÚÇã  (Diabetes Mellitus and Food)  ãä Çáãåã áß ÇáÓíØÑÉ Úáì ãÓÊæíÇÊ ÇáÓßÑ Ýí ÇáÏã (ÇáÌáæßæÒ). ÍíË íÊÃËÑ ãÓÊæì ÌáæßæÒ ÇáÏã ÈÏÑÌÉ ßÈíÑÉ ÈãÇ ÊÊäÇæáå ãä ØÚÇã. Åä ÊäÇæá ÃØÚãÉ ÕÍíÉ ÈßãíÇÊ ãäÇÓÈÉ Úáì ãÏÇÑ Çáíæã Ýí äÝÓ ÇáÊæÞíÊ ÊÞÑíÈÇð íæãíÇð ãä ÔÃäå ãÓÇÚÏÊß Úáì ÇáÊÍßã Ýí ãÓÊæì ÌáæßæÒ ÇáÏã. ßãÇ ÞÏ íÓÇÚÏ Úáì ÅÈØÇÁ Ãæ ãäÚ ÊÏåæÑ ãÑÖ ÇáÓßÑí áÏíß. íãßä Ãä íÓÇÚÏß ÊäÇæá ØÚÇã ÕÍí Úáì ÊÍÓíä ãÓÊæì ÖÛØ ÇáÏã æÇáæÕæá Åáì æÒä ÕÍí Ãæ ÇáÍÝÇÙ Úáíå.  æÊÔãá ÇáäÕÇÆÍ ÇáÚÇãÉ ááÍÝÇÙ Úáì ÚÇÏÇÊ ÛÐÇÆíÉ æØåæ ÕÍíÉ ãÇ íáí:  • ÊäÇæá æÌÈÇÊ ßÇãáÉ ææÌÈÇÊ ÎÝíÝÉ Úáì äÍæ ãäÊÙã. ÊÌäÈ ÇáÈÞÇÁ áãÏÉ ØæíáÉ Ïæä ÊäÇæá ØÚÇã ÈÛÑÖ ÝÞÏÇä ÇáæÒä.  • ÇáÇáÊÒÇã ÈäÙÇã ÛÐÇÆí íÊßæä ÈÕÝÉ ÑÆíÓíÉ ãä ÇáäÈÇÊÇÊ¡ ãËá ÇáÝÇßåÉ æÇáÎÖÑÇæÇÊ æÇáãßÓÑÇÊ æÇáÈÞæáíÇÊ æÇáÍÈæÈ ÇáßÇãáÉ.  • ÇÓÊÎÏÇã ÃÓÇáíÈ Øåí Úáì ÍÑÇÑÉ ãäÎÝÖÉ¡ ãËá ÇáÎÈÒ¡ ÈÏáÇð ãä ÇáØåí Úáì ÏÑÌÇÊ ÍÑÇÑÉ ãÑÊÝÚÉ¡ ãËá ÇáÞáí ÇáÚãíÞ.  ÇÚãá ãÚ ÃÎÕÇÆí ÊÛÐíÉ ááÊÃßÏ ãä Ýåãß áßíÝíÉ ÇÓÊÎÏÇã ãÚáæãÇÊ ÇáÍÞÇÆÞ ÇáÛÐÇÆíÉ ÇáãæÌæÏÉ Úáì ãáÕÞÇÊ ÇáãÃßæáÇÊ.  ßíÝ íãßä ááØÚÇã ÇáÊÃËíÑ Ýí¿  ÇáßÑÈæåíÏÑÇÊ  ÊÄËÑ ÇáßÑÈæåíÏÑÇÊ Úáì ãÓÊæì ÌáæßæÒ ÇáÏã ÃßËÑ ãä Ãí äæÚ ÂÎÑ ãä ÇáÃØÚãÉ. ÓíÓÇÚÏß ÃÎÕÇÆí ÇáÊÛÐíÉ Úáì ÊÍÏíÏ ßãíÉ ÇáßÑÈæåíÏÑÇÊ ÇáÊí íãßäß ÊäÇæáåÇ Ýí ßá æÌÈÉ æíÚáãß ßíÝíÉ ÇÍÊÓÇÈåÇ. ãä Çáãåã ÇÍÊÓÇÈ ßãíÉ ÇáßÑÈæåíÏÑÇÊ ááÍÝÇÙ Úáì ãÓÊæì ÕÍí áÌáæßæÒ ÇáÏã¡ æÚáì ÇáÃÎÕ ÅÐÇ ßäÊ ÊÊäÇæá ÇáÅäÓæáíä Ãæ ÃÏæíÉ ãÚíäÉ áÚáÇÌ  ÇáÓßÑí.  ÇáßÍæáíÇÊ  íãßä Ãä ÊÓÈÈ ÇáßÍæáíÇÊ ÇäÎÝÇÖðÇ ãÝÇÌÆðÇ Ýí ãÓÊæì ÌáæßæÒ ÇáÏã (äÞÕ ÓßÑ ÇáÏã)¡ æáÇ ÓíãÇ ÅÐÇ ßäÊ ÊÊäÇæá ÇáÅäÓæáíä Ãæ ÃÏæíÉ ãÚíäÉ áÚáÇÌ ÇáÓßÑí. ÞÏ íåÏÏ äÞÕ ÓßÑ ÇáÏã ÍíÇÊß. ãä ÃÚÑÇÖ äÞÕ ÓßÑ ÇáÏã (ÇáäÚÇÓ æÇáÏæÇÑ æÇáÊæåÇä) æåí ÔÈíåÉ ÈÃÚÑÇÖ ÇáÅÝÑÇØ Ýí ÊäÇæá ÇáßÍæáíÇÊ.  ÅÐÇ æÇÝÞ ãÞÏã ÇáÑÚÇíÉ Úáì ÊäÇæáß ÇáßÍæáíÇÊ¡ ÝÊäÇæáåÇ ÈÇÚÊÏÇá æÇÊÈÚ ÇáÊæÌíåÇÊ ÇáÊÇáíÉ:  • íÌÈ ÃáÇ ÊÊäÇæá ÇáÓíÏÇÊ ÃßËÑ ãä ãÔÑæÈ æÇÍÏ Ýí Çáíæã¡ ÈíäãÇ áÇ íÊÌÇæÒ ãÇ íÊäÇæáå ÇáÑÌÇá ãÔÑæÈíä Ýí Çáíæã. ãÔÑæÈ æÇÍÏ íÚÇÏá:  ? 12 ÃæäÕÉ ãä ÇáÈíÑÉ.  ? 5 ÃæäÕÉ ãä ÇáÎãÑ.  ? 1.5 ÃæäÕÉ ãä ãÔÑæí ßÍæáí Þæí.    • áÇ ÊÊäÇæá ÇáßÍæáíÇÊ Ïæä ÇáÃßá ãÓÈÞÇð.  • ÍÇÝÙ Úáì ÊäÇæá ßãíÉ æÇÝíÉ ãä ÇáÓæÇÆá. ÊäÇæá ÇáãÇÁ Ãæ ÇáãÔÑæÈÇÊ ÇáÛÇÒíÉ ÇáÎÇáíÉ ãä ÇáÓßÑ Ãæ ÇáÔÇí ÇáãËáÌ ÛíÑ ÇáãÍáì.  • ÞÏ ÊÍÊæí ÇáãÔÑæÈÇÊ ÇáÛÇÒíÉ æÇáÚÕÇÆÑ æÇáãÔÑæÈÇÊ ÇáÃÎÑì ÇáÚÇÏíÉ Úáì ßãíÇÊ ßÈíÑÉ ãä ÇáßÑÈæåíÏÑÇÊ æáÐÇ íÌÈ ÇÍÊÓÇÈåÇ.  ãÇ ÇáÃØÚãÉ ÇáÊí íäÕÍ ÈÇáÇÈÊÚÇÏ ÚäåÇ¿  ÈíäãÇ ÊÞæã ÈÇÎÊíÇÑ ÇáÃØÚãÉ ÇáÊí ÊÊäÇæáåÇ¡ ÇäÊÈå Åáì æÌæÏ ÇÎÊáÇÝÇÊ Èíä ÇáãÃßæáÇÊ. ÝåäÇß ÈÚÖ ÇáÃØÚãÉ ÊÍÊæí Úáì ßãíÇÊ ÃÞá ãä ÇáãæÇÏ ÇáãÛÐíÉ Ýí ÇáÍÕÉ ÇáæÇÍÏÉ ãÞÇÑäÉ ÈÃäæÇÚ ÃÎÑì¡ ÈÇáÑÛã ãä ÇÍÊæÇÆåÇ Úáì ÐÇÊ ÇáÞÏÑ ãä ÇáÓÚÑÇÊ ÇáÍÑÇÑíÉ Ãæ ÇáßÑÈæåíÏÑÇÊ. ãä ÇáÕÚÈ ÊæÝíÑ áÌÓÏß ãÇ íÍÊÇÌå ãä ÚäÇÕÑ ÍíäãÇ ÊÊäÇæá ÃØÚãÉ ÝÞíÑÉ Ýí ÇáãÛÐíÇÊ. ãä ÇáÃãËáÉ Úáì ÇáÃØÚãÉ ÇáÊí Úáíß ÊÌäÈåÇ áÇÍÊæÇÆåÇ Úáì ÞÏÑ ßÈíÑ ãä ÇáÓÚÑÇÊ æÇáßÑÈæåíÏÑÇÊ æÞÏÑ Þáíá ãä ÇáãÛÐíÇÊ:  • ÏåæäãÊÍæáÉ (ÃÛáÈ ÃäæÇÚ ÇáÃØÚãÉ ÇáãÚÇáÌÉ ÊÐßÑ æÌæÏ Ïåæä ãÊÍæáÉ Úáì ãáÕÞ ÇáÍÞÇÆÞ ÇáÛÐÇÆíÉ).  • ÇáãÔÑæÈÇÊ ÇáÛÇÐíÉ ÇáÚÇÏíÉ.  • ÇáÚÕÇÆÑ.  • ÇáÍáæíÇÊ.  • ÇáÍáæì¡ ãËá ÇáÈáßíß æÇáÝØÇÆÑ æÇáÝØÇÆÑ ÇáãÞáíÉ æÇáßÚß.  • ÇáÃØÚãÉ ÇáãÞáíÉ.  ãÇ ÇáÃØÚãÉ ÇáÊí íãßääí ÊäÇæáåÇ¿  ÊäÇæá ÇáÃØÚãÉ ÇáÛäíÉ ÈÇáãÛÐíÇÊ¡ æÇáÊí ÊÞæã ÈÊÛÐíÉ ÌÓÏß æÇáÍÝÇÙ Úáì ÕÍÊß. ßãÇ ÊÚÊãÏ äæÚíÉ ÇáØÚÇã ÇáÐí Úáíß ÊäÇæáå Úáì ÚÏÉ ÚæÇãá ÃÎÑì¡ ãä ÈíäåÇ:  • ÚÏÏ ÇáÓÚÑÇÊ ÇáÍÑÇÑíÉ ÇáÐí ÊÍÊÇÌå.  • ÇáÃÏæíÉ ÇáÊí ÊÊäÇæáåÇ.  • æÒäß.  • ãÓÊæì ÌáæßæÒ ÇáÏã.  • ãÓÊæì ÖÛØ ÇáÏã.  • ãÓÊæì ÇáßæáíÓÊÑæá.  íÌÈ Úáíß ÊäÇæá ÃäæÇÚ ãÎÊáÝÉ ãä ÇáØÚÇã¡ æÇáÊí ÊÔãá:  • ÇáÈÑæÊíäÇÊ.  ? áÍæã ãäÎÝÖÉ  ÇáÏåæä.  ? ÇáÈÑæÊíäÇÊ ãäÎÝÖÉ ÇáÏåæä¡ ãËá ÇáÓãß æÈíÇÖ ÇáÈíÖ æÇáÈÞæáíÇÊ. ÊÌäÈ ÇááÍæã ÇáãÕäÚÉ.    • ÇáÝÇßåÉ æÇáÎÖÑÇæÇÊ.  ? ÇáÝÇßåÉ æÇáÎÖÑÇæÇÊ ÇáÊí ÞÏ ÊÓÇÚÏ Úáì ÇáÊÍßã Ýí ãÓÊæíÇÊ ÌáæßæÒ ÇáÏã¡ ãËá ÇáÊÝÇÍ æÇáãÇäÌæ æÇáÈØÇØÇ.    • ãäÊÌÇÊ ÇáÃáÈÇä.  ? ÇÎÊÑ ãäÊÌÇÊ ÇáÃáÈÇä ÇáÎÇáíÉ ãä ÇáÏåæä Ãæ ãäÎÝÖÉ ÇáÏåæä¡ ãËá ÇáÍáíÈ æÇáÒÈÇÏí æÇáÌÈä.    • ÇáÍÈæÈ æÇáÎÈÒ æÇáãÚÌäÇÊ æÇáÃÑÒ.  ? ÇÎÊÑ ãäÊÌÇÊ ÇáÍÈæÈ ÇáßÇãáÉ¡ ãËá ÇáÎÈÒ ãÊÚÏÏ ÇáÍÈæÈ æÇáÔæÝÇä ÇáßÇãá æÇáÃÑÒ ÇáÈäí. íãßä áåÐå ÇáÃØÚãÉ ãÓÇÚÏÊß Úáì ÖÈØ ÖÛØ ÇáÏã.    • ÇáÏåæä.  ? ÇáÃØÚãÉ ÇáãÍÊæíÉ Úáì Ïåæä ÕÍíÉ¡ ãËá ÇáãßÓÑÇÊ æÇáÃÝæßÇÏæ æÒíÊ ÇáÒíÊæä æÒíÊ ÇáßÇäæáÇ æÇáÓãß ÇáØÇÒÌ.    åá íÊÈÚ ÌãíÚ ãÑÖì ÇáÓßÑ äÝÓ ÇáäÙÇã ÇáÛÐÇÆí¿  äÙÑÇð áÇÎÊáÇÝ ØÈíÚÉ æÍÇáÉ ãÑÖì ÇáÓßÑí¡ áÇ íæÌÏ äÙÇã ÛÐÇÆí æÇÍÏ íäÇÓÈ ÇáÌãíÚ. ãä Çáãåã ááÛÇíÉ ÒíÇÑÉ ÃÎÕÇÆí ÊÛÐíÉ áíÓÇÚÏß Úáì ÅÚÏÇÏ äÙÇã ÛÐÇÆí íÊäÇÓÈ ãÚß.  áíÓ ÇáåÏÝ ãä åÐå ÇáãÚáæãÇÊ Ãä Êßæä ÈÏíáÇð ááÅÑÔÇÏÇÊ ÇáÊí íÞÏãåÇ ãæÝÑ ÇáÑÚÇíÉ ÇáÕÍíÉ. ÊÃßÏ ãä ãäÇÞÔÉ ÃíÉ ÃÓÆáÉ ÊÏæÑ Ýí Ðåäß ãÚ ãæÝÑ ÇáÑÚÇíÉ ÇáÕÍíÉ.þ  Document Released: 04/10/2017 Document Revised: 04/10/2017 Document Reviewed: 11/14/2014  [employer] Interactive Patient Education © 2017 [employer] Inc[employer]

## 2018-05-04 ENCOUNTER — OFFICE VISIT (OUTPATIENT)
Dept: FAMILY MEDICINE CLINIC | Facility: CLINIC | Age: 73
End: 2018-05-04

## 2018-05-04 VITALS
TEMPERATURE: 98.9 F | OXYGEN SATURATION: 99 % | HEART RATE: 81 BPM | SYSTOLIC BLOOD PRESSURE: 126 MMHG | HEIGHT: 62 IN | BODY MASS INDEX: 24.29 KG/M2 | WEIGHT: 132 LBS | DIASTOLIC BLOOD PRESSURE: 72 MMHG

## 2018-05-04 DIAGNOSIS — M79.10 MYALGIA: Primary | ICD-10-CM

## 2018-05-04 PROCEDURE — 99213 OFFICE O/P EST LOW 20 MIN: CPT | Performed by: PHYSICIAN ASSISTANT

## 2018-05-04 NOTE — PROGRESS NOTES
Cathryn Cabello is a 72 y.o. female.     Chief complaint-drug reaction    History of Present Illness     Drug reaction-  She is here today for [facility] emergency room follow-up due to drug reaction.  She reports that on 5/2/2018 she received her tetanus vaccine at this office and then went to [facility] in [place] where she received her shingles vaccine the same day.  She reports that she developed a low-grade fever as well as skin burning and severe myalgias and arthralgias.  She states some relief with Norco opioid analgesic.  On 5/3/2018 she reports that she woke up and myalgias were still severe so she went to [facility] emergency room.  She was diagnosed with arthralgia and noted to have a CPK elevated of 211.  She was given Toradol and released.  Patient reports that symptoms have significantly improved today.  She did have labs redrawn at [facility] this morning but they're still pending.    The following portions of the patient's history were reviewed and updated as appropriate: allergies, current medications, past family history, past medical history, past social history, past surgical history and problem list.    Review of Systems   Constitutional: Negative for activity change, appetite change and fever.   HENT: Negative for ear pain, sinus pressure and sore throat.    Eyes: Negative for pain and visual disturbance.   Respiratory: Negative for cough and chest tightness.    Cardiovascular: Negative for chest pain and palpitations.   Gastrointestinal: Negative for abdominal pain, constipation, diarrhea, nausea and vomiting.   Endocrine: Negative for polydipsia and polyuria.   Genitourinary: Negative for dysuria and frequency.   Musculoskeletal: Positive for arthralgias and myalgias. Negative for back pain.   Skin: Negative for color change and rash.   Allergic/Immunologic: Negative for food allergies and immunocompromised state.   Neurological: Positive for dizziness. Negative for  "syncope, speech difficulty, numbness and headaches.   Hematological: Negative for adenopathy. Does not bruise/bleed easily.   Psychiatric/Behavioral: Negative for hallucinations and suicidal ideas. The patient is not nervous/anxious.        /72   Pulse 81   Temp 98.9 °F (37.2 °C) (Oral)   Ht 157.5 cm (62.01\")   Wt 59.9 kg (132 lb)   LMP 07/08/1986 (Within Months)   SpO2 99%   BMI 24.14 kg/m²     Physical Exam   Constitutional: She is oriented to person, place, and time. She appears well-developed and well-nourished.   Cardiovascular: Normal rate, regular rhythm and normal heart sounds.    No murmur heard.  Pulmonary/Chest: Effort normal and breath sounds normal. No respiratory distress. She has no wheezes. She has no rales. She exhibits no tenderness.   Neurological: She is alert and oriented to person, place, and time.   Skin: Skin is warm and dry.   Psychiatric: She has a normal mood and affect. Her behavior is normal.   Nursing note and vitals reviewed.      Assessment/Plan     Diagnoses and all orders for this visit:    Myalgia    Due to recent vaccinations.  Symptoms have significantly improved.  CPK mildly elevated on 5/3/2018 and lab work redrawn this morning at [facility] is pending and will be requested.  Patient was advised that symptoms should resolve spontaneously with time.  If symptoms should worsen or change she was advised to come back to the office regarding the nearest emergency room for further evaluation and treatment.             This document has been electronically signed by:  Nasrin Washington PA-C  "

## 2018-05-15 ENCOUNTER — OFFICE VISIT (OUTPATIENT)
Dept: SURGERY | Facility: CLINIC | Age: 73
End: 2018-05-15

## 2018-05-15 VITALS — BODY MASS INDEX: 24.29 KG/M2 | HEIGHT: 62 IN | WEIGHT: 132 LBS

## 2018-05-15 DIAGNOSIS — K21.00 GASTROESOPHAGEAL REFLUX DISEASE WITH ESOPHAGITIS: ICD-10-CM

## 2018-05-15 DIAGNOSIS — R13.10 DYSPHAGIA, UNSPECIFIED TYPE: Primary | ICD-10-CM

## 2018-05-15 PROCEDURE — 99213 OFFICE O/P EST LOW 20 MIN: CPT | Performed by: SURGERY

## 2018-05-16 NOTE — PROGRESS NOTES
Subjective   Tyler Cabello is a 72 y.o. female  is here today for follow-up.         Tyler Cabello is a 72 y.o. female here for follow up for epigastric pain.  EGD shows esophagitis and mild gastritis without evidence of Sinclair's esophagus.  The patient is on daily Protonix with some improvement.  Patient continues to have some epigastric discomfort worse in the mornings after taking her medications.  It is described as a tightness or clenching pain in the epigastrium.      Physical Examination:  Abdomen soft nontender nondistended  No acute distress          Tyler was seen today for gastroesophageal reflux disease.    Diagnoses and all orders for this visit:    Dysphagia, unspecified type  -     FL Esophagram Complete; Future    Gastroesophageal reflux disease with esophagitis        Assessment     Tyler Cabello is a 72 y.o. female with epigastric pain likely secondary to reflux esophagitis.  The patient will continue Protonix therapy but will increase to twice daily and follow-up in 1 month.  I will obtain a esophagram to evaluate for esophageal dysfunction.  If symptoms persist on twice-daily therapy she may require evaluation for possible antireflux procedure.

## 2018-05-22 ENCOUNTER — HOSPITAL ENCOUNTER (OUTPATIENT)
Dept: GENERAL RADIOLOGY | Facility: HOSPITAL | Age: 73
Discharge: HOME | End: 2018-05-22
Attending: SURGERY | Admitting: SURGERY

## 2018-05-22 DIAGNOSIS — R13.10 DYSPHAGIA, UNSPECIFIED TYPE: ICD-10-CM

## 2018-05-22 PROCEDURE — 74220 X-RAY XM ESOPHAGUS 1CNTRST: CPT | Performed by: RADIOLOGY

## 2018-05-22 PROCEDURE — 74220 X-RAY XM ESOPHAGUS 1CNTRST: CPT

## 2018-05-23 ENCOUNTER — OFFICE VISIT (OUTPATIENT)
Dept: SURGERY | Facility: CLINIC | Age: 73
End: 2018-05-23

## 2018-05-23 VITALS — WEIGHT: 132 LBS | HEIGHT: 62 IN | BODY MASS INDEX: 24.29 KG/M2

## 2018-05-23 DIAGNOSIS — K21.00 GASTROESOPHAGEAL REFLUX DISEASE WITH ESOPHAGITIS: Primary | ICD-10-CM

## 2018-05-23 PROCEDURE — 99213 OFFICE O/P EST LOW 20 MIN: CPT | Performed by: SURGERY

## 2018-05-24 ENCOUNTER — TELEPHONE (OUTPATIENT)
Dept: SURGERY | Facility: CLINIC | Age: 73
End: 2018-05-24

## 2018-05-24 DIAGNOSIS — R13.19 ESOPHAGEAL DYSPHAGIA: Primary | ICD-10-CM

## 2018-05-24 NOTE — PROGRESS NOTES
Subjective   Tyler Cabello is a 73 y.o. female  is here today for follow-up.         Tyler Cabello is a 73 y.o. female here for follow up after esophagram.  The patient has complaints of emesis and refractory GERD.  Gross aspiration is noted to thick barium on esophagram.  The patient has had previous cervical spine surgery but denies any voice change or abnormality concerning for recurrent laryngeal nerve injury.    General Appearance:  awake, alert, oriented, in no acute distress  Abdomen:  Soft, non-tender, normal bowel sounds; no bruits, organomegaly or masses.        Tyler was seen today for radiology follow up.    Diagnoses and all orders for this visit:    Gastroesophageal reflux disease with esophagitis        Assessment     Tyler Cabello is a 73 y.o. female status post esophagram to evaluate her epigastric pain and reflux.  She has aspiration and I suspect possible vocal cord or laryngeal nerve issue and given a history of cervical surgery could underlie an injury causing swallowing dysfunction.  She will be evaluated by speech therapy and follow-up afterwards.  Continue PPI therapy.

## 2018-05-24 NOTE — TELEPHONE ENCOUNTER
Left a message for patient to contact me back at the office regarding her referral appointment date and time.    Patient is scheduled for Speech Therapy on 6/5/18 @ 1:30pm     Barium Swallow at 10:00am on 6/5/18 here at the hospital.     I will try to contact patient again later on this evening.

## 2018-06-04 ENCOUNTER — APPOINTMENT (OUTPATIENT)
Dept: MAMMOGRAPHY | Facility: HOSPITAL | Age: 73
End: 2018-06-04

## 2018-06-05 ENCOUNTER — HOSPITAL ENCOUNTER (OUTPATIENT)
Dept: SPEECH THERAPY | Facility: HOSPITAL | Age: 73
Setting detail: THERAPIES SERIES
Discharge: HOME | End: 2018-06-05

## 2018-06-05 ENCOUNTER — APPOINTMENT (OUTPATIENT)
Dept: GENERAL RADIOLOGY | Facility: HOSPITAL | Age: 73
End: 2018-06-05
Attending: SURGERY

## 2018-06-05 DIAGNOSIS — R13.19 ESOPHAGEAL DYSPHAGIA: Primary | ICD-10-CM

## 2018-06-05 PROCEDURE — G8997 SWALLOW GOAL STATUS: HCPCS | Performed by: SPEECH-LANGUAGE PATHOLOGIST

## 2018-06-05 PROCEDURE — G8996 SWALLOW CURRENT STATUS: HCPCS | Performed by: SPEECH-LANGUAGE PATHOLOGIST

## 2018-06-05 PROCEDURE — 92610 EVALUATE SWALLOWING FUNCTION: CPT | Performed by: SPEECH-LANGUAGE PATHOLOGIST

## 2018-06-05 NOTE — THERAPY EVALUATION
Outpatient Speech Language Pathology   Adult Swallow Initial Evaluation   [place]     Patient Name: Tyler Cabello  : 1945  MRN: [id number]  Today's Date: 2018         Visit Date: 2018   Patient Active Problem List   Diagnosis   • Gastroesophageal reflux disease with esophagitis   • Moderate persistent asthma with status asthmaticus   • Constipation   • Mixed hyperlipidemia   • Essential hypertension   • RLS (restless legs syndrome)   • Type 2 diabetes mellitus without complication   • 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   • Dry scalp   • Acquired hypothyroidism   • Chronic pain of right knee   • Chronic pain of both knees   • Encounter for immunization   • Screening for breast cancer        Past Medical History:   Diagnosis Date   • Acquired hypothyroidism 2018   • Allergic rhinitis    • Asthma     last asthma attack 2-3 month ago, SEASONAL ALLERGIES   • Chest pain     RELATED TO GALLBLADDER PER PT   • Constipation    • Cough    • Diabetes mellitus    • Hyperlipidemia    • Hypertension    • Malaise and fatigue    • Menopause, premature    • PONV (postoperative nausea and vomiting)    • RLS (restless legs syndrome)    • Swelling, limb         Past Surgical History:   Procedure Laterality Date   •  SECTION     • ENDOSCOPY N/A 3/22/2018    Procedure: ESOPHAGOGASTRODUODENOSCOPY WITH ANESTHESIA;  Surgeon: Don Tovar MD;  Location: [facility];  Service: Gastroenterology   • HYSTERECTOMY     • NECK SURGERY      C6-7 ACDF   • OVARY SURGERY     • AR LAP,CHOLECYSTECTOMY N/A 2016    Procedure: CHOLECYSTECTOMY LAPAROSCOPIC;  Surgeon: Don Tovar MD;  Location: [facility];  Service: General         Visit Dx:     ICD-10-CM ICD-9-CM   1. Esophageal dysphagia R13.10 787.20        Tyler Cabello is seen at [facility] this pm to assess safety/efficacy of swallowing fnx, determine safest/least restrictive diet tolerance. She is observed to be on room air, tolerating w/o  complaints. Pt indicates that a barium swallow study was attempted. Pt indicates medical professional ended barium swallow study prematurely stating that the medical professional informed her that she aspirated during the study. She indicates that the medical professional informed her that this put her at risk for pneumonia. S/T to these findings, pt was referred to speech therapy evaluation by Dr. Tovar at [facility].      Pt is positioned upright and centered in chair to accept multiple po presentations of solid cracker, puree and thin liquids via spoon, cup and straw.  Pt is able to self-feed.     Facial/oral structures are symmetrical upon observation. Lingual protrusion reveals no deviation. Oral mucosa are moist, pink, and clean. Secretions are clear, thin, and well controlled. OROM/ENMA is wfl to imitate oral postures. Gag is not assessed. Volitional cough is intact w/ adequate intensity, clear in quality, non-productive. Voice is adequate in intensity, clear in quality w/ intelligible speech. Pt indicates that primary language is Kiswahili; however, patient is also fluent in English. However, some language/communication barriers identified.     Upon po presentations, adequate bolus anticipation and acceptance w/ good labial seal for bolus clearance via spoon bowl, cup rim stability and suction via straw. Bolus formation, manipulation and control are impaired w/ increased duration w/ rotary mastication pattern. A-p transit is timely w/o oral residue. Piecemeal deglutition observed w/ solid cracker presentation.     Pharyngeal swallow is primarily timely, however, intermittent delays noted.  Adequate hyolaryngeal elevation per palpation.  Overt s/s aspiration evidenced across this evaluation evidenced by coughing w/ thin liquid trials, throat clearing after po trials, and increased mastication. Pt indicates odynophagia evidenced by pt complaint of “knot in my stomach” as well as “burning feeling in my  throat.”    Impression: Based on limited visualization of swallowing fnx, pt educated on importance of completing a MBS as S/S of aspiration were evidenced during today’s evaluation. No SLP dietary recommendations made at this time. Pt educated/instructed to obtain MD order for MBS as soon as possible w/ further recommendations pending results of this evaluation/assessment.     D/w pt results and recommendations w/ verbal agreement.     Thank you for allowing me to participate in the care of your patient-    Lesa Green M.A., CFY-SLP          SLP Adult Swallow Evaluation - 06/05/18 1500        Rehab Evaluation    Document Type evaluation  -BS    Total Evaluation Minutes, SLP 45  -BS    Patient Observations alert;cooperative  -BS    Patient Effort good  -BS       General Information    Patient Profile Reviewed yes  -BS    Current Method of Nutrition thin liquids;regular textures  -BS    Barriers to Rehab language barrier  -BS       Oral Motor and Function    Volitional Swallow WFL  -BS    Volitional Cough WFL  -BS       General Eating/Swallowing Observations    Respiratory Support Currently in Use room air  -BS    Eating/Swallowing Skills self-fed  -BS    Positioning During Eating upright 90 degree;upright in chair  -BS    Utensils Used spoon;cup;straw  -BS    Consistencies Trialed thin liquids;regular textures;pureed  -BS      User Key  (r) = Recorded By, (t) = Taken By, (c) = Cosigned By    Initials Name Provider Type    NATHAN Green CCC-SLP Speech Therapist                                      OP SLP Assessment/Plan - 06/05/18 1500        SLP Assessment    Functional Problems Swallowing  -BS    Impact on Function: Swallowing Risk of aspiration;Risk of pneumonia;Impact on social aspects of eating  -BS    Clinical Impression: Swallowing Moderate:;oropharyngeal phase dysphagia  -BS    Please refer to paper survey for additional self-reported information Yes  -BS    Please refer to items scanned into  chart for additional diagnostic informaiton and handouts as provided by clinician Yes  -BS    SLP Diagnosis Oropharyngeal dysphagia w/ risk of aspiration   -BS    Prognosis Good (comment)  -BS    Patient/caregiver participated in establishment of treatment plan and goals Yes  -BS    Patient would benefit from skilled therapy intervention Yes  -BS       SLP Plan    Frequency 2-4x week  -BS    Duration 12 weeks  -BS    Planned CPT's? SLP SWALLOW THERAPY: 40860  -BS    Expected Duration Therapy Session - minutes 15-30 minutes;30-45 minutes  -BS    Plan Comments eval completed.   -BS      User Key  (r) = Recorded By, (t) = Taken By, (c) = Cosigned By    Initials Name Provider Type    NATHAN Green CCC-MERCEDES Speech Therapist                    Time Calculation:   SLP Start Time: 1454  SLP Stop Time: 1539  SLP Time Calculation (min): 45 min  SLP Non-Billable Time (min): 60 min    Therapy Charges for Today     Code Description Service Date Service Provider Modifiers Qty    03930092396 HC ST SWALLOWING CURRENT STATUS 6/5/2018 JENNIFER Pittman GN, CJ 1    39900105160 HC ST SWALLOWING PROJECTED 6/5/2018 JENNIFER Pittman GN, CI 1    25339142916 HC ST CARE PLAN 15 MIN 6/5/2018 JENNIFER Pittman GN 4    [id number] HC ST EVAL ORAL PHARYNG SWALLOW 3 6/5/2018 JENNIFER Pittman GN 1          SLP G-Codes  Functional Limitations: Swallowing  Swallow Current Status (): At least 20 percent but less than 40 percent impaired, limited or restricted  Swallow Goal Status (): At least 1 percent but less than 20 percent impaired, limited or restricted        JENNIFER Pittman  6/5/2018

## 2018-06-07 ENCOUNTER — HOSPITAL ENCOUNTER (OUTPATIENT)
Dept: GENERAL RADIOLOGY | Facility: HOSPITAL | Age: 73
Discharge: HOME | End: 2018-06-07
Attending: SURGERY | Admitting: SURGERY

## 2018-06-07 DIAGNOSIS — R13.19 ESOPHAGEAL DYSPHAGIA: ICD-10-CM

## 2018-06-07 PROCEDURE — G8997 SWALLOW GOAL STATUS: HCPCS

## 2018-06-07 PROCEDURE — 74230 X-RAY XM SWLNG FUNCJ C+: CPT | Performed by: RADIOLOGY

## 2018-06-07 PROCEDURE — 92611 MOTION FLUOROSCOPY/SWALLOW: CPT

## 2018-06-07 PROCEDURE — G8998 SWALLOW D/C STATUS: HCPCS

## 2018-06-07 PROCEDURE — G8996 SWALLOW CURRENT STATUS: HCPCS

## 2018-06-07 PROCEDURE — 74230 X-RAY XM SWLNG FUNCJ C+: CPT

## 2018-06-07 NOTE — THERAPY DISCHARGE NOTE
Speech Language Pathology Discharge Summary   [facility]       Patient Name: Tyler Cabello  : 1945  MRN: [id number]    Today's Date: 2018          OP SLP Discharge Summary  Date of Discharge: 18  Reason for Discharge: other (see comments)  Progress Toward Achieving Short/long Term Goals: has achieved all goals related to function and safety, other (see comments)  Discharge Destination: home, other (see comments)  Discharge Instructions: pt completed MBS this am w/ no evidence or s/s concerning for aspiration and/or silent aspiration during study. SLP contacts pt via telephone that no dysphagia therapy by SLP recommended at this time. SLP recommends for pt to follow up w/ MD.      Time Calculation:                    Lesa Green CCC-SLP  2018

## 2018-06-07 NOTE — MBS/VFSS/FEES
"Outpatient Speech Language Pathology   Adult Swallow Initial Evaluation   [facility]   OUTPATIENT MODIFIED BARIUM SWALLOW STUDY     Patient Name: Tyler Cabello  : 1945  MRN: [id number]  Today's Date: 2018      Tyler Cabello  presents to the radiology suite this am from her private residence to participate in an instrumental MBS to evaluate safety/efficacy of swallowing fnx, determine safest/least restrictive diet. She is referred today as she is s/p recent esophagram w/ evidence of aspiration. Ms. Cabello reports a significant GI medical history, including JOSE DE JESUS, and states she often vomits/regurgitates after po intake, as well as during the night. She reports this as ongoing for \"10 years or longer.\" She reports h/o EGD w/ evidence of esophagitis as well. She denies any recent PNA.      Current diet of regular consistency and thin liquids. She states increased mastication time is sometimes effective to alleviate regurgitation post meals.     No recent chest xray available for review.     Pt is observed on ra w/o complications.     Risks and benefits of the procedure are explained w/ pt verbalizing understanding/agreement to participate. Proceed per protocol.     Pt is positioned upright and centered in a chair to accept multiple po presentations of solid cracker, puree, honey thick, nectar thick, and thin liquids via spoon, cup and straw, along w/ whole placebo pill in puree. Pt is able to self feed.     All views are from the lateral plane.     Facial/oral structures are symmetrical upon observation w/o lingual deviation upon protrusion. Oral mucosa are moist, pink and clean. Secretions are clear, thin and well controlled. OROM/ENMA is wfl to imitate oral postures. Gag is not assessed. Volitional cough is adequate in intensity, clear in quality, nonproductive. Vocal quality is adequate in intensity, clear in quality w/ intelligible speech. Pt is a/a and cooperative to particpate. She is oriented to person, " place, and time, follows simple directives, and participates in simple conversational exchanges. She evidences w/ sporadic baseline throat clear before, during and after this evaluation.     Upon po presentations, adequate bolus anticipation w/ good labial seal for bolus clearance via spoon bowl, cup rim stability and suction via straw. Bolus formation, manipulation, and control are wfl w/ rotary mastication pattern. A-p transit is timely w/o oral residue. Tongue base retraction and linguavelar seal are adequate w/o premature spillage. No laryngeal penetration or aspiration is evidenced before the swallow.     Pharyngeal swallow is timely w/ adequate hyolaryngeal elevation and epiglottic inversion. Pharyngeal contraction is adequate w/o significant residue. Fleeting laryngeal penetration occurs during the swallow w/ thin liquid via straw only, clearing upon completion of the swallow w/o laryngeal residue. No other laryngeal penetration or aspiration evidenced during or after the swallow.     Pt is able to manipulate and swallow whole placebo pill in puree w/o difficulty.     Full esophageal sweep reveals no obvious mucosal abnormalities. Motility is appears adequate w/ generally delayed transit time w/o obvious retrograde flow noted.      Impression: Per this evaluation, Ms. Cabello presents w/ wfl oropharyngeal swallow w/o evidence of aspiration. She is felt to most benefit from continuing current diet and f/u w/ referring provider.     Recommendations:   1. Regular consistency diet, thin liquids.   2. Meds whole in puree/thins.   3. JOSE DE JESUS precautions.   4. Oral care protocol.   5. Upright and centered for all po intake.     D/w pt at length results and recommendations w/ review of MBS images/videos w/ verbal understanding and agreement.     Thank you for allowing me to participate in the care of your patient-  Brit Rosario M.A., CCC-SLP     Visit Date: 06/07/2018   Patient Active Problem List   Diagnosis   •  Gastroesophageal reflux disease with esophagitis   • Moderate persistent asthma with status asthmaticus   • Constipation   • Mixed hyperlipidemia   • Essential hypertension   • RLS (restless legs syndrome)   • Type 2 diabetes mellitus without complication   • 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   • Dry scalp   • Acquired hypothyroidism   • Chronic pain of right knee   • Chronic pain of both knees   • Encounter for immunization   • Screening for breast cancer     Past Medical History:   Diagnosis Date   • Acquired hypothyroidism 2018   • Allergic rhinitis    • Asthma     last asthma attack 2-3 month ago, SEASONAL ALLERGIES   • Chest pain     RELATED TO GALLBLADDER PER PT   • Constipation    • Cough    • Diabetes mellitus    • Hyperlipidemia    • Hypertension    • Malaise and fatigue    • Menopause, premature    • PONV (postoperative nausea and vomiting)    • RLS (restless legs syndrome)    • Swelling, limb      Past Surgical History:   Procedure Laterality Date   •  SECTION     • ENDOSCOPY N/A 3/22/2018    Procedure: ESOPHAGOGASTRODUODENOSCOPY WITH ANESTHESIA;  Surgeon: Don Tovar MD;  Location: [facility];  Service: Gastroenterology   • HYSTERECTOMY     • NECK SURGERY      C6-7 ACDF   • OVARY SURGERY     • WI LAP,CHOLECYSTECTOMY N/A 2016    Procedure: CHOLECYSTECTOMY LAPAROSCOPIC;  Surgeon: Don Tovar MD;  Location: [facility];  Service: General     Visit Dx:     ICD-10-CM ICD-9-CM   1. Esophageal dysphagia R13.10 787.20           OP SLP Education     Row Name 18 1105       Education    Barriers to Learning No barriers identified  -CM    Education Provided Described results of evaluation;Patient expressed understanding of evaluation  -CM    Assessed Learning motivation  -CM    Learning Motivation Strong  -CM    Learning Method Explanation;Other (comment)   Review of MBS images/videos.   -CM    Teaching Response Verbalized understanding  -CM      User Key  (r) = Recorded By, (t)  = Taken By, (c) = Cosigned By    Initials Name Effective Dates    CM Brit Rosario MA,CCC-SLP 04/03/18 -         Time Calculation:      Therapy Charges for Today     Code Description Service Date Service Provider Modifiers Qty    73444161667 HC ST SWALLOWING CURRENT STATUS 6/7/2018 Brit Rosario MA,CCC-SLP GN,  1    22382399540 HC ST SWALLOWING PROJECTED 6/7/2018 Brit Rosario MA,CCC-SLP GN,  1    84147089172 HC ST SWALLOWING DISCHARGE 6/7/2018 Brit Rosario MA,CCC-SLP GN,  1    94045365952 HC ST MOTION FLUORO EVAL SWALLOW 8 6/7/2018 Brit Rosario MA,CCC-SLP GN 1        SLP G-Codes  SLP NOMS Used?: Yes  Functional Limitations: Swallowing  Swallow Current Status (): 0 percent impaired, limited or restricted  Swallow Goal Status (): 0 percent impaired, limited or restricted  Swallow Discharge Status (): 0 percent impaired, limited or restricted        Brit Rosario MA,CCC-SLP  6/7/2018

## 2018-07-03 ENCOUNTER — HOSPITAL ENCOUNTER (OUTPATIENT)
Dept: MAMMOGRAPHY | Facility: HOSPITAL | Age: 73
Discharge: HOME | End: 2018-07-03
Admitting: NURSE PRACTITIONER

## 2018-07-03 DIAGNOSIS — Z12.39 SCREENING FOR BREAST CANCER: ICD-10-CM

## 2018-07-03 PROCEDURE — 77063 BREAST TOMOSYNTHESIS BI: CPT

## 2018-07-03 PROCEDURE — 77067 SCR MAMMO BI INCL CAD: CPT

## 2018-07-03 PROCEDURE — 77067 SCR MAMMO BI INCL CAD: CPT | Performed by: RADIOLOGY

## 2018-07-03 PROCEDURE — 77063 BREAST TOMOSYNTHESIS BI: CPT | Performed by: RADIOLOGY

## 2018-07-10 DIAGNOSIS — M19.90 ARTHRITIS: ICD-10-CM

## 2018-07-10 DIAGNOSIS — E78.2 MIXED HYPERLIPIDEMIA: ICD-10-CM

## 2018-07-10 DIAGNOSIS — Z11.59 ENCOUNTER FOR HEPATITIS C SCREENING TEST FOR LOW RISK PATIENT: ICD-10-CM

## 2018-07-10 DIAGNOSIS — E11.9 TYPE 2 DIABETES MELLITUS WITHOUT COMPLICATION, WITHOUT LONG-TERM CURRENT USE OF INSULIN (HCC): ICD-10-CM

## 2018-07-10 DIAGNOSIS — E55.9 VITAMIN D DEFICIENCY: ICD-10-CM

## 2018-07-10 DIAGNOSIS — I10 ESSENTIAL HYPERTENSION: ICD-10-CM

## 2018-07-10 DIAGNOSIS — E03.9 ACQUIRED HYPOTHYROIDISM: ICD-10-CM

## 2018-07-10 LAB
25(OH)D3 SERPL-MCNC: 45 NG/ML
ALBUMIN SERPL-MCNC: 4.4 G/DL (ref 3.4–4.8)
ALBUMIN/GLOB SERPL: 1.8 G/DL (ref 1.5–2.5)
ALP SERPL-CCNC: 66 U/L (ref 35–104)
ALT SERPL W P-5'-P-CCNC: 25 U/L (ref 10–36)
ANION GAP SERPL CALCULATED.3IONS-SCNC: 7.1 MMOL/L (ref 3.6–11.2)
AST SERPL-CCNC: 17 U/L (ref 10–30)
BASOPHILS # BLD AUTO: 0.03 10*3/MM3 (ref 0–0.3)
BASOPHILS NFR BLD AUTO: 0.4 % (ref 0–2)
BILIRUB SERPL-MCNC: 0.5 MG/DL (ref 0.2–1.8)
BUN BLD-MCNC: 16 MG/DL (ref 7–21)
BUN/CREAT SERPL: 17.2 (ref 7–25)
CALCIUM SPEC-SCNC: 9 MG/DL (ref 7.7–10)
CHLORIDE SERPL-SCNC: 106 MMOL/L (ref 99–112)
CHOLEST SERPL-MCNC: 220 MG/DL (ref 0–200)
CHROMATIN AB SERPL-ACNC: 7 IU/ML (ref 0–14)
CO2 SERPL-SCNC: 27.9 MMOL/L (ref 24.3–31.9)
CREAT BLD-MCNC: 0.93 MG/DL (ref 0.43–1.29)
CRP SERPL-MCNC: <0.5 MG/DL (ref 0–0.99)
DEPRECATED RDW RBC AUTO: 40 FL (ref 37–54)
EOSINOPHIL # BLD AUTO: 0.12 10*3/MM3 (ref 0–0.7)
EOSINOPHIL NFR BLD AUTO: 1.7 % (ref 0–7)
ERYTHROCYTE [DISTWIDTH] IN BLOOD BY AUTOMATED COUNT: 12.4 % (ref 11.5–14.5)
ERYTHROCYTE [SEDIMENTATION RATE] IN BLOOD: 8 MM/HR (ref 0–30)
GFR SERPL CREATININE-BSD FRML MDRD: 59 ML/MIN/1.73
GFR SERPL CREATININE-BSD FRML MDRD: 72 ML/MIN/1.73
GLOBULIN UR ELPH-MCNC: 2.5 GM/DL
GLUCOSE BLD-MCNC: 93 MG/DL (ref 70–110)
HBA1C MFR BLD: 6.4 % (ref 4.5–5.7)
HCT VFR BLD AUTO: 40.1 % (ref 37–47)
HCV AB SER DONR QL: NORMAL
HDLC SERPL-MCNC: 43 MG/DL (ref 60–100)
HGB BLD-MCNC: 13.7 G/DL (ref 12–16)
IMM GRANULOCYTES # BLD: 0.01 10*3/MM3 (ref 0–0.03)
IMM GRANULOCYTES NFR BLD: 0.1 % (ref 0–0.5)
LDLC SERPL CALC-MCNC: 130 MG/DL (ref 0–100)
LDLC/HDLC SERPL: 3.03 {RATIO}
LYMPHOCYTES # BLD AUTO: 2.33 10*3/MM3 (ref 1–3)
LYMPHOCYTES NFR BLD AUTO: 33.6 % (ref 16–46)
MCH RBC QN AUTO: 31 PG (ref 27–33)
MCHC RBC AUTO-ENTMCNC: 34.2 G/DL (ref 33–37)
MCV RBC AUTO: 90.7 FL (ref 80–94)
MONOCYTES # BLD AUTO: 0.52 10*3/MM3 (ref 0.1–0.9)
MONOCYTES NFR BLD AUTO: 7.5 % (ref 0–12)
NEUTROPHILS # BLD AUTO: 3.92 10*3/MM3 (ref 1.4–6.5)
NEUTROPHILS NFR BLD AUTO: 56.7 % (ref 40–75)
OSMOLALITY SERPL CALC.SUM OF ELEC: 282.1 MOSM/KG (ref 273–305)
PLATELET # BLD AUTO: 272 10*3/MM3 (ref 130–400)
PMV BLD AUTO: 10.9 FL (ref 6–10)
POTASSIUM BLD-SCNC: 4.2 MMOL/L (ref 3.5–5.3)
PROT SERPL-MCNC: 6.9 G/DL (ref 6–8)
RBC # BLD AUTO: 4.42 10*6/MM3 (ref 4.2–5.4)
SODIUM BLD-SCNC: 141 MMOL/L (ref 135–153)
TRIGL SERPL-MCNC: 234 MG/DL (ref 0–150)
TSH SERPL DL<=0.05 MIU/L-ACNC: 4.71 MIU/ML (ref 0.55–4.78)
VIT B12 BLD-MCNC: 1799 PG/ML (ref 211–911)
VLDLC SERPL-MCNC: 46.8 MG/DL
WBC NRBC COR # BLD: 6.93 10*3/MM3 (ref 4.5–12.5)

## 2018-07-10 PROCEDURE — 86803 HEPATITIS C AB TEST: CPT | Performed by: NURSE PRACTITIONER

## 2018-07-10 PROCEDURE — 83036 HEMOGLOBIN GLYCOSYLATED A1C: CPT | Performed by: NURSE PRACTITIONER

## 2018-07-10 PROCEDURE — 80061 LIPID PANEL: CPT | Performed by: NURSE PRACTITIONER

## 2018-07-10 PROCEDURE — 85652 RBC SED RATE AUTOMATED: CPT | Performed by: NURSE PRACTITIONER

## 2018-07-10 PROCEDURE — 86431 RHEUMATOID FACTOR QUANT: CPT | Performed by: NURSE PRACTITIONER

## 2018-07-10 PROCEDURE — 86140 C-REACTIVE PROTEIN: CPT | Performed by: NURSE PRACTITIONER

## 2018-07-10 PROCEDURE — 80053 COMPREHEN METABOLIC PANEL: CPT | Performed by: NURSE PRACTITIONER

## 2018-07-10 PROCEDURE — 86038 ANTINUCLEAR ANTIBODIES: CPT | Performed by: NURSE PRACTITIONER

## 2018-07-10 PROCEDURE — 84443 ASSAY THYROID STIM HORMONE: CPT | Performed by: NURSE PRACTITIONER

## 2018-07-10 PROCEDURE — 85025 COMPLETE CBC W/AUTO DIFF WBC: CPT | Performed by: NURSE PRACTITIONER

## 2018-07-10 PROCEDURE — 82306 VITAMIN D 25 HYDROXY: CPT | Performed by: NURSE PRACTITIONER

## 2018-07-10 PROCEDURE — 82607 VITAMIN B-12: CPT | Performed by: NURSE PRACTITIONER

## 2018-07-11 ENCOUNTER — TELEPHONE (OUTPATIENT)
Dept: FAMILY MEDICINE CLINIC | Facility: CLINIC | Age: 73
End: 2018-07-11

## 2018-07-11 LAB — ANA SER QL: NEGATIVE

## 2018-07-11 NOTE — TELEPHONE ENCOUNTER
----- Message from ZHOU Villar sent at 7/10/2018  3:11 PM EDT -----  Stop B-12 supplementation.    Left a message

## 2018-07-25 ENCOUNTER — HOSPITAL ENCOUNTER (OUTPATIENT)
Dept: GENERAL RADIOLOGY | Facility: HOSPITAL | Age: 73
Discharge: HOME | End: 2018-07-25

## 2018-07-25 ENCOUNTER — HOSPITAL ENCOUNTER (OUTPATIENT)
Dept: GENERAL RADIOLOGY | Facility: HOSPITAL | Age: 73
Discharge: HOME | End: 2018-07-25
Admitting: NURSE PRACTITIONER

## 2018-07-25 ENCOUNTER — OFFICE VISIT (OUTPATIENT)
Dept: SURGERY | Facility: CLINIC | Age: 73
End: 2018-07-25

## 2018-07-25 ENCOUNTER — OFFICE VISIT (OUTPATIENT)
Dept: FAMILY MEDICINE CLINIC | Facility: CLINIC | Age: 73
End: 2018-07-25

## 2018-07-25 VITALS
SYSTOLIC BLOOD PRESSURE: 140 MMHG | BODY MASS INDEX: 24.29 KG/M2 | TEMPERATURE: 98.5 F | HEIGHT: 62 IN | HEART RATE: 94 BPM | DIASTOLIC BLOOD PRESSURE: 82 MMHG | OXYGEN SATURATION: 98 % | WEIGHT: 132 LBS

## 2018-07-25 VITALS — BODY MASS INDEX: 24.29 KG/M2 | WEIGHT: 132 LBS | HEIGHT: 62 IN

## 2018-07-25 DIAGNOSIS — M50.30 DDD (DEGENERATIVE DISC DISEASE), CERVICAL: ICD-10-CM

## 2018-07-25 DIAGNOSIS — M25.561 CHRONIC PAIN OF RIGHT KNEE: ICD-10-CM

## 2018-07-25 DIAGNOSIS — G25.81 RLS (RESTLESS LEGS SYNDROME): ICD-10-CM

## 2018-07-25 DIAGNOSIS — E03.9 ACQUIRED HYPOTHYROIDISM: ICD-10-CM

## 2018-07-25 DIAGNOSIS — K21.00 GASTROESOPHAGEAL REFLUX DISEASE WITH ESOPHAGITIS: Primary | ICD-10-CM

## 2018-07-25 DIAGNOSIS — G89.29 CHRONIC PAIN OF RIGHT KNEE: ICD-10-CM

## 2018-07-25 DIAGNOSIS — I10 ESSENTIAL HYPERTENSION: ICD-10-CM

## 2018-07-25 DIAGNOSIS — K21.00 GASTROESOPHAGEAL REFLUX DISEASE WITH ESOPHAGITIS: ICD-10-CM

## 2018-07-25 DIAGNOSIS — E11.9 TYPE 2 DIABETES MELLITUS WITHOUT COMPLICATION, WITHOUT LONG-TERM CURRENT USE OF INSULIN (HCC): ICD-10-CM

## 2018-07-25 DIAGNOSIS — J45.42 MODERATE PERSISTENT ASTHMA WITH STATUS ASTHMATICUS: ICD-10-CM

## 2018-07-25 DIAGNOSIS — M50.30 DDD (DEGENERATIVE DISC DISEASE), CERVICAL: Primary | ICD-10-CM

## 2018-07-25 PROCEDURE — 72072 X-RAY EXAM THORAC SPINE 3VWS: CPT | Performed by: RADIOLOGY

## 2018-07-25 PROCEDURE — 99214 OFFICE O/P EST MOD 30 MIN: CPT | Performed by: NURSE PRACTITIONER

## 2018-07-25 PROCEDURE — 73030 X-RAY EXAM OF SHOULDER: CPT

## 2018-07-25 PROCEDURE — 73030 X-RAY EXAM OF SHOULDER: CPT | Performed by: RADIOLOGY

## 2018-07-25 PROCEDURE — 72040 X-RAY EXAM NECK SPINE 2-3 VW: CPT

## 2018-07-25 PROCEDURE — 72052 X-RAY EXAM NECK SPINE 6/>VWS: CPT | Performed by: RADIOLOGY

## 2018-07-25 PROCEDURE — 99212 OFFICE O/P EST SF 10 MIN: CPT | Performed by: SURGERY

## 2018-07-25 PROCEDURE — 72070 X-RAY EXAM THORAC SPINE 2VWS: CPT

## 2018-07-25 PROCEDURE — 96372 THER/PROPH/DIAG INJ SC/IM: CPT | Performed by: NURSE PRACTITIONER

## 2018-07-25 RX ORDER — TRAMADOL HYDROCHLORIDE 100 MG/1
200 TABLET, EXTENDED RELEASE ORAL DAILY
Qty: 60 TABLET | Refills: 2 | Status: SHIPPED | OUTPATIENT
Start: 2018-07-25 | End: 2018-08-29 | Stop reason: SDUPTHER

## 2018-07-25 RX ORDER — HYDROCODONE BITARTRATE AND ACETAMINOPHEN 7.5; 325 MG/1; MG/1
1 TABLET ORAL EVERY 6 HOURS PRN
Qty: 16 TABLET | Refills: 0 | Status: SHIPPED | OUTPATIENT
Start: 2018-07-25 | End: 2018-08-29 | Stop reason: SDUPTHER

## 2018-07-25 RX ORDER — METHYLPREDNISOLONE ACETATE 80 MG/ML
80 INJECTION, SUSPENSION INTRA-ARTICULAR; INTRALESIONAL; INTRAMUSCULAR; SOFT TISSUE ONCE
Status: COMPLETED | OUTPATIENT
Start: 2018-07-25 | End: 2018-07-25

## 2018-07-25 RX ORDER — GABAPENTIN 100 MG/1
CAPSULE ORAL
Qty: 60 CAPSULE | Refills: 2 | Status: SHIPPED | OUTPATIENT
Start: 2018-07-25 | End: 2018-08-29 | Stop reason: SDUPTHER

## 2018-07-25 RX ADMIN — METHYLPREDNISOLONE ACETATE 80 MG: 80 INJECTION, SUSPENSION INTRA-ARTICULAR; INTRALESIONAL; INTRAMUSCULAR; SOFT TISSUE at 12:55

## 2018-07-25 NOTE — PROGRESS NOTES
Cathryn Cabello is a 73 y.o. female.     Chief Complaint   Patient presents with   • Anxiety   • Diabetes   • Heartburn       History of Present Illness     DM- stable with diet changes     Increased pain in her neck and shoulders. History of cervical surgery with fusion. She continues to have low back and knee pain as well. Her pain is rated 8 on psr of 0-10 with current medication regimen. She reports that she is having difficulty sleeping at night and performing her daily routine. Pain is described as aching , throbbing and radiating into both shoulders with tingling down both arms into hands at times. She is currently receiving a muscle relaxer and ultram. She has taken Norco only on rare occasion when her pain is severe. 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 Once enjoyed sewing but is no longer able to sew due to pain in her hands.     gerd - some occasional exacerbations with certain foods    Hypothyroidism - stable     Chronic allergies - improved with singulair and zyrtec     Anxiety - pt feels very anxious regarding her husbands upcoming CABG as well as her granddaughter's recent eye surgery.     Hypertension - stable with verapamil.    RLS - stable with requip             The following portions of the patient's history were reviewed and updated as appropriate: allergies, current medications, past family history, past medical history, past social history, past surgical history and problem list.    Review of Systems   Constitutional: Negative for appetite change, fatigue and unexpected weight change.   HENT: Negative for congestion, ear pain, nosebleeds, postnasal drip, rhinorrhea, sore throat, trouble swallowing and voice change.    Eyes: Positive for visual disturbance. Negative for pain.   Respiratory: Negative.  Negative for cough, shortness of breath and wheezing.    Cardiovascular: Negative.  Negative for chest pain and palpitations.   Gastrointestinal: Positive for nausea. Negative for  "abdominal pain, blood in stool, constipation, diarrhea and vomiting.   Endocrine: Negative for cold intolerance and polydipsia.   Genitourinary: Negative for difficulty urinating, flank pain and hematuria.   Musculoskeletal: Positive for arthralgias, back pain, joint swelling and neck pain. Negative for gait problem, myalgias ( ) and neck stiffness.   Skin: Negative for color change and rash.   Allergic/Immunologic: Negative.    Neurological: Positive for numbness. Negative for syncope and headaches.   Hematological: Negative.    Psychiatric/Behavioral: Negative for dysphoric mood, self-injury, sleep disturbance and suicidal ideas. The patient is nervous/anxious.    All other systems reviewed and are negative.      Objective     /82   Pulse 94   Temp 98.5 °F (36.9 °C) (Temporal Artery )   Ht 157.5 cm (62\")   Wt 59.9 kg (132 lb)   LMP 07/08/1986 (LMP Unknown)   SpO2 98%   BMI 24.14 kg/m²   Orders Only on 07/10/2018   Component Date Value Ref Range Status   • Sed Rate 07/10/2018 8  0 - 30 mm/hr Final   • C-Reactive Protein 07/10/2018 <0.50  0.00 - 0.99 mg/dL Final   • FELICIA Direct 07/10/2018 Negative  Negative Final   • Rheumatoid Factor Quantitative 07/10/2018 7.0  0.0 - 14.0 IU/mL Final   • Hepatitis C Ab 07/10/2018 Non-Reactive  Non-Reactive Final   • Glucose 07/10/2018 93  70 - 110 mg/dL Final   • BUN 07/10/2018 16  7 - 21 mg/dL Final   • Creatinine 07/10/2018 0.93  0.43 - 1.29 mg/dL Final   • Sodium 07/10/2018 141  135 - 153 mmol/L Final   • Potassium 07/10/2018 4.2  3.5 - 5.3 mmol/L Final   • Chloride 07/10/2018 106  99 - 112 mmol/L Final   • CO2 07/10/2018 27.9  24.3 - 31.9 mmol/L Final   • Calcium 07/10/2018 9.0  7.7 - 10.0 mg/dL Final   • Total Protein 07/10/2018 6.9  6.0 - 8.0 g/dL Final   • Albumin 07/10/2018 4.40  3.40 - 4.80 g/dL Final   • ALT (SGPT) 07/10/2018 25  10 - 36 U/L Final   • AST (SGOT) 07/10/2018 17  10 - 30 U/L Final   • Alkaline Phosphatase 07/10/2018 66  35 - 104 U/L Final   • " Total Bilirubin 07/10/2018 0.5  0.2 - 1.8 mg/dL Final   • eGFR Non African Amer 07/10/2018 59* >60 mL/min/1.73 Final   • eGFR   Amer 07/10/2018 72  >60 mL/min/1.73 Final   • Globulin 07/10/2018 2.5  gm/dL Final   • A/G Ratio 07/10/2018 1.8  1.5 - 2.5 g/dL Final   • BUN/Creatinine Ratio 07/10/2018 17.2  7.0 - 25.0 Final   • Anion Gap 07/10/2018 7.1  3.6 - 11.2 mmol/L Final   • TSH 07/10/2018 4.711  0.550 - 4.780 mIU/mL Final   • Hemoglobin A1C 07/10/2018 6.40* 4.50 - 5.70 % Final   • 25 Hydroxy, Vitamin D 07/10/2018 45.0  ng/ml Final   • Total Cholesterol 07/10/2018 220* 0 - 200 mg/dL Final   • Triglycerides 07/10/2018 234* 0 - 150 mg/dL Final   • HDL Cholesterol 07/10/2018 43* 60 - 100 mg/dL Final   • LDL Cholesterol  07/10/2018 130* 0 - 100 mg/dL Final   • VLDL Cholesterol 07/10/2018 46.8  mg/dL Final   • LDL/HDL Ratio 07/10/2018 3.03   Final   • Vitamin B-12 07/10/2018 1799* 211 - 911 pg/mL Final   • WBC 07/10/2018 6.93  4.50 - 12.50 10*3/mm3 Final   • RBC 07/10/2018 4.42  4.20 - 5.40 10*6/mm3 Final   • Hemoglobin 07/10/2018 13.7  12.0 - 16.0 g/dL Final   • Hematocrit 07/10/2018 40.1  37.0 - 47.0 % Final   • MCV 07/10/2018 90.7  80.0 - 94.0 fL Final   • MCH 07/10/2018 31.0  27.0 - 33.0 pg Final   • MCHC 07/10/2018 34.2  33.0 - 37.0 g/dL Final   • RDW 07/10/2018 12.4  11.5 - 14.5 % Final   • RDW-SD 07/10/2018 40.0  37.0 - 54.0 fl Final   • MPV 07/10/2018 10.9* 6.0 - 10.0 fL Final   • Platelets 07/10/2018 272  130 - 400 10*3/mm3 Final   • Neutrophil % 07/10/2018 56.7  40.0 - 75.0 % Final   • Lymphocyte % 07/10/2018 33.6  16.0 - 46.0 % Final   • Monocyte % 07/10/2018 7.5  0.0 - 12.0 % Final   • Eosinophil % 07/10/2018 1.7  0.0 - 7.0 % Final   • Basophil % 07/10/2018 0.4  0.0 - 2.0 % Final   • Immature Grans % 07/10/2018 0.1  0.0 - 0.5 % Final   • Neutrophils, Absolute 07/10/2018 3.92  1.40 - 6.50 10*3/mm3 Final   • Lymphocytes, Absolute 07/10/2018 2.33  1.00 - 3.00 10*3/mm3 Final   • Monocytes, Absolute  07/10/2018 0.52  0.10 - 0.90 10*3/mm3 Final   • Eosinophils, Absolute 07/10/2018 0.12  0.00 - 0.70 10*3/mm3 Final   • Basophils, Absolute 07/10/2018 0.03  0.00 - 0.30 10*3/mm3 Final   • Immature Grans, Absolute 07/10/2018 0.01  0.00 - 0.03 10*3/mm3 Final   • Osmolality Calc 07/10/2018 282.1  273.0 - 305.0 mOsm/kg Final       Physical Exam   Constitutional: She is oriented to person, place, and time. Vital signs are normal. She appears well-developed and well-nourished.   Talkative and friendly , does appear anxious today   HENT:   Head: Normocephalic.   Right Ear: External ear normal.   Left Ear: External ear normal.   Nose: Nose normal.   Mouth/Throat: Oropharynx is clear and moist.   Eyes: Pupils are equal, round, and reactive to light.   Neck: Normal range of motion. Neck supple. No tracheal deviation present. No thyromegaly present.   Cardiovascular: Normal rate, regular rhythm and normal heart sounds.  Exam reveals no gallop and no friction rub.    No murmur heard.  Pulmonary/Chest: Effort normal and breath sounds normal. No respiratory distress. She has no wheezes. She has no rales. She exhibits no tenderness.   Abdominal: Soft. Bowel sounds are normal. She exhibits no distension and no mass. There is no tenderness. There is no rebound and no guarding.   Musculoskeletal:        Right shoulder: She exhibits decreased range of motion and crepitus.        Left shoulder: She exhibits decreased range of motion and crepitus.        Cervical back: She exhibits decreased range of motion, tenderness, pain and spasm.        Lumbar back: She exhibits decreased range of motion, pain and spasm.   Neurological: She is alert and oriented to person, place, and time. She has normal reflexes.   CN 2-12 grossly intact    Skin: Skin is warm and dry. No rash noted. No erythema.   Psychiatric: Her speech is normal and behavior is normal. Judgment and thought content normal. Her mood appears anxious. Cognition and memory are normal.        Assessment/Plan     Problem List Items Addressed This Visit        Musculoskeletal and Integument    Chronic pain of right knee    Relevant Medications    traMADol ER (ULTRAM-ER) 100 MG 24 hr tablet    HYDROcodone-acetaminophen (NORCO) 7.5-325 MG per tablet    gabapentin (NEURONTIN) 100 MG capsule    methylPREDNISolone acetate (DEPO-medrol) injection 80 mg (Completed)    Other Relevant Orders    XR Spine Cervical 2 or 3 View (Completed)    XR Shoulder 2+ View Right (Completed)    XR Shoulder 2+ View Left (Completed)    XR spine thoracic 2 vw (Completed)    Urine Drug Screen - Urine, Clean Catch    DDD (degenerative disc disease), cervical - Primary    Relevant Medications    traMADol ER (ULTRAM-ER) 100 MG 24 hr tablet    HYDROcodone-acetaminophen (NORCO) 7.5-325 MG per tablet    gabapentin (NEURONTIN) 100 MG capsule    methylPREDNISolone acetate (DEPO-medrol) injection 80 mg (Completed)    Other Relevant Orders    XR Spine Cervical 2 or 3 View (Completed)    XR Shoulder 2+ View Right (Completed)    XR Shoulder 2+ View Left (Completed)    XR spine thoracic 2 vw (Completed)    Urine Drug Screen - Urine, Clean Catch          xrays ordered and to be performed today. I have suggested that she return to her neurosurgeon for evaluation of previous fusion. Discussed likely DDD of spine and disease progression.   Increase ultram to 200 mg daily.  Add Neurontin to her medication regimen as this will likely offer her some radiculopathy pain/tingle relief.         Patient's Body mass index is 24.14 kg/m². BMI is above normal parameters. Recommendations include: exercise counseling and nutrition counseling.    Recent lab results as listed above have been reviewed and discussed.  I have discussed diagnosis in detail today allowing time for questions and answers. Pt is aware of reasons to seek urgent or emergent medical care as well as reasons to return to the clinic for evaluation. Possible side effects, interactions and  progression of symptoms discussed as well. Pt / family states understanding.   Emotional support and active listening provided.   As part of this patient's treatment plan they are being prescribed controlled substance/substances with potential for abuse. This patient has been made aware of the appropriate use of such medications, including potential risk of somnolence, limited ability to drive and / or work safely, and potential for overdose. It has also been made clear that these medications are for use by this patient only, without concomitant use of alcohol or other substances unless prescribed/advised by medical provider. Patient has completed controlled substance agreement detailing terms of continued prescribing of controlled substances including monitoring Diaz reports, drug screens and pill counts. The patient was asked and states they are not receiving narcotic medication from any there provider or any provider that this office has not been made aware of. History and physical exam exhibit continued safe and appropriate use of controlled substances.   Follow up in one month, sooner if needed. Routine labs every 3-6 months.           Errors in dictation may reflect use of voice recognition software and not all errors in transcription may have been detected prior to signing.       This document has been electronically signed by:  ZHOU Moncada, NP-C

## 2018-07-26 NOTE — PROGRESS NOTES
Subjective   Tyler Cabello is a 73 y.o. female  is here today for follow-up.         Tyler Cabello is a 73 y.o. female here for follow up after esophagram.  The patient has complaints of emesis and refractory GERD.  Gross aspiration is noted to thick barium on esophagram.  The patient has had previous cervical spine surgery but denies any voice change or abnormality concerning for recurrent laryngeal nerve injury.    General Appearance:  awake, alert, oriented, in no acute distress  Abdomen:  Soft, non-tender, normal bowel sounds; no bruits, organomegaly or masses.        Tyler was seen today for heartburn and difficulty swallowing.    Diagnoses and all orders for this visit:    Gastroesophageal reflux disease with esophagitis        Assessment     Tyler Cabello is a 73 y.o. female status post esophagram to evaluate her epigastric pain and reflux.  She has aspiration and I suspect possible vocal cord or laryngeal nerve issue and given a history of cervical surgery could underlie an injury causing swallowing dysfunction.  She'll continue PPI therapy and dietary modification.  She will follow-up as needed

## 2018-08-25 NOTE — H&P (VIEW-ONLY)
Cathryn Cabello is a 72 y.o. female is being seen for consultation today at the request of Miranda Kinsey Brought*    73 yo F with persistent epigastric pain.  This was prior to cholecystectomy last year.  Pain in subxyphoid region radiating to the LUQ.  H. Pylori negative.  No unintentional weight loss.  Pain persists despite PPI therapy for several months.        Past Medical History:   Diagnosis Date   • Acquired hypothyroidism 2018   • Allergic rhinitis    • Asthma     last asthma attack 2-3 month ago, SEASONAL ALLERGIES   • Chest pain     RELATED TO GALLBLADDER PER PT   • Constipation    • Cough    • Diabetes mellitus    • Hyperlipidemia    • Hypertension    • Malaise and fatigue    • Menopause, premature    • PONV (postoperative nausea and vomiting)    • RLS (restless legs syndrome)    • Swelling, limb        Family History   Problem Relation Age of Onset   • Family history unknown: Yes       Social History     Social History   • Marital status:      Spouse name: N/A   • Number of children: N/A   • Years of education: N/A     Occupational History   • Not on file.     Social History Main Topics   • Smoking status: Former Smoker     Packs/day: 0.50     Years: 25.00     Types: Cigarettes   • Smokeless tobacco: Former User     Quit date: 2008      Comment: 25-30 YEARS   • Alcohol use No   • Drug use: No   • Sexual activity: Defer     Other Topics Concern   • Not on file     Social History Narrative   • No narrative on file       Past Surgical History:   Procedure Laterality Date   •  SECTION     • HYSTERECTOMY     • NECK SURGERY      C6-7 ACDF   • OVARY SURGERY     • WA LAP,CHOLECYSTECTOMY N/A 2016    Procedure: CHOLECYSTECTOMY LAPAROSCOPIC;  Surgeon: Don Tovar MD;  Location: [facility];  Service: General       The following portions of the patient's history were reviewed and updated as appropriate: allergies, current medications, past family history, past medical  "history, past social history, past surgical history and problem list.    Review of Systems   Constitutional: Negative for activity change, appetite change, chills and fever.   HENT: Negative for sore throat and trouble swallowing.    Eyes: Negative for visual disturbance.   Respiratory: Negative for cough and shortness of breath.    Cardiovascular: Negative for chest pain and palpitations.   Gastrointestinal: Positive for abdominal pain and constipation. Negative for abdominal distention, blood in stool, diarrhea, nausea and vomiting.   Endocrine: Negative for cold intolerance and heat intolerance.   Genitourinary: Negative for dysuria.   Musculoskeletal: Negative for joint swelling.   Skin: Negative for color change, rash and wound.   Allergic/Immunologic: Negative for immunocompromised state.   Neurological: Negative for dizziness, seizures, weakness and headaches.   Hematological: Negative for adenopathy. Does not bruise/bleed easily.   Psychiatric/Behavioral: Negative for agitation and confusion.         /81   Pulse 85   Ht 152.4 cm (60\")   Wt 59 kg (130 lb)   LMP 07/08/1986 (Within Months)   BMI 25.39 kg/m²   Objective   Physical Exam   Constitutional: She is oriented to person, place, and time. She appears well-developed.   HENT:   Head: Normocephalic and atraumatic.   Mouth/Throat: Mucous membranes are normal.   Eyes: Conjunctivae are normal. Pupils are equal, round, and reactive to light.   Neck: Neck supple. No JVD present. No tracheal deviation present. No thyromegaly present.   Cardiovascular: Normal rate and regular rhythm.  Exam reveals no gallop and no friction rub.    No murmur heard.  Pulmonary/Chest: Effort normal and breath sounds normal.   Abdominal: Soft. She exhibits no distension. There is no splenomegaly or hepatomegaly. There is no tenderness. No hernia.   Musculoskeletal: Normal range of motion. She exhibits no deformity.   Neurological: She is alert and oriented to person, place, " and time.   Skin: Skin is warm and dry.   Psychiatric: She has a normal mood and affect.         Tyler was seen today for egd consult.    Diagnoses and all orders for this visit:    Gastroesophageal reflux disease with esophagitis  -     Case Request; Standing  -     Case Request    Other orders  -     Follow Anesthesia Guidelines / Standing Orders; Future  -     Provide instructions to patient on NPO status  -     Follow Anesthesia Guidelines / Standing Orders; Standing  -     Verify NPO Status; Standing  -     Obtain informed consent; Standing        Assessment     71 yo F with epigastric pain that last several weeks intermittently.  No improvement despite medical PPI therapy.  She understands the risks and benefits and will undergo EGD 3/22/18.    Discussed the patient's BMI with her. BMI is within normal parameters. No follow-up required.              [address] Comprehensive

## 2018-08-29 ENCOUNTER — OFFICE VISIT (OUTPATIENT)
Dept: FAMILY MEDICINE CLINIC | Facility: CLINIC | Age: 73
End: 2018-08-29

## 2018-08-29 VITALS
HEART RATE: 92 BPM | TEMPERATURE: 98.4 F | OXYGEN SATURATION: 98 % | BODY MASS INDEX: 24.29 KG/M2 | SYSTOLIC BLOOD PRESSURE: 130 MMHG | WEIGHT: 132 LBS | HEIGHT: 62 IN | DIASTOLIC BLOOD PRESSURE: 80 MMHG

## 2018-08-29 DIAGNOSIS — J45.42 MODERATE PERSISTENT ASTHMA WITH STATUS ASTHMATICUS: ICD-10-CM

## 2018-08-29 DIAGNOSIS — R30.0 DYSURIA: ICD-10-CM

## 2018-08-29 DIAGNOSIS — G25.81 RLS (RESTLESS LEGS SYNDROME): ICD-10-CM

## 2018-08-29 DIAGNOSIS — M50.30 DDD (DEGENERATIVE DISC DISEASE), CERVICAL: ICD-10-CM

## 2018-08-29 DIAGNOSIS — E03.9 ACQUIRED HYPOTHYROIDISM: ICD-10-CM

## 2018-08-29 DIAGNOSIS — F32.9 REACTIVE DEPRESSION: Primary | ICD-10-CM

## 2018-08-29 DIAGNOSIS — E11.9 TYPE 2 DIABETES MELLITUS WITHOUT COMPLICATION, WITHOUT LONG-TERM CURRENT USE OF INSULIN (HCC): ICD-10-CM

## 2018-08-29 DIAGNOSIS — N94.10 DYSPAREUNIA, FEMALE: ICD-10-CM

## 2018-08-29 DIAGNOSIS — G89.29 CHRONIC PAIN OF RIGHT KNEE: ICD-10-CM

## 2018-08-29 DIAGNOSIS — G25.81 RESTLESS LEG SYNDROME: ICD-10-CM

## 2018-08-29 DIAGNOSIS — M25.561 CHRONIC PAIN OF RIGHT KNEE: ICD-10-CM

## 2018-08-29 DIAGNOSIS — K21.00 GASTROESOPHAGEAL REFLUX DISEASE WITH ESOPHAGITIS: ICD-10-CM

## 2018-08-29 DIAGNOSIS — I10 ESSENTIAL HYPERTENSION: ICD-10-CM

## 2018-08-29 DIAGNOSIS — R30.0 DYSURIA: Primary | ICD-10-CM

## 2018-08-29 DIAGNOSIS — J30.2 OTHER SEASONAL ALLERGIC RHINITIS: ICD-10-CM

## 2018-08-29 LAB
BACTERIA UR QL AUTO: ABNORMAL /HPF
BILIRUB UR QL STRIP: NEGATIVE
CLARITY UR: CLEAR
COLOR UR: YELLOW
GLUCOSE UR STRIP-MCNC: NEGATIVE MG/DL
HGB UR QL STRIP.AUTO: NEGATIVE
HYALINE CASTS UR QL AUTO: ABNORMAL /LPF
KETONES UR QL STRIP: NEGATIVE
LEUKOCYTE ESTERASE UR QL STRIP.AUTO: NEGATIVE
NITRITE UR QL STRIP: NEGATIVE
PH UR STRIP.AUTO: 7 [PH] (ref 5–8)
PROT UR QL STRIP: NEGATIVE
RBC # UR: ABNORMAL /HPF
REF LAB TEST METHOD: ABNORMAL
SP GR UR STRIP: 1.01 (ref 1–1.03)
SQUAMOUS #/AREA URNS HPF: ABNORMAL /HPF
UROBILINOGEN UR QL STRIP: NORMAL
WBC UR QL AUTO: ABNORMAL /HPF

## 2018-08-29 PROCEDURE — 99215 OFFICE O/P EST HI 40 MIN: CPT | Performed by: NURSE PRACTITIONER

## 2018-08-29 PROCEDURE — 87086 URINE CULTURE/COLONY COUNT: CPT | Performed by: NURSE PRACTITIONER

## 2018-08-29 PROCEDURE — 81001 URINALYSIS AUTO W/SCOPE: CPT | Performed by: NURSE PRACTITIONER

## 2018-08-29 RX ORDER — PANTOPRAZOLE SODIUM 40 MG/1
40 TABLET, DELAYED RELEASE ORAL DAILY
Qty: 90 TABLET | Refills: 3 | Status: SHIPPED | OUTPATIENT
Start: 2018-08-29 | End: 2018-08-29 | Stop reason: SDUPTHER

## 2018-08-29 RX ORDER — BACLOFEN 10 MG/1
10 TABLET ORAL DAILY PRN
Qty: 90 TABLET | Refills: 3 | Status: SHIPPED | OUTPATIENT
Start: 2018-08-29 | End: 2018-11-29 | Stop reason: SDUPTHER

## 2018-08-29 RX ORDER — MONTELUKAST SODIUM 10 MG/1
10 TABLET ORAL NIGHTLY
Qty: 90 TABLET | Refills: 3 | Status: SHIPPED | OUTPATIENT
Start: 2018-08-29 | End: 2018-08-29 | Stop reason: SDUPTHER

## 2018-08-29 RX ORDER — CETIRIZINE HYDROCHLORIDE 10 MG/1
10 TABLET ORAL DAILY
Qty: 90 TABLET | Refills: 3 | Status: SHIPPED | OUTPATIENT
Start: 2018-08-29 | End: 2018-08-29 | Stop reason: SDUPTHER

## 2018-08-29 RX ORDER — TRAMADOL HYDROCHLORIDE 100 MG/1
200 TABLET, EXTENDED RELEASE ORAL DAILY
Qty: 60 TABLET | Refills: 2 | Status: SHIPPED | OUTPATIENT
Start: 2018-08-29 | End: 2018-11-29 | Stop reason: SDUPTHER

## 2018-08-29 RX ORDER — GABAPENTIN 100 MG/1
CAPSULE ORAL
Qty: 60 CAPSULE | Refills: 2 | Status: SHIPPED | OUTPATIENT
Start: 2018-08-29 | End: 2018-11-29 | Stop reason: SDUPTHER

## 2018-08-29 RX ORDER — LEVOTHYROXINE AND LIOTHYRONINE 9.5; 2.25 UG/1; UG/1
15 TABLET ORAL DAILY
Qty: 90 TABLET | Refills: 3 | Status: SHIPPED | OUTPATIENT
Start: 2018-08-29 | End: 2018-11-29

## 2018-08-29 RX ORDER — PANTOPRAZOLE SODIUM 40 MG/1
40 TABLET, DELAYED RELEASE ORAL DAILY
Qty: 90 TABLET | Refills: 3 | Status: SHIPPED | OUTPATIENT
Start: 2018-08-29 | End: 2018-11-27

## 2018-08-29 RX ORDER — BACLOFEN 10 MG/1
10 TABLET ORAL DAILY PRN
Qty: 90 TABLET | Refills: 3 | Status: SHIPPED | OUTPATIENT
Start: 2018-08-29 | End: 2018-08-29 | Stop reason: SDUPTHER

## 2018-08-29 RX ORDER — VERAPAMIL HYDROCHLORIDE 240 MG/1
240 TABLET, FILM COATED, EXTENDED RELEASE ORAL NIGHTLY
Qty: 90 TABLET | Refills: 3 | Status: SHIPPED | OUTPATIENT
Start: 2018-08-29 | End: 2018-11-29 | Stop reason: SDUPTHER

## 2018-08-29 RX ORDER — LEVOTHYROXINE AND LIOTHYRONINE 9.5; 2.25 UG/1; UG/1
15 TABLET ORAL DAILY
Qty: 90 TABLET | Refills: 3 | Status: SHIPPED | OUTPATIENT
Start: 2018-08-29 | End: 2018-08-29 | Stop reason: SDUPTHER

## 2018-08-29 RX ORDER — FLUOXETINE 10 MG/1
10 CAPSULE ORAL DAILY
Qty: 30 CAPSULE | Refills: 5 | Status: SHIPPED | OUTPATIENT
Start: 2018-08-29 | End: 2018-11-01

## 2018-08-29 RX ORDER — MONTELUKAST SODIUM 10 MG/1
10 TABLET ORAL NIGHTLY
Qty: 90 TABLET | Refills: 3 | Status: SHIPPED | OUTPATIENT
Start: 2018-08-29 | End: 2018-11-29 | Stop reason: SDUPTHER

## 2018-08-29 RX ORDER — ROPINIROLE 2 MG/1
2 TABLET, FILM COATED ORAL NIGHTLY
Qty: 90 TABLET | Refills: 3 | Status: SHIPPED | OUTPATIENT
Start: 2018-08-29 | End: 2018-11-29 | Stop reason: SDUPTHER

## 2018-08-29 RX ORDER — VERAPAMIL HYDROCHLORIDE 240 MG/1
240 TABLET, FILM COATED, EXTENDED RELEASE ORAL NIGHTLY
Qty: 90 TABLET | Refills: 3 | Status: SHIPPED | OUTPATIENT
Start: 2018-08-29 | End: 2018-08-29 | Stop reason: SDUPTHER

## 2018-08-29 RX ORDER — HYDROCODONE BITARTRATE AND ACETAMINOPHEN 7.5; 325 MG/1; MG/1
1 TABLET ORAL EVERY 6 HOURS PRN
Qty: 16 TABLET | Refills: 0 | Status: SHIPPED | OUTPATIENT
Start: 2018-08-29 | End: 2019-01-14 | Stop reason: SDUPTHER

## 2018-08-29 RX ORDER — CETIRIZINE HYDROCHLORIDE 10 MG/1
10 TABLET ORAL DAILY
Qty: 90 TABLET | Refills: 3 | Status: SHIPPED | OUTPATIENT
Start: 2018-08-29 | End: 2018-11-29 | Stop reason: SDUPTHER
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
## 2018-09-05 DIAGNOSIS — R30.0 DYSURIA: Primary | ICD-10-CM

## 2018-09-17 ENCOUNTER — TELEPHONE (OUTPATIENT)
Dept: FAMILY MEDICINE CLINIC | Facility: CLINIC | Age: 73
End: 2018-09-17

## 2018-09-17 NOTE — TELEPHONE ENCOUNTER
----- Message from ZHOU Villar sent at 9/12/2018  6:48 PM EDT -----  Tell pt her  urine cultures normal    I spoke with edi

## 2018-09-18 ENCOUNTER — TELEPHONE (OUTPATIENT)
Dept: FAMILY MEDICINE CLINIC | Facility: CLINIC | Age: 73
End: 2018-09-18

## 2018-09-18 ENCOUNTER — OFFICE VISIT (OUTPATIENT)
Dept: PSYCHIATRY | Facility: CLINIC | Age: 73
End: 2018-09-18

## 2018-09-18 DIAGNOSIS — F33.0 MILD EPISODE OF RECURRENT MAJOR DEPRESSIVE DISORDER (HCC): Primary | ICD-10-CM

## 2018-09-18 PROCEDURE — 90791 PSYCH DIAGNOSTIC EVALUATION: CPT | Performed by: SOCIAL WORKER

## 2018-09-18 NOTE — TELEPHONE ENCOUNTER
Please call the patient and let her know that I reviewed her recent x-rays.  She has no acute changes in her x-rays but does have degenerative disc disease or chronic arthritis changes.
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
## 2018-09-25 ENCOUNTER — OFFICE VISIT (OUTPATIENT)
Dept: PSYCHIATRY | Facility: CLINIC | Age: 73
End: 2018-09-25

## 2018-09-25 DIAGNOSIS — F33.0 MILD EPISODE OF RECURRENT MAJOR DEPRESSIVE DISORDER (HCC): Primary | ICD-10-CM

## 2018-09-25 PROCEDURE — 90837 PSYTX W PT 60 MINUTES: CPT | Performed by: SOCIAL WORKER

## 2018-09-25 NOTE — PROGRESS NOTES
PROGRESS NOTE  Data:  Tyler Cabello came in 2018 for her regularly scheduled therapy session starting at 10:45 AM and ending at 11:45 AM with Ian Diaz LCSW in the [facility] .  Continued building of rapport with patient and therapist in the therapeutic setting.  Patient talked about her life as a young child and the fact that when she  she was isolated.  Patient shared that her expectations of a spouse was that her  would provide financially for her family.  Yet when they relocated to [facility], her  was unable to do so.  Patient shared that they had to live with various family members because her  had no financial income.  Patient seen discovered she was pregnant.  Apparently both of her pregnancies were difficult deliveries and were  due to patient having such a small body frame she could not did not deliver her children naturally.  According to patient when her daughter was born she almost  as well as her daughter almost dying.  Patient shared that [facility].  However her  thought that patient needed to do all the changing and there was nothing wrong in regard to his actions and behaviors.  Patient will consider couples counseling and will discuss this with her .  She has not informed her  that she is seeing a therapist as of yet.  Patient was tearful throughout the session when sharing experiences.    (Scales based on 0 - 10 with 10 being the worst)  Depression: 6 Anxiety: 2   Distress: 4 Sleep: 0   Tasks Completed on Time: 1 Mood: 3   Number of Panic Attacks: 0 Appetite: 0     Mental Status Exam  Hygiene:  good  Dress:  casual  Attitude:  Cooperative  Motor Activity:  Appropriate  Speech:  Rapid and Rambling  Mood:  depressed  Affect:  depressed  Thought Processes:  Linear  Thought Content:  tangential  Suicidal Thoughts:  denies  Homicidal Thoughts:  denies  Crisis Safety  Plan: yes, to come to the emergency room.  Hallucinations:  denies      Clinical Maneuvering/Intervention:   Processing the above with patient.  Validating patient's emotions and feelings as well as her concerns.  Providing positive support therapy.  Exploring cultural dynamics in regard to patient's expectations and needs pertaining to course of treatment.  Allowed patient to freely discuss issues without interruption or judgment. Provided safe, confidential environment to facilitate the development of positive therapeutic relationship and encourage open, honest communication. Assisted patient in identifying risk factors which would indicate the need for higher level of care including thoughts to harm self or others and/or self-harming behavior and encouraged patient to contact this office, call 911, or present to the nearest emergency room should any of these events occur. Discussed crisis intervention services and means to access.  Patient adamantly and convincingly denies current suicidal or homicidal ideation or perceptual disturbance.    Prognosis: Fair with Ongoing Treatment     GAF: No impairment    Assessment cultural issues significant in regard to the relationship dynamics between patient and .    Diagnoses and all orders for this visit:    Mild episode of recurrent major depressive disorder (CMS/HCC)          Patient's Support Network Includes:  daughter and son    Plan     Continued cognitive therapy with patient biweekly.  We will incorporate family therapy when patient feels she is ready to meet with her  in this therapeutic setting.  Currently her  is recovering from open heart surgery.  Patient will adhere to medication regimen as prescribed and report any side effects. Patient will contact this office, call 911 or present to the nearest emergency room should suicidal or homicidal ideations occur. Provide Cognitive Behavioral Therapy and Solution Focused Therapy to improve  functioning, maintain stability, and avoid decompensation and the need for higher level of care.          Return in about 2 weeks (around 10/9/2018) for Next scheduled follow up.

## 2018-10-16 ENCOUNTER — OFFICE VISIT (OUTPATIENT)
Dept: PSYCHIATRY | Facility: CLINIC | Age: 73
End: 2018-10-16

## 2018-10-16 DIAGNOSIS — F32.0 CURRENT MILD EPISODE OF MAJOR DEPRESSIVE DISORDER WITHOUT PRIOR EPISODE (HCC): ICD-10-CM

## 2018-10-16 DIAGNOSIS — Z63.0 MARITAL RELATIONSHIP PROBLEM: Primary | ICD-10-CM

## 2018-10-16 PROCEDURE — 90837 PSYTX W PT 60 MINUTES: CPT | Performed by: SOCIAL WORKER

## 2018-10-16 SDOH — SOCIAL STABILITY - SOCIAL INSECURITY: PROBLEMS IN RELATIONSHIP WITH SPOUSE OR PARTNER: Z63.0

## 2018-10-17 ENCOUNTER — TELEPHONE (OUTPATIENT)
Dept: FAMILY MEDICINE CLINIC | Facility: CLINIC | Age: 73
End: 2018-10-17

## 2018-10-17 DIAGNOSIS — M50.30 DDD (DEGENERATIVE DISC DISEASE), CERVICAL: Primary | ICD-10-CM

## 2018-10-17 PROBLEM — F33.0 MILD EPISODE OF RECURRENT MAJOR DEPRESSIVE DISORDER (HCC): Status: ACTIVE | Noted: 2018-10-17

## 2018-10-17 NOTE — TELEPHONE ENCOUNTER
Please let patient know this has been placed in her chart.  Please schedule.      ----- Message from Nahed Martinez MA sent at 10/17/2018 11:03 AM EDT -----  Patient wants to know if you could put a referral in for her to see Dr. Quinones in [place].
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
## 2018-10-30 ENCOUNTER — OFFICE VISIT (OUTPATIENT)
Dept: PSYCHIATRY | Facility: CLINIC | Age: 73
End: 2018-10-30

## 2018-10-30 DIAGNOSIS — M25.562 CHRONIC PAIN OF BOTH KNEES: ICD-10-CM

## 2018-10-30 DIAGNOSIS — F33.0 MILD EPISODE OF RECURRENT MAJOR DEPRESSIVE DISORDER (HCC): ICD-10-CM

## 2018-10-30 DIAGNOSIS — G89.29 CHRONIC PAIN OF BOTH KNEES: ICD-10-CM

## 2018-10-30 DIAGNOSIS — M25.561 CHRONIC PAIN OF BOTH KNEES: ICD-10-CM

## 2018-10-30 PROCEDURE — 90837 PSYTX W PT 60 MINUTES: CPT | Performed by: SOCIAL WORKER
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
## 2018-11-01 ENCOUNTER — TELEPHONE (OUTPATIENT)
Dept: FAMILY MEDICINE CLINIC | Facility: CLINIC | Age: 73
End: 2018-11-01

## 2018-11-01 RX ORDER — FLUOXETINE HYDROCHLORIDE 20 MG/1
20 CAPSULE ORAL DAILY
Qty: 30 CAPSULE | Refills: 5 | Status: SHIPPED | OUTPATIENT
Start: 2018-11-01 | End: 2018-11-29 | Stop reason: SDUPTHER

## 2018-11-06 ENCOUNTER — OFFICE VISIT (OUTPATIENT)
Dept: FAMILY MEDICINE CLINIC | Facility: CLINIC | Age: 73
End: 2018-11-06

## 2018-11-06 VITALS
TEMPERATURE: 98.1 F | BODY MASS INDEX: 24.84 KG/M2 | WEIGHT: 135 LBS | HEART RATE: 82 BPM | HEIGHT: 62 IN | OXYGEN SATURATION: 98 % | SYSTOLIC BLOOD PRESSURE: 120 MMHG | DIASTOLIC BLOOD PRESSURE: 78 MMHG

## 2018-11-06 DIAGNOSIS — J01.10 ACUTE NON-RECURRENT FRONTAL SINUSITIS: Primary | ICD-10-CM

## 2018-11-06 PROCEDURE — 99213 OFFICE O/P EST LOW 20 MIN: CPT | Performed by: NURSE PRACTITIONER

## 2018-11-06 RX ORDER — IBUPROFEN 800 MG/1
800 TABLET ORAL EVERY 6 HOURS PRN
COMMUNITY
End: 2018-11-29 | Stop reason: SDUPTHER

## 2018-11-06 RX ORDER — AMOXICILLIN 875 MG/1
875 TABLET, COATED ORAL EVERY 12 HOURS SCHEDULED
Qty: 20 TABLET | Refills: 0 | Status: SHIPPED | OUTPATIENT
Start: 2018-11-06 | End: 2018-11-06 | Stop reason: SDUPTHER

## 2018-11-06 RX ORDER — AMOXICILLIN 875 MG/1
875 TABLET, COATED ORAL EVERY 12 HOURS SCHEDULED
Qty: 20 TABLET | Refills: 0 | Status: SHIPPED | OUTPATIENT
Start: 2018-11-06 | End: 2018-11-29

## 2018-11-06 NOTE — PROGRESS NOTES
"Cathryn Cabello is a 73 y.o. female.     Chief Complaint   Patient presents with   • Headache   • Sinus Pressure       History of Present Illness     Head congestion-for approx 3 weeks.  She reports intermittent headache but is having pressure with sound changes when she is \"talking\".  Sounds are decreased.  She does have similar symptoms when weather changes.  She is having some PND with clear to white production.  No SOA.  No fever but notices sensation of feeling flushed.   No GI symptoms.  She reports she has not taken any medication at home for her symptoms.  Not at goal.      The following portions of the patient's history were reviewed and updated as appropriate: allergies, current medications, past family history, past medical history, past social history, past surgical history and problem list.    Review of Systems   Constitutional: Negative for appetite change, fatigue and unexpected weight change.   HENT: Positive for congestion and sinus pressure. Negative for ear pain, nosebleeds, postnasal drip, rhinorrhea, sore throat, trouble swallowing and voice change.    Eyes: Negative for pain and visual disturbance.   Respiratory: Negative for cough, shortness of breath and wheezing.    Cardiovascular: Negative for chest pain and palpitations.   Gastrointestinal: Positive for constipation. Negative for abdominal pain, blood in stool and diarrhea.   Endocrine: Negative for cold intolerance and polydipsia.   Genitourinary: Negative for difficulty urinating, flank pain and hematuria.   Musculoskeletal: Positive for back pain and neck pain. Negative for arthralgias, gait problem, joint swelling and myalgias.   Skin: Negative for color change and rash.   Allergic/Immunologic: Negative.    Neurological: Positive for headaches. Negative for syncope and numbness.   Hematological: Negative.    Psychiatric/Behavioral: Negative for sleep disturbance and suicidal ideas.   All other systems reviewed and are " "negative.      Objective     /78   Pulse 82   Temp 98.1 °F (36.7 °C) (Temporal Artery )   Ht 157.5 cm (62\")   Wt 61.2 kg (135 lb)   LMP 07/08/1986 (LMP Unknown)   SpO2 98%   BMI 24.69 kg/m²      Physical Exam   Constitutional: She is oriented to person, place, and time. She appears well-developed and well-nourished. No distress.   HENT:   Head: Normocephalic and atraumatic.   Right Ear: External ear normal. Tympanic membrane is retracted. Tympanic membrane is not injected and not erythematous. A middle ear effusion is present.   Left Ear: External ear normal. Tympanic membrane is retracted. Tympanic membrane is not injected and not erythematous. A middle ear effusion is present.   Nose: Mucosal edema and rhinorrhea present. Right sinus exhibits frontal sinus tenderness. Right sinus exhibits no maxillary sinus tenderness. Left sinus exhibits frontal sinus tenderness. Left sinus exhibits no maxillary sinus tenderness.   Mouth/Throat: Oropharyngeal exudate and posterior oropharyngeal erythema present.   Eyes: Pupils are equal, round, and reactive to light. EOM are normal. No scleral icterus.   Neck: Normal range of motion. Neck supple. No JVD present. No thyromegaly present.   Cardiovascular: Normal rate, regular rhythm and normal heart sounds.    Pulmonary/Chest: Effort normal and breath sounds normal.   Abdominal: Soft. Bowel sounds are normal. There is no tenderness.   Neurological: She is alert and oriented to person, place, and time. No cranial nerve deficit. Coordination normal.   Skin: Skin is warm and dry.   Psychiatric: She has a normal mood and affect. Her behavior is normal. Judgment and thought content normal.   Vitals reviewed.      Assessment/Plan     Problem List Items Addressed This Visit     None      Visit Diagnoses     Acute non-recurrent frontal sinusitis    -  Primary    and sphenoid sinus tenderness    Relevant Medications    amoxicillin (AMOXIL) 875 MG tablet      Understands disease " processes and need for medications.  Understands reasons for urgent and emergent care.  Patient (& family) verbalized agreement for treatment plan.   Emotional support and active listening provided.  Patient provided time to verbalize feelings.  Instructed to complete all of antibiotics for acute illness.  Increase PO fluids, avoid/limit caffeine.  Do not save any of the meds for later use.  Rest PRN  Steam expectoration, warm compress to sinus, Saline PRN for moisture  Continue allergy meds as directed.  RTC PRN 3-5 days for worsening or non resolving symptoms          This document has been electronically signed by:  ZHOU Curiel, FNP-C

## 2018-11-20 ENCOUNTER — OFFICE VISIT (OUTPATIENT)
Dept: PSYCHIATRY | Facility: CLINIC | Age: 73
End: 2018-11-20

## 2018-11-20 DIAGNOSIS — Z63.0 MARITAL RELATIONSHIP PROBLEM: ICD-10-CM

## 2018-11-20 DIAGNOSIS — F33.0 MILD EPISODE OF RECURRENT MAJOR DEPRESSIVE DISORDER (HCC): Primary | ICD-10-CM

## 2018-11-20 PROCEDURE — 90837 PSYTX W PT 60 MINUTES: CPT | Performed by: SOCIAL WORKER

## 2018-11-20 SDOH — SOCIAL STABILITY - SOCIAL INSECURITY: PROBLEMS IN RELATIONSHIP WITH SPOUSE OR PARTNER: Z63.0

## 2018-11-20 NOTE — PROGRESS NOTES
PROGRESS NOTE  Data:  Tyler Cabello came in 11/20/2018 for her regularly scheduled therapy session starting at 1:45 PM and ending at 2:45 PM with Ian Diaz LCSW in the [facility] .  Patient's depression appears to be improved.  No tearfulness during session.  Patient talked thing about the cultural history of her childhood.  Patient displaying insight into the fact that her expectations of marrying were significantly different from the reality she found herself in when they relocated to [facility].  Patient appears to be extremely close with her to adult children.  She has raised her children incorporating the [place] culture with the American culture in which they grew up.  Patient focused on ensuring that her children respect their father even though the relationship between the 2 are strained at this time.  Patient describes herself as being an organized individual and was surprised to discover that her  was not at all after marrying.     (Scales based on 0 - 10 with 10 being the worst)  Depression: 2 Anxiety: 2   Distress: 2 Sleep: 2   Tasks Completed on Time: 7 Mood: 1   Number of Panic Attacks: 0 Appetite: 0     Mental Status Exam  Hygiene:  good  Dress:  casual  Attitude:  Cooperative  Motor Activity:  Appropriate  Speech:  Normal  Mood:  within normal limits  Affect:  calm and pleasant  Thought Processes:  Linear  Thought Content:  normal  Suicidal Thoughts:  denies  Homicidal Thoughts:  denies  Crisis Safety Plan: yes, to come to the emergency room.  Hallucinations:  denies      Clinical Maneuvering/Intervention:   Assessing the above with patient.  Validating patient's emotions and feelings as well as her concerns.  Use of positive support therapy.  Use of behavioral therapy aimed at assisting patient in anything insight into the fact that she is responsible for herself and only that.  Her  must make his own decisions as to making changes in their relationship.  Allowed patient  to freely discuss issues without interruption or judgment. Provided safe, confidential environment to facilitate the development of positive therapeutic relationship and encourage open, honest communication. Assisted patient in identifying risk factors which would indicate the need for higher level of care including thoughts to harm self or others and/or self-harming behavior and encouraged patient to contact this office, call 911, or present to the nearest emergency room should any of these events occur. Discussed crisis intervention services and means to access.  Patient adamantly and convincingly denies current suicidal or homicidal ideation or perceptual disturbance.    Prognosis: Good with Ongoing Treatment     GAF: No impairment    Assessment  there is a significant cultural barrier between patient and her  pertaining to expectations in their relationship    Diagnoses and all orders for this visit:    Mild episode of recurrent major depressive disorder (CMS/HCC)    Marital relationship problem        Patient's Support Network Includes:  children    Plan     Patient will adhere to medication regimen as prescribed and report any side effects. Patient will contact this office, call 911 or present to the nearest emergency room should suicidal or homicidal ideations occur. Provide Cognitive Behavioral Therapy and Solution Focused Therapy to improve functioning, maintain stability, and avoid decompensation and the need for higher level of care.          Return in about 3 weeks (around 12/11/2018).

## 2018-11-29 ENCOUNTER — OFFICE VISIT (OUTPATIENT)
Dept: FAMILY MEDICINE CLINIC | Facility: CLINIC | Age: 73
End: 2018-11-29

## 2018-11-29 VITALS
HEIGHT: 62 IN | HEART RATE: 88 BPM | OXYGEN SATURATION: 97 % | BODY MASS INDEX: 25.58 KG/M2 | TEMPERATURE: 99.1 F | SYSTOLIC BLOOD PRESSURE: 120 MMHG | WEIGHT: 139 LBS | DIASTOLIC BLOOD PRESSURE: 70 MMHG

## 2018-11-29 DIAGNOSIS — M54.42 CHRONIC BILATERAL LOW BACK PAIN WITH BILATERAL SCIATICA: ICD-10-CM

## 2018-11-29 DIAGNOSIS — K21.00 GASTROESOPHAGEAL REFLUX DISEASE WITH ESOPHAGITIS: ICD-10-CM

## 2018-11-29 DIAGNOSIS — M50.30 DDD (DEGENERATIVE DISC DISEASE), CERVICAL: ICD-10-CM

## 2018-11-29 DIAGNOSIS — M25.562 CHRONIC PAIN OF BOTH KNEES: Primary | ICD-10-CM

## 2018-11-29 DIAGNOSIS — M25.561 CHRONIC PAIN OF RIGHT KNEE: ICD-10-CM

## 2018-11-29 DIAGNOSIS — J30.2 OTHER SEASONAL ALLERGIC RHINITIS: ICD-10-CM

## 2018-11-29 DIAGNOSIS — G89.29 CHRONIC PAIN OF RIGHT KNEE: ICD-10-CM

## 2018-11-29 DIAGNOSIS — M25.561 CHRONIC PAIN OF BOTH KNEES: Primary | ICD-10-CM

## 2018-11-29 DIAGNOSIS — G89.29 CHRONIC BILATERAL LOW BACK PAIN WITH BILATERAL SCIATICA: ICD-10-CM

## 2018-11-29 DIAGNOSIS — G89.29 CHRONIC PAIN OF BOTH KNEES: Primary | ICD-10-CM

## 2018-11-29 DIAGNOSIS — E11.9 TYPE 2 DIABETES MELLITUS WITHOUT COMPLICATION, WITHOUT LONG-TERM CURRENT USE OF INSULIN (HCC): ICD-10-CM

## 2018-11-29 DIAGNOSIS — G25.81 RESTLESS LEG SYNDROME: ICD-10-CM

## 2018-11-29 DIAGNOSIS — F33.0 MILD EPISODE OF RECURRENT MAJOR DEPRESSIVE DISORDER (HCC): ICD-10-CM

## 2018-11-29 DIAGNOSIS — E03.9 ACQUIRED HYPOTHYROIDISM: ICD-10-CM

## 2018-11-29 DIAGNOSIS — J06.9 UPPER RESPIRATORY TRACT INFECTION, UNSPECIFIED TYPE: ICD-10-CM

## 2018-11-29 DIAGNOSIS — M54.41 CHRONIC BILATERAL LOW BACK PAIN WITH BILATERAL SCIATICA: ICD-10-CM

## 2018-11-29 DIAGNOSIS — I10 ESSENTIAL HYPERTENSION: ICD-10-CM

## 2018-11-29 PROCEDURE — 96372 THER/PROPH/DIAG INJ SC/IM: CPT | Performed by: NURSE PRACTITIONER

## 2018-11-29 PROCEDURE — 99214 OFFICE O/P EST MOD 30 MIN: CPT | Performed by: NURSE PRACTITIONER

## 2018-11-29 RX ORDER — CIPROFLOXACIN AND DEXAMETHASONE 3; 1 MG/ML; MG/ML
SUSPENSION/ DROPS AURICULAR (OTIC)
Qty: 7.5 ML | Refills: 0 | Status: SHIPPED | OUTPATIENT
Start: 2018-11-29 | End: 2018-12-06

## 2018-11-29 RX ORDER — BACLOFEN 10 MG/1
10 TABLET ORAL DAILY PRN
Qty: 90 TABLET | Refills: 3 | Status: SHIPPED | OUTPATIENT
Start: 2018-11-29 | End: 2019-01-14

## 2018-11-29 RX ORDER — FLUOXETINE HYDROCHLORIDE 20 MG/1
20 CAPSULE ORAL DAILY
Qty: 30 CAPSULE | Refills: 5 | Status: SHIPPED | OUTPATIENT
Start: 2018-11-29 | End: 2019-02-12 | Stop reason: SDUPTHER

## 2018-11-29 RX ORDER — ROPINIROLE 2 MG/1
2 TABLET, FILM COATED ORAL NIGHTLY
Qty: 90 TABLET | Refills: 3 | Status: SHIPPED | OUTPATIENT
Start: 2018-11-29 | End: 2019-12-06 | Stop reason: SDUPTHER

## 2018-11-29 RX ORDER — DEXAMETHASONE SODIUM PHOSPHATE 4 MG/ML
8 INJECTION, SOLUTION INTRA-ARTICULAR; INTRALESIONAL; INTRAMUSCULAR; INTRAVENOUS; SOFT TISSUE ONCE
Status: COMPLETED | OUTPATIENT
Start: 2018-11-29 | End: 2018-11-29

## 2018-11-29 RX ORDER — MONTELUKAST SODIUM 10 MG/1
10 TABLET ORAL NIGHTLY
Qty: 90 TABLET | Refills: 3 | Status: SHIPPED | OUTPATIENT
Start: 2018-11-29 | End: 2020-01-28 | Stop reason: SDUPTHER

## 2018-11-29 RX ORDER — PANTOPRAZOLE SODIUM 40 MG/1
40 TABLET, DELAYED RELEASE ORAL DAILY
Qty: 30 TABLET | Refills: 5 | Status: SHIPPED | OUTPATIENT
Start: 2018-11-29 | End: 2018-12-10 | Stop reason: SDUPTHER

## 2018-11-29 RX ORDER — IBUPROFEN 800 MG/1
800 TABLET ORAL EVERY 6 HOURS PRN
Qty: 90 TABLET | Refills: 3 | Status: SHIPPED | OUTPATIENT
Start: 2018-11-29 | End: 2018-12-10 | Stop reason: SDUPTHER

## 2018-11-29 RX ORDER — TRAMADOL HYDROCHLORIDE 100 MG/1
200 TABLET, EXTENDED RELEASE ORAL DAILY
Qty: 60 TABLET | Refills: 2 | Status: SHIPPED | OUTPATIENT
Start: 2018-11-29 | End: 2019-01-14 | Stop reason: SDUPTHER

## 2018-11-29 RX ORDER — CETIRIZINE HYDROCHLORIDE 10 MG/1
10 TABLET ORAL DAILY
Qty: 90 TABLET | Refills: 3 | Status: SHIPPED | OUTPATIENT
Start: 2018-11-29 | End: 2020-01-28 | Stop reason: SDUPTHER

## 2018-11-29 RX ORDER — GABAPENTIN 100 MG/1
CAPSULE ORAL
Qty: 60 CAPSULE | Refills: 2 | Status: SHIPPED | OUTPATIENT
Start: 2018-11-29 | End: 2019-01-14 | Stop reason: SDUPTHER

## 2018-11-29 RX ORDER — VERAPAMIL HYDROCHLORIDE 240 MG/1
240 TABLET, FILM COATED, EXTENDED RELEASE ORAL NIGHTLY
Qty: 90 TABLET | Refills: 3 | Status: SHIPPED | OUTPATIENT
Start: 2018-11-29 | End: 2020-01-28 | Stop reason: SDUPTHER

## 2018-11-29 RX ORDER — CETIRIZINE HYDROCHLORIDE 10 MG/1
10 TABLET ORAL DAILY
Qty: 90 TABLET | Refills: 3 | Status: SHIPPED | OUTPATIENT
Start: 2018-11-29 | End: 2018-11-29 | Stop reason: SDUPTHER

## 2018-11-29 RX ORDER — CEFTRIAXONE 1 G/1
1 INJECTION, POWDER, FOR SOLUTION INTRAMUSCULAR; INTRAVENOUS ONCE
Status: COMPLETED | OUTPATIENT
Start: 2018-11-29 | End: 2018-11-29

## 2018-11-29 RX ADMIN — DEXAMETHASONE SODIUM PHOSPHATE 8 MG: 4 INJECTION, SOLUTION INTRA-ARTICULAR; INTRALESIONAL; INTRAMUSCULAR; INTRAVENOUS; SOFT TISSUE at 15:24

## 2018-11-29 RX ADMIN — CEFTRIAXONE 1 G: 1 INJECTION, POWDER, FOR SOLUTION INTRAMUSCULAR; INTRAVENOUS at 15:23

## 2018-11-29 NOTE — ASSESSMENT & PLAN NOTE
Diabetes is improving with treatment.   Continue current treatment regimen.  Diabetes will be reassessed in 3 months.

## 2018-11-29 NOTE — PROGRESS NOTES
Subjective   Tyler Cabello is a 73 y.o. female.     Chief Complaint   Patient presents with   • URI   • Hyperlipidemia   • Hypertension   • Diabetes       History of Present Illness:    Patient presents today with uri symptoms for the past 24-48 hours.    Fever, body aches, chills, decreased appetite, fatigue and cough present.    Hypothyroidism-stable with [place] Thyroid     Essential hypertension-stable with verapamil     GERD-improved with Protonix.  She does have frequent exacerbation of symptoms during stressful periods or with certain foods.     Restless leg syndrome-improved with Requip 2 mg nightly.     Depression-under the care of behavioral health.  We have recently increased her Prozac up to 20 mg daily which does seem to be helping her depression.  Patient denies any thoughts of hurting self or others.  She continues to struggle with her personal relationship between her and her spouse.     Type 2 diabetes-patient is working on lifestyle changes and diet management.     Moderate persistent asthma/chronic allergies-patient has been to asthma and allergy for testing in the past.  She currently receives Zyrtec, Singulair and occasionally Benadryl over-the-counter.     Degenerative disc disease/osteoarthritis and chronic neck/shoulder pain/history of spinal fusion - patient rates her pain as 8 on a pain scale rating of 0-10.  Activity such as routine housework and chores increases her pain.  She become stiff with prolonged sitting/lying.  Patient does have difficulty sleeping at night due to joint pain.  She has been seen by neurology.  Radiology is on file.  Patient is no longer able to do the activities she wants enjoyed due to her joint pain.  She describes her pain as an aching and throbbing pain that is always there.  She does have periods of increased pain and stiffness.  She has no history of substance abuse or addiction.  Patient does report that her pain is decreased by approximately 50% with use of  tramadol and the occasional Norco when her pain is not managed or becomes severe.    Low back pain with bilateral sciatica/radiculopathy-patient reports that Neurontin twice daily is helpful for her symptoms.  She has no history of substance abuse or addiction.  Patient has a history of cervical spine surgery and fusion.  Neurontin is helpful for the tingling and burning sensation that she has in her upper and lower extremities.      The following portions of the patient's history were reviewed and updated as appropriate:  Allergies, current medications, past family history, past medical history, past social history, past surgical history and problem list.    Review of Systems   Constitutional: Positive for activity change, chills and fever. Negative for appetite change, fatigue and unexpected weight change.   HENT: Positive for ear pain, sinus pressure and sinus pain. Negative for congestion, nosebleeds, postnasal drip, rhinorrhea, sore throat, trouble swallowing and voice change.    Eyes: Negative for pain and visual disturbance.   Respiratory: Positive for cough. Negative for shortness of breath and wheezing.    Cardiovascular: Negative for chest pain and palpitations.   Gastrointestinal: Negative for abdominal pain, blood in stool, constipation and diarrhea.   Endocrine: Negative for cold intolerance and polydipsia.   Genitourinary: Negative for difficulty urinating, dysuria, flank pain and hematuria.   Musculoskeletal: Positive for arthralgias, back pain, myalgias and neck pain. Negative for gait problem, joint swelling and neck stiffness.   Skin: Negative for color change and rash.   Allergic/Immunologic: Positive for environmental allergies.   Neurological: Positive for headaches. Negative for seizures, syncope, speech difficulty and numbness.   Hematological: Negative.    Psychiatric/Behavioral: Negative for behavioral problems, self-injury, sleep disturbance and suicidal ideas.   All other systems reviewed  "and are negative.      Objective     /70   Pulse 88   Temp 99.1 °F (37.3 °C) (Tympanic)   Ht 157.5 cm (62\")   Wt 63 kg (139 lb)   LMP 07/08/1986 (LMP Unknown)   SpO2 97%   BMI 25.42 kg/m²   Orders Only on 08/29/2018   Component Date Value Ref Range Status   • Urine Culture 08/29/2018 10,000-20,000 CFU/mL Normal Urogenital Veronica   Final   • Color, UA 08/29/2018 Yellow  Yellow, Straw Final   • Appearance, UA 08/29/2018 Clear  Clear Final   • pH, UA 08/29/2018 7.0  5.0 - 8.0 Final   • Specific Gravity, UA 08/29/2018 1.009  1.005 - 1.030 Final   • Glucose, UA 08/29/2018 Negative  Negative Final   • Ketones, UA 08/29/2018 Negative  Negative Final   • Bilirubin, UA 08/29/2018 Negative  Negative Final   • Blood, UA 08/29/2018 Negative  Negative Final   • Protein, UA 08/29/2018 Negative  Negative Final   • Leuk Esterase, UA 08/29/2018 Negative  Negative Final   • Nitrite, UA 08/29/2018 Negative  Negative Final   • Urobilinogen, UA 08/29/2018 0.2 E.U./dL  0.2 - 1.0 E.U./dL Final   • RBC, UA 08/29/2018 6-12* None Seen, 0-2 /HPF Final   • WBC, UA 08/29/2018 0-2  None Seen, 0-2 /HPF Final   • Bacteria, UA 08/29/2018 None Seen  None Seen /HPF Final   • Squamous Epithelial Cells, UA 08/29/2018 0-2  None Seen, 0-2 /HPF Final   • Hyaline Casts, UA 08/29/2018 None Seen  None Seen /LPF Final   • Methodology 08/29/2018 Automated Microscopy   Final       Physical Exam   Constitutional: She is oriented to person, place, and time. Vital signs are normal. She appears well-developed and well-nourished. She has a sickly appearance.   Appears acutely ill.    HENT:   Head: Normocephalic.   Right Ear: Hearing normal. No lacerations. No drainage or swelling. No foreign bodies. No mastoid tenderness. A middle ear effusion is present.   Left Ear: Hearing normal. No lacerations. There is tenderness. No drainage or swelling. No foreign bodies. No mastoid tenderness. Tympanic membrane is erythematous. A middle ear effusion is present. "   Nose: Mucosal edema and rhinorrhea present. No nose lacerations, sinus tenderness or nasal deformity. No epistaxis.  No foreign bodies.   Mouth/Throat: Uvula is midline. Oropharyngeal exudate and posterior oropharyngeal erythema present. No tonsillar abscesses.   TM's are cloudy bilateral   Eyes: EOM are normal. Pupils are equal, round, and reactive to light.   Neck: Normal range of motion. Neck supple. No thyromegaly present.   Cardiovascular: Normal rate, regular rhythm, normal heart sounds and intact distal pulses.   Pulmonary/Chest: Effort normal and breath sounds normal. No respiratory distress.   Cough noted    Abdominal: Soft. Bowel sounds are normal. She exhibits no distension. There is no tenderness. There is no guarding.   Musculoskeletal:        Right shoulder: She exhibits crepitus.        Left shoulder: She exhibits crepitus.        Cervical back: She exhibits decreased range of motion, tenderness, pain and spasm.        Lumbar back: She exhibits tenderness and spasm.   Positive temples sign bilateral upper extremities, negative Homans sign bilateral lower extremities   Lymphadenopathy:     She has cervical adenopathy.        Right cervical: Superficial cervical adenopathy present. No deep cervical adenopathy present.       Left cervical: Superficial cervical adenopathy present. No deep cervical adenopathy present.   Neurological: She is alert and oriented to person, place, and time. She has normal reflexes.   Skin: Skin is warm and dry. No rash noted.   Psychiatric: She has a normal mood and affect. Her speech is normal and behavior is normal. Judgment and thought content normal. Cognition and memory are normal.       Assessment/Plan     Problem List Items Addressed This Visit        Cardiovascular and Mediastinum    Essential hypertension    Relevant Medications    verapamil SR (CALAN-SR) 240 MG CR tablet       Digestive    Gastroesophageal reflux disease with esophagitis    Relevant Medications     pantoprazole (PROTONIX) 40 MG EC tablet       Endocrine    Type 2 diabetes mellitus without complication (CMS/HCC)    Current Assessment & Plan     Diabetes is improving with treatment.   Continue current treatment regimen.  Diabetes will be reassessed in 3 months.         Acquired hypothyroidism       Nervous and Auditory    Chronic bilateral low back pain with bilateral sciatica       Musculoskeletal and Integument    Chronic pain of right knee    Relevant Medications    traMADol ER (ULTRAM-ER) 100 MG 24 hr tablet    gabapentin (NEURONTIN) 100 MG capsule    Chronic pain of both knees - Primary    Relevant Orders    MRI Knee Left Without Contrast    MRI Knee Right Without Contrast    DDD (degenerative disc disease), cervical    Relevant Medications    traMADol ER (ULTRAM-ER) 100 MG 24 hr tablet    gabapentin (NEURONTIN) 100 MG capsule    Other Relevant Orders    MRI Lumbar Spine Without Contrast    MRI Cervical Spine Without Contrast       Other    Mild episode of recurrent major depressive disorder (CMS/HCC)    Current Assessment & Plan     Psychological condition is improving with treatment.  Continue current treatment regimen.  Psychological condition  will be reassessed at the next regular appointment.         Relevant Medications    FLUoxetine (PROZAC) 20 MG capsule      Other Visit Diagnoses     Restless leg syndrome        Relevant Medications    rOPINIRole (REQUIP) 2 MG tablet    baclofen (LIORESAL) 10 MG tablet    Other seasonal allergic rhinitis        stable    Relevant Medications    montelukast (SINGULAIR) 10 MG tablet    cetirizine (zyrTEC) 10 MG tablet    Upper respiratory tract infection, unspecified type        Relevant Medications    cefTRIAXone (ROCEPHIN) injection 1 g (Completed)    dexamethasone (DECADRON) injection 8 mg (Completed)        We will schedule patient back next week for bilateral knee joint injections as recommended/requested.  Rocephin 1 g IM today and dexamethasone 8 mg IM today,  tolerated well.  Fluids, rest, symptomatic treatment advised. Steam therapy. Dispose of tooth bush after 24 hours of starting antibiotics if ordered. Complete antibiotics as ordered.  Discussed possible side effects/interactions of medication. Discussed symptoms to report as well as reasons to seek urgent or emergent medical attention. Understanding stated.   Recommend follow up in 2-3 days if not improved, sooner if needed.   Start Ciprodex ear drops 4 drops twice daily to both ears.           Patient's Body mass index is 25.42 kg/m². BMI is above normal parameters. Recommendations include: exercise counseling and nutrition counseling.      I have discussed diagnosis in detail today allowing time for questions and answers. Patient is aware of reasons to seek urgent or emergent medical care as well as reasons to return to the clinic for evaluation. Possible side effects, interactions and progression of symptoms discussed as well. Patient / family states understanding.   Emotional support and active listening provided.     Refill routine medication.  [facility]  Her controlled medications along with her Singulair and Prozac are sent to the local [facility].    Proper body mechanics has been reviewed and discussed today.      As part of this patient's treatment plan they are being prescribed controlled substance/substances with potential for abuse. This patient has been made aware of the appropriate use of such medications, including potential risk of somnolence, limited ability to drive and / or work safely, and potential for overdose. It has also been made clear that these medications are for use by this patient only, without concomitant use of alcohol or other substances unless prescribed/advised by medical provider. Patient has completed controlled substance agreement detailing terms of continued prescribing of controlled substances including monitoring Diaz reports,  drug screens and pill counts. The patient was asked and states they are not receiving narcotic medication from any there provider or any provider that this office has not been made aware of. History and physical exam exhibit continued safe and appropriate use of controlled substances.   Goal: Improved quality of life and reduction in pain as evidenced by pt report.   Diaz # [id number] has been reviewed as consistent.  UDS is on file.   Controlled medications including tramadol and Neurontin have been E scribed.  Patient has been instructed and counseled regarding opioid misuse and risk of addiction.  We have discussed proper storage and disposal of controlled medication.    Follow-up next week for bilateral knee injection, sooner if needed.       Current Outpatient Medications:   •  baclofen (LIORESAL) 10 MG tablet, Take 1 tablet by mouth Daily As Needed for Muscle Spasms., Disp: 90 tablet, Rfl: 3  •  cetirizine (zyrTEC) 10 MG tablet, Take 1 tablet by mouth Daily., Disp: 90 tablet, Rfl: 3  •  FLUoxetine (PROZAC) 20 MG capsule, Take 1 capsule by mouth Daily., Disp: 30 capsule, Rfl: 5  •  gabapentin (NEURONTIN) 100 MG capsule, 1 tablet twice daily, Disp: 60 capsule, Rfl: 2  •  HYDROcodone-acetaminophen (NORCO) 7.5-325 MG per tablet, Take 1 tablet by mouth Every 6 (Six) Hours As Needed for Moderate Pain ., Disp: 16 tablet, Rfl: 0  •  ibuprofen (ADVIL,MOTRIN) 800 MG tablet, Take 1 tablet by mouth Every 6 (Six) Hours As Needed for Mild Pain ., Disp: 90 tablet, Rfl: 3  •  montelukast (SINGULAIR) 10 MG tablet, Take 1 tablet by mouth Every Night., Disp: 90 tablet, Rfl: 3  •  rOPINIRole (REQUIP) 2 MG tablet, Take 1 tablet by mouth Every Night. Take 1 hour before bedtime., Disp: 90 tablet, Rfl: 3  •  traMADol ER (ULTRAM-ER) 100 MG 24 hr tablet, Take 2 tablets by mouth Daily., Disp: 60 tablet, Rfl: 2  •  verapamil SR (CALAN-SR) 240 MG CR tablet, Take 1 tablet by mouth Every Night., Disp: 90 tablet, Rfl: 3  •   ciprofloxacin-dexamethasone (CIPRODEX) 0.3-0.1 % otic suspension, 4 drops in affected ear (s) twice daily, Disp: 7.5 mL, Rfl: 0  •  pantoprazole (PROTONIX) 40 MG EC tablet, Take 1 tablet by mouth Daily., Disp: 30 tablet, Rfl: 5  No current facility-administered medications for this visit.       Dictated utilizing Dragon dictation        This document has been electronically signed by:  ZHOU Moncada, NP-C

## 2018-12-03 ENCOUNTER — OFFICE VISIT (OUTPATIENT)
Dept: FAMILY MEDICINE CLINIC | Facility: CLINIC | Age: 73
End: 2018-12-03

## 2018-12-03 VITALS
DIASTOLIC BLOOD PRESSURE: 70 MMHG | BODY MASS INDEX: 25.58 KG/M2 | WEIGHT: 139 LBS | HEART RATE: 82 BPM | HEIGHT: 62 IN | OXYGEN SATURATION: 62 % | SYSTOLIC BLOOD PRESSURE: 130 MMHG | TEMPERATURE: 98.6 F

## 2018-12-03 DIAGNOSIS — M25.562 CHRONIC PAIN OF BOTH KNEES: Primary | ICD-10-CM

## 2018-12-03 DIAGNOSIS — M25.561 CHRONIC PAIN OF BOTH KNEES: Primary | ICD-10-CM

## 2018-12-03 DIAGNOSIS — G89.29 CHRONIC PAIN OF BOTH KNEES: Primary | ICD-10-CM

## 2018-12-03 PROCEDURE — 20610 DRAIN/INJ JOINT/BURSA W/O US: CPT | Performed by: NURSE PRACTITIONER

## 2018-12-03 PROCEDURE — 99213 OFFICE O/P EST LOW 20 MIN: CPT | Performed by: NURSE PRACTITIONER

## 2018-12-03 NOTE — PROGRESS NOTES
"Cathryn Cabello is a 73 y.o. female.     Chief Complaint   Patient presents with   • knee inj       History of Present Illness:    Osteoarthritis in both knees. Requesting a joint injection in both knees. Has had in the past and tolerated well. Pain is described as stiffness and crepitus.           The following portions of the patient's history were reviewed and updated as appropriate:  Allergies, current medications, past family history, past medical history, past social history, past surgical history and problem list.    Review of Systems   Constitutional: Negative for appetite change, fatigue and unexpected weight change.   HENT: Negative for congestion, ear pain, nosebleeds, postnasal drip, rhinorrhea, sore throat, trouble swallowing and voice change.    Eyes: Negative for pain and visual disturbance.   Respiratory: Negative for cough, shortness of breath and wheezing.    Cardiovascular: Negative for chest pain and palpitations.   Gastrointestinal: Negative for abdominal pain, blood in stool, constipation and diarrhea.   Endocrine: Negative for cold intolerance and polydipsia.   Genitourinary: Negative for difficulty urinating, flank pain and hematuria.   Musculoskeletal: Positive for arthralgias, gait problem and joint swelling. Negative for back pain and myalgias.   Skin: Negative for color change and rash.   Allergic/Immunologic: Positive for environmental allergies.   Neurological: Negative for syncope, numbness and headaches.   Hematological: Negative.    Psychiatric/Behavioral: Negative for sleep disturbance and suicidal ideas.   All other systems reviewed and are negative.      Objective     /70   Pulse 82   Temp 98.6 °F (37 °C) (Tympanic)   Ht 157.5 cm (62\")   Wt 63 kg (139 lb)   LMP 07/08/1986 (LMP Unknown)   SpO2 (!) 62%   BMI 25.42 kg/m²   Orders Only on 08/29/2018   Component Date Value Ref Range Status   • Urine Culture 08/29/2018 10,000-20,000 CFU/mL Normal Urogenital Veronica  "  Final   • Color, UA 08/29/2018 Yellow  Yellow, Straw Final   • Appearance, UA 08/29/2018 Clear  Clear Final   • pH, UA 08/29/2018 7.0  5.0 - 8.0 Final   • Specific Gravity, UA 08/29/2018 1.009  1.005 - 1.030 Final   • Glucose, UA 08/29/2018 Negative  Negative Final   • Ketones, UA 08/29/2018 Negative  Negative Final   • Bilirubin, UA 08/29/2018 Negative  Negative Final   • Blood, UA 08/29/2018 Negative  Negative Final   • Protein, UA 08/29/2018 Negative  Negative Final   • Leuk Esterase, UA 08/29/2018 Negative  Negative Final   • Nitrite, UA 08/29/2018 Negative  Negative Final   • Urobilinogen, UA 08/29/2018 0.2 E.U./dL  0.2 - 1.0 E.U./dL Final   • RBC, UA 08/29/2018 6-12* None Seen, 0-2 /HPF Final   • WBC, UA 08/29/2018 0-2  None Seen, 0-2 /HPF Final   • Bacteria, UA 08/29/2018 None Seen  None Seen /HPF Final   • Squamous Epithelial Cells, UA 08/29/2018 0-2  None Seen, 0-2 /HPF Final   • Hyaline Casts, UA 08/29/2018 None Seen  None Seen /LPF Final   • Methodology 08/29/2018 Automated Microscopy   Final       Physical Exam   Constitutional: She is oriented to person, place, and time. She appears well-developed and well-nourished. No distress.   HENT:   Mouth/Throat: No oropharyngeal exudate.   Eyes: Pupils are equal, round, and reactive to light. Right eye exhibits no discharge. Left eye exhibits no discharge.   Neck: Neck supple. No thyromegaly present.   Cardiovascular: Normal rate and regular rhythm.   Pulmonary/Chest: Effort normal and breath sounds normal.   Abdominal: Soft. Bowel sounds are normal. There is no tenderness.   Musculoskeletal:        Right knee: She exhibits decreased range of motion. Tenderness found. Lateral joint line tenderness noted.        Left knee: She exhibits decreased range of motion. Tenderness found. Lateral joint line tenderness noted.   Procedure: Large joint injection of right and left knee intra-articularly today  The procedure risks and benefits were explained to patient and  patient gave verbal consent to have the procedure performed.  Indication:  Bilateral knee  osteoarthritis  Provider: ZHOU dumont  Description: The right and left knee  was prepped and draped in sterile fashion.  An injection was given into the joint intramuscularly using 3 cc of 1% lidocaine, 1 cc Decadron, and 1 cc of Kenalog was injected in each knee.  Pressure was held and sterile dressings were placed.  No consultation  Assessment blood loss: Minimal  Patient tolerated the procedure well.     1% lidocaine 20 mg per 2 mL NDC # 72205-381-73 Batch: [id number] and  Kenalog 40 mg per 1 mL NDC #0513-4692-83   Decadron 4 mg per 1 mL NDC #23297-207-61     Noted very limited joint space in the left knee.   Lymphadenopathy:     She has no cervical adenopathy.   Neurological: She is alert and oriented to person, place, and time.   Skin: Skin is warm and dry. Capillary refill takes less than 2 seconds. She is not diaphoretic.   Psychiatric: She has a normal mood and affect. Her behavior is normal. Judgment and thought content normal.   Vitals reviewed.      Assessment/Plan     Problem List Items Addressed This Visit        Musculoskeletal and Integument    Chronic pain of both knees - Primary    Overview     Osteoarthritis  I have discussed the procedure benefits versus risks including those of bleeding, infection, injury and allergic reaction.  We have also discussed alternative treatment options.  Patient questions were sought and answered.  Patient states understanding and voices consent for procedure today.             I have discussed the procedure benefits versus risks including those of bleeding, infection, injury and allergic reaction.  We have also discussed alternative treatment options.  Patient questions were sought and answered.  Patient states understanding and voices consent for procedure today.    Tolerated bilateral knee joint injections well.       Patient's Body mass index is 25.42 kg/m². BMI  is within normal parameters. No follow-up required.        I have discussed diagnosis in detail today allowing time for questions and answers. Patient is aware of reasons to seek urgent or emergent medical care as well as reasons to return to the clinic for evaluation. Possible side effects, interactions and progression of symptoms discussed as well. Patient / family states understanding.   Emotional support and active listening provided.     Recommend patient follow up in approximately 6 weeks.  We will at that time attempt to inject her left knee again with steroid joint injection due to limited joint space.  Within 6 weeks today's injection should have helped some with inflammation and pain allowing better joint space accessibility.      Dictated utilizing Dragon dictation    This document has been electronically signed by:  ZHOU Moncada, NP-C

## 2018-12-10 ENCOUNTER — TELEPHONE (OUTPATIENT)
Dept: FAMILY MEDICINE CLINIC | Facility: CLINIC | Age: 73
End: 2018-12-10

## 2018-12-10 RX ORDER — PANTOPRAZOLE SODIUM 40 MG/1
40 TABLET, DELAYED RELEASE ORAL DAILY
Qty: 30 TABLET | Refills: 0 | Status: SHIPPED | OUTPATIENT
Start: 2018-12-10 | End: 2018-12-10 | Stop reason: SDUPTHER

## 2018-12-10 RX ORDER — IBUPROFEN 800 MG/1
800 TABLET ORAL EVERY 6 HOURS PRN
Qty: 90 TABLET | Refills: 0 | Status: SHIPPED | OUTPATIENT
Start: 2018-12-10 | End: 2019-07-23 | Stop reason: SDUPTHER

## 2018-12-10 NOTE — TELEPHONE ENCOUNTER
Left message with patient's .   ----- Message from ZHOU Villar sent at 12/10/2018  1:58 PM EST -----  Done , notify pt .       ----- Message -----  From: Caitie Cummings RegSched Rep  Sent: 12/10/2018   1:26 PM  To: ZHOU Villar, #    [facility] ibu [phone]  But need 1 mo supply sent to [facility] in [place] for now

## 2018-12-11 ENCOUNTER — OFFICE VISIT (OUTPATIENT)
Dept: PSYCHIATRY | Facility: CLINIC | Age: 73
End: 2018-12-11

## 2018-12-11 DIAGNOSIS — Z63.0 MARITAL RELATIONSHIP PROBLEM: ICD-10-CM

## 2018-12-11 DIAGNOSIS — F33.0 MILD EPISODE OF RECURRENT MAJOR DEPRESSIVE DISORDER (HCC): Primary | ICD-10-CM

## 2018-12-11 PROCEDURE — 90837 PSYTX W PT 60 MINUTES: CPT | Performed by: SOCIAL WORKER

## 2018-12-11 SDOH — SOCIAL STABILITY - SOCIAL INSECURITY: PROBLEMS IN RELATIONSHIP WITH SPOUSE OR PARTNER: Z63.0

## 2018-12-11 NOTE — PROGRESS NOTES
PROGRESS NOTE  Data:  Tyler Cabello came in 12/11/2018 for her regularly scheduled therapy session starting at 1:15PM  and ending at 2:15PM with Ian Diaz LCSW in the [facility] .  Patient running late for her 1 PM appointment.  Presenting in a better mood, joking at times.  Patient acknowledges that Thanksgiving was a difficult time and there was conflict between her son and .  She admits that she status the signs/incidents in which her  is being more considerate her.  Patient talked about finding it extremely difficult when she experienced the empty nest syndrome after both of her children left home.  According to patient she actually had thoughts associated with suicidal ideations and death wishes.  She never acted on these thoughts however.  Patient trying to stay more positive and to focus on improving self.  She finds it difficult to except that her  will never change.    (Scales based on 0 - 10 with 10 being the worst)  Depression: 3 Anxiety: 0   Distress: 3 Sleep: 2   Tasks Completed on Time: 5 Mood: 3   Number of Panic Attacks: 0 Appetite: 0     Mental Status Exam  Hygiene:  good  Dress:  casual  Attitude:  Cooperative  Motor Activity:  Appropriate  Speech:  Normal and Rambling  Mood:  within normal limits  Affect:  calm and pleasant  Thought Processes:  Linear  Thought Content:  normal  Suicidal Thoughts:  denies  Homicidal Thoughts:  denies  Crisis Safety Plan: yes, to come to the emergency room.  Hallucinations:  denies      Clinical Maneuvering/Intervention:   Processing the above with patient.  Validating patient's emotions and feelings as well as her concerns.  Providing positive support therapy.  Applying reframing to patient's presentation with the focus being on patient's positive traits such as her strength and determination instead of the negative connotations that she verbalized pertaining to her .  Reinforcing the importance that patient focus on  self.  Allowed patient to freely discuss issues without interruption or judgment. Provided safe, confidential environment to facilitate the development of positive therapeutic relationship and encourage open, honest communication. Assisted patient in identifying risk factors which would indicate the need for higher level of care including thoughts to harm self or others and/or self-harming behavior and encouraged patient to contact this office, call 911, or present to the nearest emergency room should any of these events occur. Discussed crisis intervention services and means to access.  Patient adamantly and convincingly denies current suicidal or homicidal ideation or perceptual disturbance.    Prognosis: Good with Ongoing Treatment     GAF: No impairment    Assessment marital relationships continue be the primary factors associated with patient's depressive mood.    Diagnoses and all orders for this visit:    Mild episode of recurrent major depressive disorder (CMS/HCC)    Marital relationship problem      Patient's Support Network Includes:  children    Plan   Next appointment to be after the holidays.  At which time we will reevaluate therapy needs. Patient will adhere to medication regimen as prescribed and report any side effects. Patient will contact this office, call 911 or present to the nearest emergency room should suicidal or homicidal ideations occur. Provide Cognitive Behavioral Therapy and Solution Focused Therapy to improve functioning, maintain stability, and avoid decompensation and the need for higher level of care.          Return in about 4 weeks (around 1/8/2019).

## 2018-12-12 ENCOUNTER — HOSPITAL ENCOUNTER (OUTPATIENT)
Dept: MRI IMAGING | Facility: HOSPITAL | Age: 73
End: 2018-12-12

## 2018-12-12 ENCOUNTER — APPOINTMENT (OUTPATIENT)
Dept: MRI IMAGING | Facility: HOSPITAL | Age: 73
End: 2018-12-12

## 2018-12-13 RX ORDER — PANTOPRAZOLE SODIUM 40 MG/1
40 TABLET, DELAYED RELEASE ORAL DAILY
Qty: 90 TABLET | Refills: 0 | Status: SHIPPED | OUTPATIENT
Start: 2018-12-13 | End: 2019-07-23 | Stop reason: SDUPTHER

## 2018-12-15 ENCOUNTER — HOSPITAL ENCOUNTER (OUTPATIENT)
Dept: MRI IMAGING | Facility: HOSPITAL | Age: 73
Discharge: HOME | End: 2018-12-15

## 2018-12-15 ENCOUNTER — HOSPITAL ENCOUNTER (OUTPATIENT)
Dept: MRI IMAGING | Facility: HOSPITAL | Age: 73
Discharge: HOME | End: 2018-12-15
Admitting: NURSE PRACTITIONER

## 2018-12-15 DIAGNOSIS — G89.29 CHRONIC PAIN OF BOTH KNEES: ICD-10-CM

## 2018-12-15 DIAGNOSIS — M25.561 CHRONIC PAIN OF BOTH KNEES: ICD-10-CM

## 2018-12-15 DIAGNOSIS — M50.30 DDD (DEGENERATIVE DISC DISEASE), CERVICAL: ICD-10-CM

## 2018-12-15 DIAGNOSIS — M25.562 CHRONIC PAIN OF BOTH KNEES: ICD-10-CM

## 2018-12-15 PROCEDURE — 72141 MRI NECK SPINE W/O DYE: CPT

## 2018-12-15 PROCEDURE — 73721 MRI JNT OF LWR EXTRE W/O DYE: CPT | Performed by: RADIOLOGY

## 2018-12-15 PROCEDURE — 72148 MRI LUMBAR SPINE W/O DYE: CPT | Performed by: RADIOLOGY

## 2018-12-15 PROCEDURE — 73721 MRI JNT OF LWR EXTRE W/O DYE: CPT

## 2018-12-15 PROCEDURE — 72141 MRI NECK SPINE W/O DYE: CPT | Performed by: RADIOLOGY

## 2018-12-15 PROCEDURE — 72148 MRI LUMBAR SPINE W/O DYE: CPT

## 2019-01-11 ENCOUNTER — OFFICE VISIT (OUTPATIENT)
Dept: PSYCHIATRY | Facility: CLINIC | Age: 74
End: 2019-01-11

## 2019-01-11 DIAGNOSIS — F33.0 MILD EPISODE OF RECURRENT MAJOR DEPRESSIVE DISORDER (HCC): Primary | ICD-10-CM

## 2019-01-11 PROCEDURE — 90834 PSYTX W PT 45 MINUTES: CPT | Performed by: SOCIAL WORKER

## 2019-01-11 NOTE — PROGRESS NOTES
PROGRESS NOTE  Data:  Tyler Cabello came in 1/11/2019 for her regularly scheduled therapy session starting at 1 PM and ending at the 1:45 PM with Ian Diaz, [facility] .  Reporting improvement in her relationship with her .  Apparently there was an incident in which his blood pressure was escalating and she feared that something might lead to his death.  According to patient it affected her as well as her .  In fact her  thanked her for being concerned and helping him explained to his PCP what problems they were having at home.  Patient admits that at one point she stopped taking the Prozac but soon discovered that she needed to be back on it.  Patient agrees that she will not make that decision without discussing it with her primary care provider in the future.  Tears present during this session.  That is a significant improvement from all previous psychotherapy appointments     (Scales based on 0 - 10 with 10 being the worst)  Depression: 3 Anxiety: 2   Distress: 2 Sleep: 2   Tasks Completed on Time: 3 Mood: 1   Number of Panic Attacks: 0 Appetite: 0     Mental Status Exam  Hygiene:  good  Dress:  casual  Attitude:  Cooperative  Motor Activity:  Appropriate  Speech:  Rapid  Mood:  within normal limits  Affect:  calm and pleasant  Thought Processes:  Goal directed  Thought Content:  normal  Suicidal Thoughts:  denies  Homicidal Thoughts:  denies  Crisis Safety Plan: yes, to come to the emergency room.  Hallucinations:  denies      Clinical Maneuvering/Intervention:   Processing the above with patient.  Validating patient's emotions and feelings as well as her concerns.  Providing positive support therapy.  Patient shared that her belief in her higher power, God, has helped her to be patient with her .  She now reports that she does see a glimmer of change pertaining to her 's personality and belief system.  Allowed patient to freely discuss issues without  interruption or judgment. Provided safe, confidential environment to facilitate the development of positive therapeutic relationship and encourage open, honest communication. Assisted patient in identifying risk factors which would indicate the need for higher level of care including thoughts to harm self or others and/or self-harming behavior and encouraged patient to contact this office, call 911, or present to the nearest emergency room should any of these events occur. Discussed crisis intervention services and means to access.  Patient adamantly and convincingly denies current suicidal or homicidal ideation or perceptual disturbance.    Prognosis: Good with Ongoing Treatment     GAF: No impairment    Assessment marital strain for the past 40 years contributed to patient feeling hopeless and helpless.  Difficulty communicating not only due to the cultural difference between herself and her , but the fact that her  held onto firm belief that he was the head of the household and did not need to discuss issues and/or feelings with his wife.    Diagnoses and all orders for this visit:    Mild episode of recurrent major depressive disorder (CMS/HCC)        Patient's Support Network Includes:  , daughter and son    Plan     Patient will adhere to medication regimen as prescribed and report any side effects. Patient will contact this office, call 911 or present to the nearest emergency room should suicidal or homicidal ideations occur. Provide Cognitive Behavioral Therapy and Solution Focused Therapy to improve functioning, maintain stability, and avoid decompensation and the need for higher level of care.          Return in about 6 weeks (around 2/22/2019).

## 2019-01-14 ENCOUNTER — PROCEDURE VISIT (OUTPATIENT)
Dept: FAMILY MEDICINE CLINIC | Facility: CLINIC | Age: 74
End: 2019-01-14

## 2019-01-14 VITALS
BODY MASS INDEX: 26.13 KG/M2 | OXYGEN SATURATION: 97 % | WEIGHT: 142 LBS | DIASTOLIC BLOOD PRESSURE: 80 MMHG | HEIGHT: 62 IN | HEART RATE: 86 BPM | SYSTOLIC BLOOD PRESSURE: 140 MMHG | TEMPERATURE: 99.2 F

## 2019-01-14 DIAGNOSIS — M50.30 DDD (DEGENERATIVE DISC DISEASE), CERVICAL: ICD-10-CM

## 2019-01-14 DIAGNOSIS — M25.561 CHRONIC PAIN OF RIGHT KNEE: ICD-10-CM

## 2019-01-14 DIAGNOSIS — G89.29 CHRONIC PAIN OF RIGHT KNEE: ICD-10-CM

## 2019-01-14 PROCEDURE — 99214 OFFICE O/P EST MOD 30 MIN: CPT | Performed by: NURSE PRACTITIONER

## 2019-01-14 RX ORDER — METHOCARBAMOL 750 MG/1
750 TABLET, FILM COATED ORAL 3 TIMES DAILY PRN
Qty: 90 TABLET | Refills: 5 | Status: ON HOLD | OUTPATIENT
Start: 2019-01-14 | End: 2019-11-27

## 2019-01-14 RX ORDER — GABAPENTIN 100 MG/1
CAPSULE ORAL
Qty: 60 CAPSULE | Refills: 2 | Status: SHIPPED | OUTPATIENT
Start: 2019-01-14 | End: 2019-03-20 | Stop reason: SDUPTHER

## 2019-01-14 RX ORDER — HYDROCODONE BITARTRATE AND ACETAMINOPHEN 7.5; 325 MG/1; MG/1
1 TABLET ORAL EVERY 6 HOURS PRN
Qty: 60 TABLET | Refills: 0 | Status: SHIPPED | OUTPATIENT
Start: 2019-01-14 | End: 2019-02-12 | Stop reason: SDUPTHER

## 2019-01-14 RX ORDER — TRAMADOL HYDROCHLORIDE 100 MG/1
200 TABLET, EXTENDED RELEASE ORAL DAILY
Qty: 60 TABLET | Refills: 2 | Status: SHIPPED | OUTPATIENT
Start: 2019-01-14 | End: 2019-03-20 | Stop reason: SDUPTHER

## 2019-01-14 NOTE — PROGRESS NOTES
Cathryn Cabello is a 73 y.o. female.     Chief Complaint   Patient presents with   • Neck Pain       History of Present Illness:    Here today complaining of degenerative disc disease/chronic pain of the right and left knee, neck pain and low back pain.  Patient reports that her recent use of baclofen was not helpful.  She continues to have chronic joint pain rated 9 on a pain scale rating of 0-10.  Her pain is described as sharp and catching.  She does have MRI on file showing degenerative disc disease as well as a bulging disc at every level in her lumbar spine.  Patient has a history of C-spine fusion.  She is scheduled for neurology consult for possibly further surgery.  Patient reports that she receives some pain reduction but is never out of pain even with her current medication regimen.  She has no history of substance abuse or addiction.  Patient currently receives Neurontin which is helpful with the stinging and burning in both hands and lower extremities.  She receives Norco on an as-needed basis which helps with the severe pain.  Patient also takes Ultram as an anti-inflammatory medication/pain rated Duser.      Asthma-no recent exacerbation    GERD-no recent exacerbation      The following portions of the patient's history were reviewed and updated as appropriate:  Allergies, current medications, past family history, past medical history, past social history, past surgical history and problem list.    Review of Systems   Constitutional: Negative for appetite change, fatigue and unexpected weight change.   HENT: Negative for congestion, ear pain, nosebleeds, postnasal drip, rhinorrhea, sore throat, trouble swallowing and voice change.    Eyes: Negative for pain and visual disturbance.   Respiratory: Negative for cough, shortness of breath and wheezing.    Cardiovascular: Negative for chest pain and palpitations.   Gastrointestinal: Negative for abdominal pain, blood in stool, constipation and  "diarrhea.   Endocrine: Negative for cold intolerance and polydipsia.   Genitourinary: Negative for difficulty urinating, flank pain and hematuria.   Musculoskeletal: Positive for arthralgias, back pain and neck pain. Negative for gait problem, joint swelling and myalgias.   Skin: Negative for color change and rash.   Allergic/Immunologic: Positive for environmental allergies.   Neurological: Negative for syncope, numbness and headaches.   Hematological: Negative.    Psychiatric/Behavioral: Negative for sleep disturbance and suicidal ideas. The patient is not nervous/anxious.    All other systems reviewed and are negative.      Objective     /80   Pulse 86   Temp 99.2 °F (37.3 °C) (Tympanic)   Ht 157.5 cm (62\")   Wt 64.4 kg (142 lb)   LMP 07/08/1986 (LMP Unknown)   SpO2 97%   BMI 25.97 kg/m²   Orders Only on 08/29/2018   Component Date Value Ref Range Status   • Urine Culture 08/29/2018 10,000-20,000 CFU/mL Normal Urogenital Veronica   Final   • Color, UA 08/29/2018 Yellow  Yellow, Straw Final   • Appearance, UA 08/29/2018 Clear  Clear Final   • pH, UA 08/29/2018 7.0  5.0 - 8.0 Final   • Specific Gravity, UA 08/29/2018 1.009  1.005 - 1.030 Final   • Glucose, UA 08/29/2018 Negative  Negative Final   • Ketones, UA 08/29/2018 Negative  Negative Final   • Bilirubin, UA 08/29/2018 Negative  Negative Final   • Blood, UA 08/29/2018 Negative  Negative Final   • Protein, UA 08/29/2018 Negative  Negative Final   • Leuk Esterase, UA 08/29/2018 Negative  Negative Final   • Nitrite, UA 08/29/2018 Negative  Negative Final   • Urobilinogen, UA 08/29/2018 0.2 E.U./dL  0.2 - 1.0 E.U./dL Final   • RBC, UA 08/29/2018 6-12* None Seen, 0-2 /HPF Final   • WBC, UA 08/29/2018 0-2  None Seen, 0-2 /HPF Final   • Bacteria, UA 08/29/2018 None Seen  None Seen /HPF Final   • Squamous Epithelial Cells, UA 08/29/2018 0-2  None Seen, 0-2 /HPF Final   • Hyaline Casts, UA 08/29/2018 None Seen  None Seen /LPF Final   • Methodology 08/29/2018 " Automated Microscopy   Final       Physical Exam   Constitutional: She is oriented to person, place, and time. Vital signs are normal. She appears well-developed and well-nourished. No distress.   Talkative and cheerful   HENT:   Head: Normocephalic.   Right Ear: External ear normal.   Left Ear: External ear normal.   Nose: Nose normal.   Mouth/Throat: Oropharynx is clear and moist and mucous membranes are normal.   Eyes: Conjunctivae and lids are normal. Pupils are equal, round, and reactive to light.   Neck: Normal range of motion. Neck supple. No tracheal deviation present. No thyromegaly present.   Cardiovascular: Normal rate, regular rhythm, normal heart sounds and normal pulses. Exam reveals no gallop and no friction rub.   No murmur heard.  Pulmonary/Chest: Effort normal and breath sounds normal. No respiratory distress. She has no wheezes. She has no rales. She exhibits no tenderness.   Abdominal: Soft. Normal appearance and bowel sounds are normal. She exhibits no distension and no mass. There is no tenderness. There is no rebound and no guarding.   Musculoskeletal:        Right shoulder: She exhibits decreased range of motion and crepitus.        Left shoulder: She exhibits decreased range of motion and crepitus.        Cervical back: She exhibits decreased range of motion, tenderness, pain and spasm.        Lumbar back: She exhibits decreased range of motion, pain and spasm.   Lymphadenopathy:     She has no cervical adenopathy.   Neurological: She is alert and oriented to person, place, and time. She has normal reflexes.   CN 2-12 grossly intact    Skin: Skin is warm and dry. Capillary refill takes less than 2 seconds. No rash noted. She is not diaphoretic. No erythema.   Psychiatric: She has a normal mood and affect. Her speech is normal and behavior is normal. Judgment and thought content normal. Her mood appears not anxious. Cognition and memory are normal. She does not exhibit a depressed mood.    Vitals reviewed.      Assessment/Plan     Problem List Items Addressed This Visit        Musculoskeletal and Integument    Chronic pain of right knee    Relevant Medications    HYDROcodone-acetaminophen (NORCO) 7.5-325 MG per tablet    traMADol ER (ULTRAM-ER) 100 MG 24 hr tablet    gabapentin (NEURONTIN) 100 MG capsule    Other Relevant Orders    Urine Drug Screen - Urine, Clean Catch    DDD (degenerative disc disease), cervical    Relevant Medications    HYDROcodone-acetaminophen (NORCO) 7.5-325 MG per tablet    traMADol ER (ULTRAM-ER) 100 MG 24 hr tablet    gabapentin (NEURONTIN) 100 MG capsule    Other Relevant Orders    Urine Drug Screen - Urine, Clean Catch        Proper body mechanics has been reviewed and discussed today.      As part of this patient's treatment plan they are being prescribed controlled substance/substances with potential for abuse. This patient has been made aware of the appropriate use of such medications, including potential risk of somnolence, limited ability to drive and / or work safely, and potential for overdose. It has also been made clear that these medications are for use by this patient only, without concomitant use of alcohol or other substances unless prescribed/advised by medical provider. Patient has completed controlled substance agreement detailing terms of continued prescribing of controlled substances including monitoring Diaz reports, drug screens and pill counts. The patient was asked and states they are not receiving narcotic medication from any there provider or any provider that this office has not been made aware of. History and physical exam exhibit continued safe and appropriate use of controlled substances.   Goal: Improved quality of life and reduction in pain as evidenced by pt report.   Diaz # [id number] has been reviewed as consistent.  UDS is on file.     Patient has been instructed and counseled regarding opioid misuse and risk of addiction.  We have  discussed proper storage and disposal of controlled medication.        Current Outpatient Medications:   •  cetirizine (zyrTEC) 10 MG tablet, Take 1 tablet by mouth Daily., Disp: 90 tablet, Rfl: 3  •  FLUoxetine (PROZAC) 20 MG capsule, Take 1 capsule by mouth Daily., Disp: 30 capsule, Rfl: 5  •  gabapentin (NEURONTIN) 100 MG capsule, 1 tablet twice daily, Disp: 60 capsule, Rfl: 2  •  HYDROcodone-acetaminophen (NORCO) 7.5-325 MG per tablet, Take 1 tablet by mouth Every 6 (Six) Hours As Needed for Moderate Pain ., Disp: 60 tablet, Rfl: 0  •  ibuprofen (ADVIL,MOTRIN) 800 MG tablet, Take 1 tablet by mouth Every 6 (Six) Hours As Needed for Mild Pain ., Disp: 90 tablet, Rfl: 0  •  montelukast (SINGULAIR) 10 MG tablet, Take 1 tablet by mouth Every Night., Disp: 90 tablet, Rfl: 3  •  pantoprazole (PROTONIX) 40 MG EC tablet, TAKE 1 TABLET BY MOUTH DAILY, Disp: 90 tablet, Rfl: 0  •  rOPINIRole (REQUIP) 2 MG tablet, Take 1 tablet by mouth Every Night. Take 1 hour before bedtime., Disp: 90 tablet, Rfl: 3  •  traMADol ER (ULTRAM-ER) 100 MG 24 hr tablet, Take 2 tablets by mouth Daily., Disp: 60 tablet, Rfl: 2  •  verapamil SR (CALAN-SR) 240 MG CR tablet, Take 1 tablet by mouth Every Night., Disp: 90 tablet, Rfl: 3  •  methocarbamol (ROBAXIN) 750 MG tablet, Take 1 tablet by mouth 3 (Three) Times a Day As Needed for Muscle Spasms., Disp: 90 tablet, Rfl: 5         Patient's Body mass index is 25.97 kg/m². BMI is within normal parameters. No follow-up required.    Patient is at moderate risk for substance abuse and addiction due to chronic pain.  She has no history of substance abuse.  We will continue to monitor her closely for compliance as well as the need to continue current medication regimen.    I have discussed diagnosis in detail today allowing time for questions and answers. Patient is aware of reasons to seek urgent or emergent medical care as well as reasons to return to the clinic for evaluation. Possible side effects,  interactions and progression of symptoms discussed as well. Patient / family states understanding.   Emotional support and active listening provided.     Follow up in one month, sooner if needed. Routine labs every 3-6 months.     Dictated utilizing Dragon dictation        This document has been electronically signed by:  ZHOU Moncada, NP-C

## 2019-02-12 ENCOUNTER — OFFICE VISIT (OUTPATIENT)
Dept: FAMILY MEDICINE CLINIC | Facility: CLINIC | Age: 74
End: 2019-02-12

## 2019-02-12 VITALS
TEMPERATURE: 99.3 F | HEIGHT: 62 IN | HEART RATE: 91 BPM | BODY MASS INDEX: 25.95 KG/M2 | OXYGEN SATURATION: 98 % | DIASTOLIC BLOOD PRESSURE: 80 MMHG | WEIGHT: 141 LBS | SYSTOLIC BLOOD PRESSURE: 140 MMHG

## 2019-02-12 DIAGNOSIS — J01.00 ACUTE NON-RECURRENT MAXILLARY SINUSITIS: ICD-10-CM

## 2019-02-12 DIAGNOSIS — G89.29 CHRONIC PAIN OF RIGHT KNEE: ICD-10-CM

## 2019-02-12 DIAGNOSIS — E78.2 MIXED HYPERLIPIDEMIA: Primary | ICD-10-CM

## 2019-02-12 DIAGNOSIS — I10 ESSENTIAL HYPERTENSION: ICD-10-CM

## 2019-02-12 DIAGNOSIS — M25.561 CHRONIC PAIN OF RIGHT KNEE: ICD-10-CM

## 2019-02-12 DIAGNOSIS — E11.9 TYPE 2 DIABETES MELLITUS WITHOUT COMPLICATION, WITHOUT LONG-TERM CURRENT USE OF INSULIN (HCC): ICD-10-CM

## 2019-02-12 DIAGNOSIS — F32.9 REACTIVE DEPRESSION: ICD-10-CM

## 2019-02-12 DIAGNOSIS — M50.30 DDD (DEGENERATIVE DISC DISEASE), CERVICAL: ICD-10-CM

## 2019-02-12 DIAGNOSIS — Z79.899 LONG-TERM USE OF HIGH-RISK MEDICATION: ICD-10-CM

## 2019-02-12 DIAGNOSIS — E03.9 ACQUIRED HYPOTHYROIDISM: ICD-10-CM

## 2019-02-12 PROCEDURE — 99214 OFFICE O/P EST MOD 30 MIN: CPT | Performed by: NURSE PRACTITIONER

## 2019-02-12 PROCEDURE — 96372 THER/PROPH/DIAG INJ SC/IM: CPT | Performed by: NURSE PRACTITIONER

## 2019-02-12 RX ORDER — HYDROCODONE BITARTRATE AND ACETAMINOPHEN 7.5; 325 MG/1; MG/1
1 TABLET ORAL EVERY 6 HOURS PRN
Qty: 60 TABLET | Refills: 0 | Status: SHIPPED | OUTPATIENT
Start: 2019-02-12 | End: 2019-03-20 | Stop reason: SDUPTHER

## 2019-02-12 RX ORDER — KETOROLAC TROMETHAMINE 30 MG/ML
60 INJECTION, SOLUTION INTRAMUSCULAR; INTRAVENOUS ONCE
Status: COMPLETED | OUTPATIENT
Start: 2019-02-12 | End: 2019-02-12

## 2019-02-12 RX ORDER — AZITHROMYCIN 250 MG/1
TABLET, FILM COATED ORAL
Qty: 6 TABLET | Refills: 0 | Status: SHIPPED | OUTPATIENT
Start: 2019-02-12 | End: 2019-03-20

## 2019-02-12 RX ORDER — GUAIFENESIN/DEXTROMETHORPHAN 100-10MG/5
5 SYRUP ORAL 3 TIMES DAILY PRN
Qty: 1 EACH | Refills: 0 | Status: SHIPPED | OUTPATIENT
Start: 2019-02-12 | End: 2019-03-20

## 2019-02-12 RX ORDER — FLUOXETINE HYDROCHLORIDE 20 MG/1
20 CAPSULE ORAL DAILY
Qty: 30 CAPSULE | Refills: 5 | Status: SHIPPED | OUTPATIENT
Start: 2019-02-12 | End: 2019-06-20 | Stop reason: SDUPTHER

## 2019-02-12 RX ADMIN — KETOROLAC TROMETHAMINE 60 MG: 30 INJECTION, SOLUTION INTRAMUSCULAR; INTRAVENOUS at 15:41

## 2019-02-12 NOTE — PROGRESS NOTES
Subjective   Tyler Cabello is a 73 y.o. female.     Chief Complaint   Patient presents with   • URI   • Hyperlipidemia   • Hypertension   • Diabetes   • Hypothyroidism       History of Present Illness:    Sinus pressure and drainage ×1 week.  With sore throat and mild cough.  Cough is worse when she reclines.    Chronic pain in multiple sites with degenerative disc disease.  Degenerative disc disease/osteoarthritis and chronic neck/shoulder pain/history of spinal fusion - patient rates her pain as 8 on a pain scale rating of 0-10.  Activity such as routine housework and chores increases her pain.  She become stiff with prolonged sitting/lying.  Patient does have difficulty sleeping at night due to joint pain.  She has been seen by neurology.  Radiology is on file.  Patient is no longer able to do the activities she wants enjoyed due to her joint pain.  She describes her pain as an aching and throbbing pain that is always there.  She does have periods of increased pain and stiffness.  She has no history of substance abuse or addiction.  Patient does report that her pain is decreased by approximately 50% with use of tramadol and the occasional Norco when her pain is not managed or becomes severe.     Low back pain with bilateral sciatica/radiculopathy-patient reports that Neurontin twice daily is helpful for her symptoms.  She has no history of substance abuse or addiction.  Patient has a history of cervical spine surgery and fusion.  Neurontin is helpful for the tingling and burning sensation that she has in her upper and lower extremities.  Pt is scheduled with neurology in [place] tomorrow for consult.     Reactive depression-patient states that she feels her depression is improved with Prozac.  She has been attending counseling with Ian Diaz licensed certified .  Patient feels counseling has been very therapeutic.  She reports that her 's health has improved some as well as of their  "relationship.  She has decided to let the past for the past and focus on the present and future.  She denies any thoughts of hurting herself or others.      The following portions of the patient's history were reviewed and updated as appropriate:  Allergies, current medications, past family history, past medical history, past social history, past surgical history and problem list.    Review of Systems   Constitutional: Negative for appetite change, fatigue and unexpected weight change.   HENT: Positive for sinus pressure, sinus pain and sore throat. Negative for congestion, ear pain, nosebleeds, postnasal drip, rhinorrhea, trouble swallowing and voice change.    Eyes: Negative for pain and visual disturbance.   Respiratory: Positive for cough. Negative for shortness of breath and wheezing.    Cardiovascular: Negative for chest pain and palpitations.   Gastrointestinal: Negative for abdominal pain, blood in stool, constipation and diarrhea.   Endocrine: Negative for cold intolerance and polydipsia.   Genitourinary: Negative for difficulty urinating, flank pain and hematuria.   Musculoskeletal: Negative for arthralgias, back pain, gait problem, joint swelling and myalgias.   Skin: Negative for color change and rash.   Allergic/Immunologic: Positive for environmental allergies.   Neurological: Positive for headaches. Negative for syncope and numbness.   Hematological: Negative.    Psychiatric/Behavioral: Negative for sleep disturbance and suicidal ideas.   All other systems reviewed and are negative.      Objective     /80   Pulse 91   Temp 99.3 °F (37.4 °C) (Tympanic)   Ht 157.5 cm (62\")   Wt 64 kg (141 lb)   LMP 07/08/1986 (LMP Unknown)   SpO2 98%   BMI 25.79 kg/m²   Orders Only on 08/29/2018   Component Date Value Ref Range Status   • Urine Culture 08/29/2018 10,000-20,000 CFU/mL Normal Urogenital Veronica   Final   • Color, UA 08/29/2018 Yellow  Yellow, Straw Final   • Appearance, UA 08/29/2018 Clear  " Clear Final   • pH, UA 08/29/2018 7.0  5.0 - 8.0 Final   • Specific Gravity, UA 08/29/2018 1.009  1.005 - 1.030 Final   • Glucose, UA 08/29/2018 Negative  Negative Final   • Ketones, UA 08/29/2018 Negative  Negative Final   • Bilirubin, UA 08/29/2018 Negative  Negative Final   • Blood, UA 08/29/2018 Negative  Negative Final   • Protein, UA 08/29/2018 Negative  Negative Final   • Leuk Esterase, UA 08/29/2018 Negative  Negative Final   • Nitrite, UA 08/29/2018 Negative  Negative Final   • Urobilinogen, UA 08/29/2018 0.2 E.U./dL  0.2 - 1.0 E.U./dL Final   • RBC, UA 08/29/2018 6-12* None Seen, 0-2 /HPF Final   • WBC, UA 08/29/2018 0-2  None Seen, 0-2 /HPF Final   • Bacteria, UA 08/29/2018 None Seen  None Seen /HPF Final   • Squamous Epithelial Cells, UA 08/29/2018 0-2  None Seen, 0-2 /HPF Final   • Hyaline Casts, UA 08/29/2018 None Seen  None Seen /LPF Final   • Methodology 08/29/2018 Automated Microscopy   Final       Physical Exam   Constitutional: She is oriented to person, place, and time. Vital signs are normal. She appears well-developed and well-nourished. No distress.   Talkative and cheerful   HENT:   Head: Normocephalic.   Right Ear: External ear normal.   Left Ear: External ear normal.   Nose: Mucosal edema present. Right sinus exhibits maxillary sinus tenderness. Left sinus exhibits maxillary sinus tenderness.   Mouth/Throat: Oropharynx is clear and moist and mucous membranes are normal.   Eyes: Conjunctivae and lids are normal. Pupils are equal, round, and reactive to light.   Neck: Normal range of motion. Neck supple. No tracheal deviation present. No thyromegaly present.   Cardiovascular: Normal rate, regular rhythm, normal heart sounds and normal pulses. Exam reveals no gallop and no friction rub.   No murmur heard.  Pulmonary/Chest: Effort normal and breath sounds normal. No respiratory distress. She has no wheezes. She has no rales. She exhibits tenderness (with deep breath ).       Abdominal: Soft.  Normal appearance and bowel sounds are normal. She exhibits no distension and no mass. There is no tenderness. There is no rebound and no guarding.   Musculoskeletal:        Right shoulder: She exhibits decreased range of motion and crepitus.        Left shoulder: She exhibits decreased range of motion and crepitus.        Cervical back: She exhibits decreased range of motion, tenderness, pain and spasm.        Lumbar back: She exhibits decreased range of motion, pain and spasm.   Lymphadenopathy:     She has no cervical adenopathy.   Neurological: She is alert and oriented to person, place, and time. She has normal reflexes.   CN 2-12 grossly intact    Skin: Skin is warm and dry. Capillary refill takes less than 2 seconds. No rash noted. She is not diaphoretic. No erythema.   Psychiatric: She has a normal mood and affect. Her speech is normal and behavior is normal. Judgment and thought content normal. Her mood appears not anxious. Cognition and memory are normal. She does not exhibit a depressed mood.   Vitals reviewed.      Assessment/Plan     Problem List Items Addressed This Visit        Cardiovascular and Mediastinum    Mixed hyperlipidemia - Primary    Relevant Orders    Comprehensive Metabolic Panel    CBC & Differential    TSH    Hemoglobin A1c    Vitamin D 25 Hydroxy    Lipid Panel    Vitamin B12    Essential hypertension    Relevant Orders    Comprehensive Metabolic Panel    CBC & Differential    TSH    Hemoglobin A1c    Vitamin D 25 Hydroxy    Lipid Panel    Vitamin B12       Endocrine    Type 2 diabetes mellitus without complication (CMS/HCC)    Relevant Orders    Comprehensive Metabolic Panel    CBC & Differential    TSH    Hemoglobin A1c    Vitamin D 25 Hydroxy    Lipid Panel    Vitamin B12    Acquired hypothyroidism    Relevant Orders    Comprehensive Metabolic Panel    CBC & Differential    TSH    Hemoglobin A1c    Vitamin D 25 Hydroxy    Lipid Panel    Vitamin B12       Musculoskeletal and  Integument    Chronic pain of right knee    Relevant Medications    HYDROcodone-acetaminophen (NORCO) 7.5-325 MG per tablet    DDD (degenerative disc disease), cervical    Relevant Medications    HYDROcodone-acetaminophen (NORCO) 7.5-325 MG per tablet    ketorolac (TORADOL) injection 60 mg (Completed)       Other    Reactive depression    Relevant Medications    FLUoxetine (PROZAC) 20 MG capsule      Other Visit Diagnoses     Acute non-recurrent maxillary sinusitis        Relevant Medications    azithromycin (ZITHROMAX Z-KELLEE) 250 MG tablet    guaifenesin-dextromethorphan (ROBITUSSIN DM) 100-10 MG/5ML syrup        Proper body mechanics has been reviewed and discussed today.  As part of this patient's treatment plan they are being prescribed controlled substance/substances with potential for abuse. This patient has been made aware of the appropriate use of such medications, including potential risk of somnolence, limited ability to drive and / or work safely, and potential for overdose. It has also been made clear that these medications are for use by this patient only, without concomitant use of alcohol or other substances unless prescribed/advised by medical provider. Patient has completed controlled substance agreement detailing terms of continued prescribing of controlled substances including monitoring Diaz reports, drug screens and pill counts. The patient was asked and states they are not receiving narcotic medication from any there provider or any provider that this office has not been made aware of. History and physical exam exhibit continued safe and appropriate use of controlled substances.   Goal: Improved quality of life and reduction in pain as evidenced by pt report.   Diaz # [id number] has been reviewed as consistent.  UDS is on file.   [facility] prescription provided.  Patient is low risk for substance abuse.  She is proven compliant.  We will continue to monitor her are regular as well as random basis  for compliance and the need to change her plan of care.  Patient has been instructed and counseled regarding opioid misuse and risk of addiction.  We have discussed proper storage and disposal of controlled medication.       Patient's Body mass index is 25.79 kg/m². BMI is above normal parameters. Recommendations include: exercise counseling and nutrition counseling.    I have discussed diagnosis in detail today allowing time for questions and answers. Patient is aware of reasons to seek urgent or emergent medical care as well as reasons to return to the clinic for evaluation. Possible side effects, interactions and progression of symptoms discussed as well. Patient / family states understanding.   Emotional support and active listening provided.     Fluids, rest, symptomatic treatment advised. Steam therapy. Dispose of tooth bush after 24 hours of starting antibiotics if ordered. Complete antibiotics as ordered.  Discussed possible side effects/interactions of medication. Discussed symptoms to report as well as reasons to seek urgent or emergent medical attention. Understanding stated.   Recommend follow up in 2-3 days if not improved, sooner if needed.     If not improved in next few days will request a chest xray.   Follow up in one month, sooner if needed. Routine labs every 3-6 months.     Dictated utilizing Dragon dictation      Keep neurology appointment tomorrow           This document has been electronically signed by:  ZHOU Moncada, NP-C

## 2019-03-06 DIAGNOSIS — Z79.899 LONG-TERM USE OF HIGH-RISK MEDICATION: Primary | ICD-10-CM

## 2019-03-20 ENCOUNTER — OFFICE VISIT (OUTPATIENT)
Dept: FAMILY MEDICINE CLINIC | Facility: CLINIC | Age: 74
End: 2019-03-20

## 2019-03-20 VITALS
WEIGHT: 143 LBS | TEMPERATURE: 97.3 F | OXYGEN SATURATION: 98 % | BODY MASS INDEX: 26.31 KG/M2 | HEART RATE: 74 BPM | HEIGHT: 62 IN | DIASTOLIC BLOOD PRESSURE: 80 MMHG | SYSTOLIC BLOOD PRESSURE: 160 MMHG

## 2019-03-20 DIAGNOSIS — E11.9 TYPE 2 DIABETES MELLITUS WITHOUT COMPLICATION, WITH LONG-TERM CURRENT USE OF INSULIN (HCC): Primary | ICD-10-CM

## 2019-03-20 DIAGNOSIS — Z79.4 TYPE 2 DIABETES MELLITUS WITHOUT COMPLICATION, WITH LONG-TERM CURRENT USE OF INSULIN (HCC): Primary | ICD-10-CM

## 2019-03-20 DIAGNOSIS — M50.30 DDD (DEGENERATIVE DISC DISEASE), CERVICAL: ICD-10-CM

## 2019-03-20 DIAGNOSIS — M25.561 CHRONIC PAIN OF RIGHT KNEE: ICD-10-CM

## 2019-03-20 DIAGNOSIS — G89.29 CHRONIC PAIN OF RIGHT KNEE: ICD-10-CM

## 2019-03-20 PROCEDURE — 99214 OFFICE O/P EST MOD 30 MIN: CPT | Performed by: NURSE PRACTITIONER

## 2019-03-20 RX ORDER — TRAMADOL HYDROCHLORIDE 100 MG/1
200 TABLET, EXTENDED RELEASE ORAL DAILY
Qty: 60 TABLET | Refills: 2 | Status: SHIPPED | OUTPATIENT
Start: 2019-03-20 | End: 2019-06-20 | Stop reason: SDUPTHER

## 2019-03-20 RX ORDER — GABAPENTIN 100 MG/1
CAPSULE ORAL
Qty: 60 CAPSULE | Refills: 2 | Status: SHIPPED | OUTPATIENT
Start: 2019-03-20 | End: 2019-06-20 | Stop reason: SDUPTHER

## 2019-03-20 RX ORDER — HYDROCODONE BITARTRATE AND ACETAMINOPHEN 7.5; 325 MG/1; MG/1
1 TABLET ORAL EVERY 6 HOURS PRN
Qty: 60 TABLET | Refills: 0 | Status: SHIPPED | OUTPATIENT
Start: 2019-03-20 | End: 2019-06-20 | Stop reason: SDUPTHER

## 2019-03-20 NOTE — PROGRESS NOTES
Subjective   Tyler Cabello is a 73 y.o. female.     Chief Complaint   Patient presents with   • Back Pain   • Osteoarthritis       History of Present Illness:    Chronic pain in multiple sites with degenerative disc disease.  Degenerative disc disease/osteoarthritis and chronic neck/shoulder pain/history of spinal fusion - patient rates her pain as 8 on a pain scale rating of 0-10.  Activity such as routine housework and chores increases her pain.  She become stiff with prolonged sitting/lying.  Patient does have difficulty sleeping at night due to joint pain.  She has been seen by neurology.  Radiology is on file.  Patient is no longer able to do the activities she wants enjoyed due to her joint pain.  She describes her pain as an aching and throbbing pain that is always there.  She does have periods of increased pain and stiffness.  She has no history of substance abuse or addiction.  Patient does report that her pain is decreased by approximately 50% with use of tramadol and the occasional Norco when her pain is not managed or becomes severe.     Low back pain with bilateral sciatica/radiculopathy-patient reports that Neurontin twice daily is helpful for her symptoms.  She has no history of substance abuse or addiction.  Patient has a history of cervical spine surgery and fusion.  Neurontin is helpful for the tingling and burning sensation that she has in her upper and lower extremities. She is under the care of neurology.     Has neurology follow up today in [facility].     Type 2 diabetes without complication-she has been working on diet and lifestyle.  Home glucose readings have been within normal range.      The following portions of the patient's history were reviewed and updated as appropriate:  Allergies, current medications, past family history, past medical history, past social history, past surgical history and problem list.    Review of Systems   Constitutional: Negative for appetite change, fatigue  "and unexpected weight change.   HENT: Negative for congestion, ear pain, nosebleeds, postnasal drip, rhinorrhea, sore throat, trouble swallowing and voice change.    Eyes: Negative for pain and visual disturbance.   Respiratory: Negative for cough, shortness of breath and wheezing.    Cardiovascular: Negative for chest pain and palpitations.   Gastrointestinal: Negative for abdominal pain, blood in stool, constipation and diarrhea.   Endocrine: Negative for cold intolerance and polydipsia.   Genitourinary: Negative for difficulty urinating, flank pain and hematuria.   Musculoskeletal: Positive for arthralgias, back pain, gait problem, myalgias and neck pain. Negative for joint swelling and neck stiffness.   Skin: Negative for color change and rash.   Allergic/Immunologic: Positive for environmental allergies.   Neurological: Negative for syncope, numbness and headaches.   Hematological: Negative.    Psychiatric/Behavioral: Positive for sleep disturbance. Negative for dysphoric mood, self-injury and suicidal ideas.   All other systems reviewed and are negative.      Objective     /80   Pulse 74   Temp 97.3 °F (36.3 °C) (Tympanic)   Ht 157.5 cm (62\")   Wt 64.9 kg (143 lb)   LMP 07/08/1986 (LMP Unknown)   SpO2 98%   BMI 26.16 kg/m²   Orders Only on 08/29/2018   Component Date Value Ref Range Status   • Urine Culture 08/29/2018 10,000-20,000 CFU/mL Normal Urogenital Veronica   Final   • Color, UA 08/29/2018 Yellow  Yellow, Straw Final   • Appearance, UA 08/29/2018 Clear  Clear Final   • pH, UA 08/29/2018 7.0  5.0 - 8.0 Final   • Specific Gravity, UA 08/29/2018 1.009  1.005 - 1.030 Final   • Glucose, UA 08/29/2018 Negative  Negative Final   • Ketones, UA 08/29/2018 Negative  Negative Final   • Bilirubin, UA 08/29/2018 Negative  Negative Final   • Blood, UA 08/29/2018 Negative  Negative Final   • Protein, UA 08/29/2018 Negative  Negative Final   • Leuk Esterase, UA 08/29/2018 Negative  Negative Final   • " Nitrite, UA 08/29/2018 Negative  Negative Final   • Urobilinogen, UA 08/29/2018 0.2 E.U./dL  0.2 - 1.0 E.U./dL Final   • RBC, UA 08/29/2018 6-12* None Seen, 0-2 /HPF Final   • WBC, UA 08/29/2018 0-2  None Seen, 0-2 /HPF Final   • Bacteria, UA 08/29/2018 None Seen  None Seen /HPF Final   • Squamous Epithelial Cells, UA 08/29/2018 0-2  None Seen, 0-2 /HPF Final   • Hyaline Casts, UA 08/29/2018 None Seen  None Seen /LPF Final   • Methodology 08/29/2018 Automated Microscopy   Final       Physical Exam   Constitutional: She is oriented to person, place, and time. Vital signs are normal. She appears well-developed and well-nourished. No distress.   Very pleasant and appropriate 73-year-old female   HENT:   Head: Normocephalic.   Right Ear: External ear normal.   Left Ear: External ear normal.   Nose: Nose normal.   Mouth/Throat: Oropharynx is clear and moist and mucous membranes are normal.   Eyes: Conjunctivae and lids are normal. Pupils are equal, round, and reactive to light.   Neck: Normal range of motion. Neck supple. No tracheal deviation present. No thyromegaly present.   Cardiovascular: Normal rate, regular rhythm, normal heart sounds and normal pulses. Exam reveals no gallop and no friction rub.   No murmur heard.  Pulmonary/Chest: Effort normal and breath sounds normal. No respiratory distress. She has no wheezes. She has no rales. She exhibits no tenderness.   Abdominal: Soft. Normal appearance and bowel sounds are normal. She exhibits no distension and no mass. There is no tenderness. There is no rebound and no guarding.   Musculoskeletal:        Right shoulder: She exhibits decreased range of motion and crepitus.        Left shoulder: She exhibits decreased range of motion and crepitus.        Cervical back: She exhibits decreased range of motion, tenderness, pain and spasm.        Lumbar back: She exhibits decreased range of motion, pain and spasm.   Lymphadenopathy:     She has no cervical adenopathy.    Neurological: She is alert and oriented to person, place, and time. She has normal reflexes.   CN 2-12 grossly intact    Skin: Skin is warm and dry. Capillary refill takes less than 2 seconds. No rash noted. She is not diaphoretic. No erythema.   Psychiatric: She has a normal mood and affect. Her speech is normal and behavior is normal. Judgment and thought content normal. Her mood appears not anxious. Cognition and memory are normal. She does not exhibit a depressed mood.   Vitals reviewed.      Assessment/Plan     Problem List Items Addressed This Visit        Endocrine    Type 2 diabetes mellitus without complication (CMS/[facility]) - Primary    Relevant Orders    CBC & Differential    Comprehensive Metabolic Panel    TSH    Hemoglobin A1c    Vitamin D 25 Hydroxy    Vitamin B12    Lipid Panel       Musculoskeletal and Integument    Chronic pain of right knee    Relevant Medications    traMADol ER (ULTRAM-ER) 100 MG 24 hr tablet    HYDROcodone-acetaminophen (NORCO) 7.5-325 MG per tablet    gabapentin (NEURONTIN) 100 MG capsule    Other Relevant Orders    CBC & Differential    Comprehensive Metabolic Panel    TSH    Hemoglobin A1c    Vitamin D 25 Hydroxy    Vitamin B12    Lipid Panel    DDD (degenerative disc disease), cervical    Relevant Medications    traMADol ER (ULTRAM-ER) 100 MG 24 hr tablet    HYDROcodone-acetaminophen (NORCO) 7.5-325 MG per tablet    gabapentin (NEURONTIN) 100 MG capsule    Other Relevant Orders    CBC & Differential    Comprehensive Metabolic Panel    TSH    Hemoglobin A1c    Vitamin D 25 Hydroxy    Vitamin B12    Lipid Panel            Proper body mechanics has been reviewed and discussed today.      As part of this patient's treatment plan they are being prescribed controlled substance/substances with potential for abuse. This patient has been made aware of the appropriate use of such medications, including potential risk of somnolence, limited ability to drive and / or work safely, and  potential for overdose. It has also been made clear that these medications are for use by this patient only, without concomitant use of alcohol or other substances unless prescribed/advised by medical provider. Patient has completed controlled substance agreement detailing terms of continued prescribing of controlled substances including monitoring Diaz reports, drug screens and pill counts. The patient was asked and states they are not receiving narcotic medication from any there provider or any provider that this office has not been made aware of. History and physical exam exhibit continued safe and appropriate use of controlled substances.   Goal: Improved quality of life and reduction in pain as evidenced by pt report.   Diaz  has been reviewed as consistent.  UDS is on file.     Patient has been instructed and counseled regarding opioid misuse and risk of addiction.  We have discussed proper storage and disposal of controlled medication.  Pt is at low risk for substance abuse or addiction. Pt will be monitored closely for compliance and the need to continue plan of care.  Neurontin prescription provided.  Norco prescription provided.  Tramadol prescription provided.           Patient's Body mass index is 26.16 kg/m². BMI is above normal parameters. Recommendations include: exercise counseling and nutrition counseling.        I have discussed diagnosis in detail today allowing time for questions and answers. Patient is aware of reasons to seek urgent or emergent medical care as well as reasons to return to the clinic for evaluation. Possible side effects, interactions and progression of symptoms discussed as well. Patient / family states understanding.   Emotional support and active listening provided.     Follow up in one month, sooner if needed. Routine labs every 3-6 months.     Fasting labs one month prior .     Dictated utilizing Dragon dictation        This document has been electronically signed by:   ZHOU Moncada, NP-C

## 2019-04-15 RX ORDER — TRIAMCINOLONE ACETONIDE 1 MG/G
CREAM TOPICAL
Qty: 80 G | Refills: 0 | Status: SHIPPED | OUTPATIENT
Start: 2019-04-15 | End: 2020-09-03 | Stop reason: SDUPTHER

## 2019-06-17 ENCOUNTER — LAB (OUTPATIENT)
Dept: FAMILY MEDICINE CLINIC | Facility: CLINIC | Age: 74
End: 2019-06-17

## 2019-06-17 DIAGNOSIS — E11.9 TYPE 2 DIABETES MELLITUS WITHOUT COMPLICATION, WITH LONG-TERM CURRENT USE OF INSULIN (HCC): ICD-10-CM

## 2019-06-17 DIAGNOSIS — Z79.4 TYPE 2 DIABETES MELLITUS WITHOUT COMPLICATION, WITH LONG-TERM CURRENT USE OF INSULIN (HCC): ICD-10-CM

## 2019-06-17 DIAGNOSIS — M50.30 DDD (DEGENERATIVE DISC DISEASE), CERVICAL: ICD-10-CM

## 2019-06-17 DIAGNOSIS — M25.561 CHRONIC PAIN OF RIGHT KNEE: ICD-10-CM

## 2019-06-17 DIAGNOSIS — G89.29 CHRONIC PAIN OF RIGHT KNEE: ICD-10-CM

## 2019-06-17 LAB
25(OH)D3 SERPL-MCNC: 37.7 NG/ML (ref 30–100)
ALBUMIN SERPL-MCNC: 4.6 G/DL (ref 3.5–5.2)
ALBUMIN/GLOB SERPL: 2 G/DL
ALP SERPL-CCNC: 62 U/L (ref 39–117)
ALT SERPL W P-5'-P-CCNC: 35 U/L (ref 1–33)
ANION GAP SERPL CALCULATED.3IONS-SCNC: 14.1 MMOL/L
AST SERPL-CCNC: 29 U/L (ref 1–32)
BASOPHILS # BLD AUTO: 0.06 10*3/MM3 (ref 0–0.2)
BASOPHILS NFR BLD AUTO: 1 % (ref 0–1.5)
BILIRUB SERPL-MCNC: 0.4 MG/DL (ref 0.2–1.2)
BUN BLD-MCNC: 15 MG/DL (ref 8–23)
BUN/CREAT SERPL: 17.4 (ref 7–25)
CALCIUM SPEC-SCNC: 9.8 MG/DL (ref 8.6–10.5)
CHLORIDE SERPL-SCNC: 104 MMOL/L (ref 98–107)
CHOLEST SERPL-MCNC: 244 MG/DL (ref 0–200)
CO2 SERPL-SCNC: 24.9 MMOL/L (ref 22–29)
CREAT BLD-MCNC: 0.86 MG/DL (ref 0.57–1)
DEPRECATED RDW RBC AUTO: 46.3 FL (ref 37–54)
EOSINOPHIL # BLD AUTO: 0.15 10*3/MM3 (ref 0–0.4)
EOSINOPHIL NFR BLD AUTO: 2.5 % (ref 0.3–6.2)
ERYTHROCYTE [DISTWIDTH] IN BLOOD BY AUTOMATED COUNT: 13.2 % (ref 12.3–15.4)
GFR SERPL CREATININE-BSD FRML MDRD: 65 ML/MIN/1.73
GFR SERPL CREATININE-BSD FRML MDRD: 78 ML/MIN/1.73
GLOBULIN UR ELPH-MCNC: 2.3 GM/DL
GLUCOSE BLD-MCNC: 106 MG/DL (ref 65–99)
HBA1C MFR BLD: 6.22 % (ref 4.8–5.6)
HCT VFR BLD AUTO: 42.8 % (ref 34–46.6)
HDLC SERPL-MCNC: 41 MG/DL (ref 40–60)
HGB BLD-MCNC: 13.5 G/DL (ref 12–15.9)
IMM GRANULOCYTES # BLD AUTO: 0.01 10*3/MM3 (ref 0–0.05)
IMM GRANULOCYTES NFR BLD AUTO: 0.2 % (ref 0–0.5)
LDLC SERPL CALC-MCNC: 149 MG/DL (ref 0–100)
LDLC/HDLC SERPL: 3.64 {RATIO}
LYMPHOCYTES # BLD AUTO: 2.35 10*3/MM3 (ref 0.7–3.1)
LYMPHOCYTES NFR BLD AUTO: 39.6 % (ref 19.6–45.3)
MCH RBC QN AUTO: 30.1 PG (ref 26.6–33)
MCHC RBC AUTO-ENTMCNC: 31.5 G/DL (ref 31.5–35.7)
MCV RBC AUTO: 95.5 FL (ref 79–97)
MONOCYTES # BLD AUTO: 0.33 10*3/MM3 (ref 0.1–0.9)
MONOCYTES NFR BLD AUTO: 5.6 % (ref 5–12)
NEUTROPHILS # BLD AUTO: 3.04 10*3/MM3 (ref 1.7–7)
NEUTROPHILS NFR BLD AUTO: 51.1 % (ref 42.7–76)
NRBC BLD AUTO-RTO: 0 /100 WBC (ref 0–0.2)
PLATELET # BLD AUTO: 310 10*3/MM3 (ref 140–450)
PMV BLD AUTO: 11.2 FL (ref 6–12)
POTASSIUM BLD-SCNC: 4.4 MMOL/L (ref 3.5–5.2)
PROT SERPL-MCNC: 6.9 G/DL (ref 6–8.5)
RBC # BLD AUTO: 4.48 10*6/MM3 (ref 3.77–5.28)
SODIUM BLD-SCNC: 143 MMOL/L (ref 136–145)
TRIGL SERPL-MCNC: 269 MG/DL (ref 0–150)
TSH SERPL DL<=0.05 MIU/L-ACNC: 2.88 MIU/ML (ref 0.27–4.2)
VIT B12 BLD-MCNC: 858 PG/ML (ref 211–946)
VLDLC SERPL-MCNC: 53.8 MG/DL (ref 5–40)
WBC NRBC COR # BLD: 5.94 10*3/MM3 (ref 3.4–10.8)

## 2019-06-17 PROCEDURE — 80061 LIPID PANEL: CPT | Performed by: NURSE PRACTITIONER

## 2019-06-17 PROCEDURE — 84443 ASSAY THYROID STIM HORMONE: CPT | Performed by: NURSE PRACTITIONER

## 2019-06-17 PROCEDURE — 85025 COMPLETE CBC W/AUTO DIFF WBC: CPT | Performed by: NURSE PRACTITIONER

## 2019-06-17 PROCEDURE — 80053 COMPREHEN METABOLIC PANEL: CPT | Performed by: NURSE PRACTITIONER

## 2019-06-17 PROCEDURE — 82306 VITAMIN D 25 HYDROXY: CPT | Performed by: NURSE PRACTITIONER

## 2019-06-17 PROCEDURE — 83036 HEMOGLOBIN GLYCOSYLATED A1C: CPT | Performed by: NURSE PRACTITIONER

## 2019-06-17 PROCEDURE — 82607 VITAMIN B-12: CPT | Performed by: NURSE PRACTITIONER

## 2019-06-20 ENCOUNTER — OFFICE VISIT (OUTPATIENT)
Dept: FAMILY MEDICINE CLINIC | Facility: CLINIC | Age: 74
End: 2019-06-20

## 2019-06-20 VITALS
HEART RATE: 90 BPM | BODY MASS INDEX: 25.21 KG/M2 | DIASTOLIC BLOOD PRESSURE: 84 MMHG | WEIGHT: 137 LBS | SYSTOLIC BLOOD PRESSURE: 150 MMHG | OXYGEN SATURATION: 97 % | HEIGHT: 62 IN | TEMPERATURE: 98.3 F

## 2019-06-20 DIAGNOSIS — G89.29 CHRONIC PAIN OF BOTH KNEES: Primary | ICD-10-CM

## 2019-06-20 DIAGNOSIS — M50.30 DDD (DEGENERATIVE DISC DISEASE), CERVICAL: ICD-10-CM

## 2019-06-20 DIAGNOSIS — M25.562 CHRONIC PAIN OF BOTH KNEES: Primary | ICD-10-CM

## 2019-06-20 DIAGNOSIS — F32.9 REACTIVE DEPRESSION: ICD-10-CM

## 2019-06-20 DIAGNOSIS — E78.2 MIXED HYPERLIPIDEMIA: ICD-10-CM

## 2019-06-20 DIAGNOSIS — M25.561 CHRONIC PAIN OF BOTH KNEES: Primary | ICD-10-CM

## 2019-06-20 DIAGNOSIS — M25.561 CHRONIC PAIN OF RIGHT KNEE: ICD-10-CM

## 2019-06-20 DIAGNOSIS — E11.9 TYPE 2 DIABETES MELLITUS WITHOUT COMPLICATION, WITHOUT LONG-TERM CURRENT USE OF INSULIN (HCC): ICD-10-CM

## 2019-06-20 DIAGNOSIS — G89.29 CHRONIC PAIN OF RIGHT KNEE: ICD-10-CM

## 2019-06-20 PROCEDURE — 99214 OFFICE O/P EST MOD 30 MIN: CPT | Performed by: NURSE PRACTITIONER

## 2019-06-20 PROCEDURE — 20610 DRAIN/INJ JOINT/BURSA W/O US: CPT | Performed by: NURSE PRACTITIONER

## 2019-06-20 RX ORDER — HYDROCODONE BITARTRATE AND ACETAMINOPHEN 7.5; 325 MG/1; MG/1
1 TABLET ORAL EVERY 6 HOURS PRN
Qty: 60 TABLET | Refills: 0 | Status: SHIPPED | OUTPATIENT
Start: 2019-06-20 | End: 2019-07-23 | Stop reason: SDUPTHER

## 2019-06-20 RX ORDER — TRAMADOL HYDROCHLORIDE 100 MG/1
200 TABLET, EXTENDED RELEASE ORAL DAILY
Qty: 60 TABLET | Refills: 2 | Status: SHIPPED | OUTPATIENT
Start: 2019-06-20 | End: 2019-09-19 | Stop reason: SDUPTHER

## 2019-06-20 RX ORDER — ROSUVASTATIN CALCIUM 5 MG/1
5 TABLET, COATED ORAL NIGHTLY
Qty: 30 TABLET | Refills: 5 | Status: SHIPPED | OUTPATIENT
Start: 2019-06-20 | End: 2020-01-28 | Stop reason: SDUPTHER

## 2019-06-20 RX ORDER — FLUOXETINE HYDROCHLORIDE 20 MG/1
20 CAPSULE ORAL DAILY
Qty: 30 CAPSULE | Refills: 5 | Status: SHIPPED | OUTPATIENT
Start: 2019-06-20 | End: 2020-01-28 | Stop reason: SDUPTHER

## 2019-06-20 RX ORDER — GABAPENTIN 100 MG/1
CAPSULE ORAL
Qty: 60 CAPSULE | Refills: 2 | Status: SHIPPED | OUTPATIENT
Start: 2019-06-20 | End: 2019-09-19 | Stop reason: SDUPTHER

## 2019-06-20 NOTE — ASSESSMENT & PLAN NOTE
"Tolerated bilateral knee joint injection well.  Patient has been educated today regarding procedure.  We have discussed the risk versus benefits of this procedure including \"red man\" syndrome, infection, injury, discomfort and allergic reaction.  Patient is aware that this is not an all inclusive list of possible side effects.  We have discussed alternative treatments.  The patient verbalizes understanding of this information.  Their questions were sought and answered.  Patient verbalizes consent to proceed with procedure today.  I have discussed with the patient that full benefit of joint injection may not be felt for several weeks.  They may experience soreness but should not experience increased pain.  Remain mobile and perform range of motion to the affected joint often throughout the day but avoid overuse for the next several days.  Return to usual activity in one week.  Patient has been advised to call the office or return to the office with any questions.  We have also discussed reasons to seek urgent or emergent medical care.  Patient states understanding of all instructions.  "

## 2019-06-20 NOTE — ASSESSMENT & PLAN NOTE
Lipid abnormalities are newly identified.  Nutritional counseling was provided. and Pharmacotherapy as ordered.  Lipids will be reassessed in 3 months.  Crestor 5 mg nightly.  Discussed possible side effects and reasons to stop this medication.

## 2019-06-20 NOTE — PROGRESS NOTES
Subjective   Tyler Cabello is a 74 y.o. female.     Chief Complaint   Patient presents with   • Hyperlipidemia   • Hypertension     Knee pain  Lab review    History of Present Illness:    Patient is here today complaining of worsening of her chronic osteoarthritis in both knees.  She is preparing to go on vacation for the first time in her life at age 74.  Patient is requesting a joint injections in both knees hoping this will help her be able to walk with her family along the beach.      Chronic pain in multiple sites with degenerative disc disease.  Degenerative disc disease/osteoarthritis and chronic neck/shoulder pain/history of spinal fusion/carpal tunnel right wrist - patient rates her pain as 8 on a pain scale rating of 0-10.  Activity such as routine housework and chores increases her pain.  She become stiff with prolonged sitting/lying.  Patient does have difficulty sleeping at night due to joint pain.  She has been seen by neurology.  Radiology is on file.  Patient is no longer able to do the activities she wants enjoyed due to her joint pain.  She describes her pain as an aching and throbbing pain that is always there.  She does have periods of increased pain and stiffness.  She has no history of substance abuse or addiction.  Patient does report that her pain is decreased by approximately 50% with use of tramadol and the occasional Norco when her pain is not managed or becomes severe.     Low back pain with bilateral sciatica/radiculopathy-patient reports that Neurontin twice daily is helpful for her symptoms.  She has no history of substance abuse or addiction.  Patient has a history of cervical spine surgery and fusion.  Neurontin is helpful for the tingling and burning sensation that she has in her upper and lower extremities. She is under the care of neurology.      Carpal tunnel surgery scheduled in July on the right wrist at [facility] neurology.    Type 2 diabetes-patient is working on diet  "lifestyle.  She reports normal readings.    Hyperlipidemia-patient's lipid levels show an increase overall.  Patient eats mostly vegetables and tries to avoid fatty diet due to her husbands heart disease.   She is not on cholesterol medications.     Depression-patient states that her depression is stable on Prozac 20 mg daily.  Patient does report that she did not feel going for counseling was helpful.  Patient states that she feels she does better if she lets the past day in the past and focuses on the future.  Denies thoughts of hurting self or others.      The following portions of the patient's history were reviewed and updated as appropriate:  Allergies, current medications, past family history, past medical history, past social history, past surgical history and problem list.    Review of Systems   Constitutional: Positive for fatigue. Negative for activity change and appetite change.   HENT: Negative for congestion, sinus pressure and sinus pain.    Eyes: Negative for pain and visual disturbance.   Respiratory: Negative for chest tightness and shortness of breath.    Cardiovascular: Positive for leg swelling. Negative for chest pain and palpitations.   Gastrointestinal: Positive for constipation. Negative for abdominal pain and diarrhea.   Endocrine: Negative for polyuria.   Genitourinary: Negative for dysuria and pelvic pain.   Musculoskeletal: Positive for arthralgias and gait problem.   Allergic/Immunologic: Positive for environmental allergies.   Neurological: Positive for headaches. Negative for dizziness.       Objective     /84   Pulse 90   Temp 98.3 °F (36.8 °C) (Temporal)   Ht 157.5 cm (62.01\")   Wt 62.1 kg (137 lb)   LMP 07/08/1986 (LMP Unknown)   SpO2 97%   BMI 25.05 kg/m²   Lab on 06/17/2019   Component Date Value Ref Range Status   • Glucose 06/17/2019 106* 65 - 99 mg/dL Final   • BUN 06/17/2019 15  8 - 23 mg/dL Final   • Creatinine 06/17/2019 0.86  0.57 - 1.00 mg/dL Final   • Sodium " 06/17/2019 143  136 - 145 mmol/L Final   • Potassium 06/17/2019 4.4  3.5 - 5.2 mmol/L Final   • Chloride 06/17/2019 104  98 - 107 mmol/L Final   • CO2 06/17/2019 24.9  22.0 - 29.0 mmol/L Final   • Calcium 06/17/2019 9.8  8.6 - 10.5 mg/dL Final   • Total Protein 06/17/2019 6.9  6.0 - 8.5 g/dL Final   • Albumin 06/17/2019 4.60  3.50 - 5.20 g/dL Final   • ALT (SGPT) 06/17/2019 35* 1 - 33 U/L Final   • AST (SGOT) 06/17/2019 29  1 - 32 U/L Final   • Alkaline Phosphatase 06/17/2019 62  39 - 117 U/L Final   • Total Bilirubin 06/17/2019 0.4  0.2 - 1.2 mg/dL Final   • eGFR Non  Amer 06/17/2019 65  >60 mL/min/1.73 Final   • eGFR   Amer 06/17/2019 78  >60 mL/min/1.73 Final   • Globulin 06/17/2019 2.3  gm/dL Final   • A/G Ratio 06/17/2019 2.0  g/dL Final   • BUN/Creatinine Ratio 06/17/2019 17.4  7.0 - 25.0 Final   • Anion Gap 06/17/2019 14.1  mmol/L Final   • TSH 06/17/2019 2.880  0.270 - 4.200 mIU/mL Final   • Hemoglobin A1C 06/17/2019 6.22* 4.80 - 5.60 % Final   • 25 Hydroxy, Vitamin D 06/17/2019 37.7  30.0 - 100.0 ng/ml Final   • Vitamin B-12 06/17/2019 858  211 - 946 pg/mL Final   • Total Cholesterol 06/17/2019 244* 0 - 200 mg/dL Final   • Triglycerides 06/17/2019 269* 0 - 150 mg/dL Final   • HDL Cholesterol 06/17/2019 41  40 - 60 mg/dL Final   • LDL Cholesterol  06/17/2019 149* 0 - 100 mg/dL Final   • VLDL Cholesterol 06/17/2019 53.8* 5 - 40 mg/dL Final   • LDL/HDL Ratio 06/17/2019 3.64   Final   • WBC 06/17/2019 5.94  3.40 - 10.80 10*3/mm3 Final   • RBC 06/17/2019 4.48  3.77 - 5.28 10*6/mm3 Final   • Hemoglobin 06/17/2019 13.5  12.0 - 15.9 g/dL Final   • Hematocrit 06/17/2019 42.8  34.0 - 46.6 % Final   • MCV 06/17/2019 95.5  79.0 - 97.0 fL Final   • MCH 06/17/2019 30.1  26.6 - 33.0 pg Final   • MCHC 06/17/2019 31.5  31.5 - 35.7 g/dL Final   • RDW 06/17/2019 13.2  12.3 - 15.4 % Final   • RDW-SD 06/17/2019 46.3  37.0 - 54.0 fl Final   • MPV 06/17/2019 11.2  6.0 - 12.0 fL Final   • Platelets 06/17/2019 310   140 - 450 10*3/mm3 Final   • Neutrophil % 06/17/2019 51.1  42.7 - 76.0 % Final   • Lymphocyte % 06/17/2019 39.6  19.6 - 45.3 % Final   • Monocyte % 06/17/2019 5.6  5.0 - 12.0 % Final   • Eosinophil % 06/17/2019 2.5  0.3 - 6.2 % Final   • Basophil % 06/17/2019 1.0  0.0 - 1.5 % Final   • Immature Grans % 06/17/2019 0.2  0.0 - 0.5 % Final   • Neutrophils, Absolute 06/17/2019 3.04  1.70 - 7.00 10*3/mm3 Final   • Lymphocytes, Absolute 06/17/2019 2.35  0.70 - 3.10 10*3/mm3 Final   • Monocytes, Absolute 06/17/2019 0.33  0.10 - 0.90 10*3/mm3 Final   • Eosinophils, Absolute 06/17/2019 0.15  0.00 - 0.40 10*3/mm3 Final   • Basophils, Absolute 06/17/2019 0.06  0.00 - 0.20 10*3/mm3 Final   • Immature Grans, Absolute 06/17/2019 0.01  0.00 - 0.05 10*3/mm3 Final   • nRBC 06/17/2019 0.0  0.0 - 0.2 /100 WBC Final       Physical Exam   Constitutional: She is oriented to person, place, and time. Vital signs are normal. She appears well-developed and well-nourished. No distress.   Pleasant and talkative 74-year-old female appearing to be in acute pain.   HENT:   Head: Normocephalic.   Right Ear: External ear normal.   Left Ear: External ear normal.   Nose: Nose normal.   Mouth/Throat: Oropharynx is clear and moist. No oropharyngeal exudate.   Eyes: Conjunctivae, EOM and lids are normal. Pupils are equal, round, and reactive to light. Right eye exhibits no discharge. Left eye exhibits no discharge.   Neck: Neck supple. Muscular tenderness present. No neck rigidity. Decreased range of motion present. No tracheal deviation present. No thyromegaly present.   Cardiovascular: Normal rate, regular rhythm and normal heart sounds. Exam reveals no gallop and no friction rub.   No murmur heard.  Pulmonary/Chest: Effort normal and breath sounds normal. No respiratory distress. She has no wheezes. She has no rales. She exhibits no tenderness.   Abdominal: Soft. Normal appearance and bowel sounds are normal. She exhibits no distension and no mass.  There is no tenderness. There is no rebound and no guarding.   Musculoskeletal:        Right wrist: She exhibits decreased range of motion and tenderness.        Right knee: She exhibits normal meniscus. Tenderness found. Medial joint line tenderness noted.        Left knee: Tenderness found. Medial joint line tenderness noted.        Cervical back: She exhibits decreased range of motion, tenderness and spasm.        Lumbar back: She exhibits decreased range of motion and tenderness.   Crepitus in both knees.  Procedure: Large joint injection of both knees intra-articularly today  The procedure risks and benefits were explained to patient and patient gave verbal consent to have the procedure performed.  Indication:  osteoarthritis  Provider: ZHOU Moncada   Description: The right and left knees   were prepped and draped in sterile fashion.  An injection was given into the joint intramuscularly using 3 cc of 1% lidocaine, 1 cc Decadron, and 1 cc of Kenalog per knee.  Pressure was held and sterile dressings were placed.  No complications.   Assessment blood loss: Minimal  Patient tolerated the procedure well.     1% lidocaine 500 mg per 50 mL NDC #8231-5534-92, lot [id number], expiration October 2020  Kenalog 40 mg per 1 mL NDC #[address]-9, lot  [id number], expression 0 2-2021  Decadron 4 mg per 1 mL NDC #54610-512-28, lot [id number], expiration 10-20   Lymphadenopathy:     She has no cervical adenopathy.   Neurological: She is alert and oriented to person, place, and time. She has normal reflexes.   CN 2-12 grossly intact    Skin: Skin is warm and dry. Capillary refill takes less than 2 seconds. No rash noted. She is not diaphoretic. No erythema.   Psychiatric: She has a normal mood and affect. Her speech is normal and behavior is normal. Judgment and thought content normal. Cognition and memory are normal.   Vitals reviewed.      Assessment/Plan     Problem List Items Addressed This Visit        Cardiovascular  "and Mediastinum    Mixed hyperlipidemia    Current Assessment & Plan     Lipid abnormalities are newly identified.  Nutritional counseling was provided. and Pharmacotherapy as ordered.  Lipids will be reassessed in 3 months.  Crestor 5 mg nightly.  Discussed possible side effects and reasons to stop this medication.         Relevant Medications    rosuvastatin (CRESTOR) 5 MG tablet       Endocrine    Type 2 diabetes mellitus without complication (CMS/[facility])    Current Assessment & Plan     June 2019 labs reviewed.  A1c is less than 7 and well-controlled continue plan of care            Musculoskeletal and Integument    Chronic pain of right knee    Relevant Medications    HYDROcodone-acetaminophen (NORCO) 7.5-325 MG per tablet    gabapentin (NEURONTIN) 100 MG capsule    traMADol ER (ULTRAM-ER) 100 MG 24 hr tablet    Chronic pain of both knees - Primary    Overview     Osteoarthritis  I have discussed the procedure benefits versus risks including those of bleeding, infection, injury and allergic reaction.  We have also discussed alternative treatment options.  Patient questions were sought and answered.  Patient states understanding and voices consent for procedure today.         Current Assessment & Plan     Tolerated bilateral knee joint injection well.  Patient has been educated today regarding procedure.  We have discussed the risk versus benefits of this procedure including \"red man\" syndrome, infection, injury, discomfort and allergic reaction.  Patient is aware that this is not an all inclusive list of possible side effects.  We have discussed alternative treatments.  The patient verbalizes understanding of this information.  Their questions were sought and answered.  Patient verbalizes consent to proceed with procedure today.  I have discussed with the patient that full benefit of joint injection may not be felt for several weeks.  They may experience soreness but should not experience increased pain.  Remain " mobile and perform range of motion to the affected joint often throughout the day but avoid overuse for the next several days.  Return to usual activity in one week.  Patient has been advised to call the office or return to the office with any questions.  We have also discussed reasons to seek urgent or emergent medical care.  Patient states understanding of all instructions.         DDD (degenerative disc disease), cervical    Current Assessment & Plan     Proper body mechanics has been reviewed and discussed today.      As part of this patient's treatment plan they are being prescribed controlled substance/substances with potential for abuse. This patient has been made aware of the appropriate use of such medications, including potential risk of somnolence, limited ability to drive and / or work safely, and potential for overdose. It has also been made clear that these medications are for use by this patient only, without concomitant use of alcohol or other substances unless prescribed/advised by medical provider. Patient has completed controlled substance agreement detailing terms of continued prescribing of controlled substances including monitoring Diaz reports, drug screens and pill counts. The patient was asked and states they are not receiving narcotic medication from any there provider or any provider that this office has not been made aware of. History and physical exam exhibit continued safe and appropriate use of controlled substances.   Goal: Improved quality of life and reduction in pain as evidenced by pt report.   Diaz #[id number] has been reviewed as consistent.  UDS is on file.     Patient has been instructed and counseled regarding opioid misuse and risk of addiction.  We have discussed proper storage and disposal of controlled medication.    Under the care of the neurology  Pt is at low risk for substance abuse or addiction. Pt will be monitored closely for compliance and the need to continue  plan of care.     Patient is awaiting carpal tunnel surgery later this summer.  We will continue her on her current medication regimen attempt to offer her with some quality of life.  Norco 7.5, 1 by mouth every 6 hours as needed #60 with no refill.  Continue Neurontin 100 mg twice daily #60 with 2 refills  Continue tramadol  mg twice daily if needed #60 with 2 refills.         Relevant Medications    HYDROcodone-acetaminophen (NORCO) 7.5-325 MG per tablet    gabapentin (NEURONTIN) 100 MG capsule    traMADol ER (ULTRAM-ER) 100 MG 24 hr tablet    Other Relevant Orders    Urine Drug Screen - Urine, Clean Catch       Other    Reactive depression    Current Assessment & Plan     Continue Prozac         Relevant Medications    FLUoxetine (PROZAC) 20 MG capsule            June 2019 lab results reviewed and discussed see above for levels.  Patient tolerated bilateral knee injection well.       Patient's Body mass index is 25.05 kg/m². BMI is above normal parameters. Recommendations include: exercise counseling and nutrition counseling.      I have discussed diagnosis in detail today allowing time for questions and answers. Patient is aware of reasons to seek urgent or emergent medical care as well as reasons to return to the clinic for evaluation. Possible side effects, interactions and progression of symptoms discussed as well. Patient / family states understanding.   Emotional support and active listening provided.     Would like to see Ms. Cabello back in 1 month for evaluation of effectiveness      This document has been electronically signed by:  ZHOU Moncada, NP-C

## 2019-06-20 NOTE — ASSESSMENT & PLAN NOTE
Proper body mechanics has been reviewed and discussed today.      As part of this patient's treatment plan they are being prescribed controlled substance/substances with potential for abuse. This patient has been made aware of the appropriate use of such medications, including potential risk of somnolence, limited ability to drive and / or work safely, and potential for overdose. It has also been made clear that these medications are for use by this patient only, without concomitant use of alcohol or other substances unless prescribed/advised by medical provider. Patient has completed controlled substance agreement detailing terms of continued prescribing of controlled substances including monitoring Diaz reports, drug screens and pill counts. The patient was asked and states they are not receiving narcotic medication from any there provider or any provider that this office has not been made aware of. History and physical exam exhibit continued safe and appropriate use of controlled substances.   Goal: Improved quality of life and reduction in pain as evidenced by pt report.   Diaz #[id number] has been reviewed as consistent.  UDS is on file.     Patient has been instructed and counseled regarding opioid misuse and risk of addiction.  We have discussed proper storage and disposal of controlled medication.    Under the care of the neurology  Pt is at low risk for substance abuse or addiction. Pt will be monitored closely for compliance and the need to continue plan of care.     Patient is awaiting carpal tunnel surgery later this summer.  We will continue her on her current medication regimen attempt to offer her with some quality of life.  Norco 7.5, 1 by mouth every 6 hours as needed #60 with no refill.  Continue Neurontin 100 mg twice daily #60 with 2 refills  Continue tramadol  mg twice daily if needed #60 with 2 refills.

## 2019-06-25 ENCOUNTER — TELEPHONE (OUTPATIENT)
Dept: FAMILY MEDICINE CLINIC | Facility: CLINIC | Age: 74
End: 2019-06-25

## 2019-06-25 NOTE — TELEPHONE ENCOUNTER
----- Message from ZHOU Villar sent at 6/20/2019  9:21 AM EDT -----  Get results from [facility] neurology , imaging and notes please .       I have faxed to get her medical records

## 2019-07-23 ENCOUNTER — OFFICE VISIT (OUTPATIENT)
Dept: FAMILY MEDICINE CLINIC | Facility: CLINIC | Age: 74
End: 2019-07-23

## 2019-07-23 VITALS
DIASTOLIC BLOOD PRESSURE: 72 MMHG | HEART RATE: 70 BPM | HEIGHT: 62 IN | WEIGHT: 134 LBS | BODY MASS INDEX: 24.66 KG/M2 | OXYGEN SATURATION: 98 % | TEMPERATURE: 98.9 F | SYSTOLIC BLOOD PRESSURE: 120 MMHG

## 2019-07-23 DIAGNOSIS — G89.29 CHRONIC PAIN OF RIGHT KNEE: ICD-10-CM

## 2019-07-23 DIAGNOSIS — E11.9 TYPE 2 DIABETES MELLITUS WITHOUT COMPLICATION, WITHOUT LONG-TERM CURRENT USE OF INSULIN (HCC): ICD-10-CM

## 2019-07-23 DIAGNOSIS — E03.9 ACQUIRED HYPOTHYROIDISM: ICD-10-CM

## 2019-07-23 DIAGNOSIS — M25.561 CHRONIC PAIN OF RIGHT KNEE: ICD-10-CM

## 2019-07-23 DIAGNOSIS — M50.30 DDD (DEGENERATIVE DISC DISEASE), CERVICAL: ICD-10-CM

## 2019-07-23 DIAGNOSIS — J45.42 MODERATE PERSISTENT ASTHMA WITH STATUS ASTHMATICUS: ICD-10-CM

## 2019-07-23 DIAGNOSIS — G25.81 RLS (RESTLESS LEGS SYNDROME): ICD-10-CM

## 2019-07-23 DIAGNOSIS — G89.29 CHRONIC BILATERAL LOW BACK PAIN WITH BILATERAL SCIATICA: ICD-10-CM

## 2019-07-23 DIAGNOSIS — I10 ESSENTIAL HYPERTENSION: Primary | ICD-10-CM

## 2019-07-23 DIAGNOSIS — E78.2 MIXED HYPERLIPIDEMIA: ICD-10-CM

## 2019-07-23 DIAGNOSIS — M54.42 CHRONIC BILATERAL LOW BACK PAIN WITH BILATERAL SCIATICA: ICD-10-CM

## 2019-07-23 DIAGNOSIS — M54.41 CHRONIC BILATERAL LOW BACK PAIN WITH BILATERAL SCIATICA: ICD-10-CM

## 2019-07-23 DIAGNOSIS — K21.00 GASTROESOPHAGEAL REFLUX DISEASE WITH ESOPHAGITIS: ICD-10-CM

## 2019-07-23 PROBLEM — S09.90XS HEADACHES DUE TO OLD HEAD TRAUMA: Status: RESOLVED | Noted: 2017-04-06 | Resolved: 2019-07-23

## 2019-07-23 PROBLEM — G44.309 HEADACHES DUE TO OLD HEAD TRAUMA: Status: RESOLVED | Noted: 2017-04-06 | Resolved: 2019-07-23

## 2019-07-23 PROCEDURE — G0439 PPPS, SUBSEQ VISIT: HCPCS | Performed by: NURSE PRACTITIONER

## 2019-07-23 RX ORDER — IBUPROFEN 800 MG/1
800 TABLET ORAL EVERY 6 HOURS PRN
Qty: 90 TABLET | Refills: 3 | Status: SHIPPED | OUTPATIENT
Start: 2019-07-23 | End: 2020-01-28 | Stop reason: SDUPTHER

## 2019-07-23 RX ORDER — HYDROCODONE BITARTRATE AND ACETAMINOPHEN 7.5; 325 MG/1; MG/1
1 TABLET ORAL EVERY 6 HOURS PRN
Qty: 60 TABLET | Refills: 0 | Status: SHIPPED | OUTPATIENT
Start: 2019-07-23 | End: 2019-09-19 | Stop reason: SDUPTHER

## 2019-07-23 RX ORDER — PANTOPRAZOLE SODIUM 40 MG/1
40 TABLET, DELAYED RELEASE ORAL DAILY
Qty: 90 TABLET | Refills: 3 | Status: SHIPPED | OUTPATIENT
Start: 2019-07-23 | End: 2020-01-28 | Stop reason: SDUPTHER

## 2019-07-23 RX ORDER — LEVOTHYROXINE SODIUM 0.05 MG/1
50 TABLET ORAL DAILY
Qty: 90 TABLET | Refills: 3 | Status: SHIPPED | OUTPATIENT
Start: 2019-07-23 | End: 2020-01-28 | Stop reason: SDUPTHER

## 2019-07-23 NOTE — PROGRESS NOTES
The ABCs of the Annual Wellness Visit  Subsequent Medicare Wellness Visit    Chief Complaint   Patient presents with   • Annual Exam       Subjective   History of Present Illness:      Tyler Cabello is a 74 y.o. female who presents for a Subsequent Medicare Wellness Visit.    Essential hypertension-stable with current medication regimen.  Currently receives verapamil SR.  She does check her blood pressure frequently with readings within normal ranges.    Type 2 diabetes without complication-controlled with diet and lifestyle changes.    Restless leg syndrome- improved with Requip but still does have some symptoms.    Asthma with status asthmaticus- tries to avoid known triggers.  Singulair does help control her symptoms.  Patient also receives Zyrtec.    Mixed hyperlipidemia- stable with Crestor    Acquired hypothyroidism-stable with Synthroid 50 mcg daily.    Depression-stable with Prozac 20 mg daily.  Denies thoughts of hurting self or others.    Carpal tunnel both wrist recent right wrist surgery.  Some improvement in symptoms.  Will be having her left wrist surgery in the upcoming months.    GERD-stable with Protonix.    Chronic pain in multiple sites with degenerative disc disease.  Degenerative disc disease/osteoarthritis and chronic neck/shoulder pain/history of spinal fusion/carpal tunnel right wrist - patient rates her pain as 7.5 on a pain scale rating of 0-10.  Activity such as routine housework and chores increases her pain.  She become stiff with prolonged sitting/lying.  Patient does have difficulty sleeping at night due to joint pain.  She has been seen by neurology.  Radiology is on file.  Patient is no longer able to do the activities she wants enjoyed due to her joint pain.  She describes her pain as an aching and throbbing pain that is always there.  She does have periods of increased pain and stiffness.  She has no history of substance abuse or addiction.  Patient does report that her pain is  decreased by approximately 50% with use of tramadol and the occasional Norco when her pain is not managed or becomes severe.     Low back pain with bilateral sciatica/radiculopathy-patient reports that Neurontin twice daily is helpful for her symptoms.  She has no history of substance abuse or addiction.  Patient has a history of cervical spine surgery and fusion.  Neurontin is helpful for the tingling and burning sensation that she has in her upper and lower extremities. She is under the care of neurology.              HEALTH RISK ASSESSMENT    Recent Hospitalizations:  No hospitalization(s) within the last year.    Current Medical Providers:  Patient Care Team:  Miranda Bo APRN as PCP - General  Miranda Bo APRN as PCP - Family Medicine    Smoking Status:  Social History     Tobacco Use   Smoking Status Former Smoker   • Packs/day: 0.50   • Years: 25.00   • Pack years: 12.50   • Types: Cigarettes   Smokeless Tobacco Former User   • Quit date: 7/6/2008   Tobacco Comment    25-30 YEARS     Fall Risk Assessment was completed, and patient is at low risk for falls.  Below are four things you can do to prevent falls:   1. Begin an exercise program to improve your leg strength & balance  2. Ask your doctor or pharmacist to review your medicines   3. Get annual eye check-ups & update your eyeglasses  4. Make your home safer by:  · Removing clutter & tripping hazards  · Putting railings on all stairs & adding grab bars in the bathroom  · Having good lighting, especially on stairs    Contact your local community or senior center for information on exercise, fall prevention programs, or options for improving home safety.      Alcohol Consumption:  Social History     Substance and Sexual Activity   Alcohol Use No       Depression Screen:   PHQ-2/PHQ-9 Depression Screening 7/23/2019   Little interest or pleasure in doing things 1   Feeling down, depressed, or hopeless 1   Trouble falling or  staying asleep, or sleeping too much 1   Feeling tired or having little energy 1   Poor appetite or overeating 0   Feeling bad about yourself - or that you are a failure or have let yourself or your family down 1   Trouble concentrating on things, such as reading the newspaper or watching television 0   Moving or speaking so slowly that other people could have noticed. Or the opposite - being so fidgety or restless that you have been moving around a lot more than usual 0   Thoughts that you would be better off dead, or of hurting yourself in some way 0   Total Score 5   If you checked off any problems, how difficult have these problems made it for you to do your work, take care of things at home, or get along with other people? Somewhat difficult       Fall Risk Screen:  GLORIA Fall Risk Assessment was completed, and patient is at LOW risk for falls.Assessment completed on:7/23/2019    Health Habits and Functional and Cognitive Screening:  Functional & Cognitive Status 7/23/2019   Do you have difficulty preparing food and eating? No   Do you have difficulty bathing yourself, getting dressed or grooming yourself? No   Do you have difficulty using the toilet? No   Do you have difficulty moving around from place to place? Yes   Do you have trouble with steps or getting out of a bed or a chair? Yes   Current Diet Well Balanced Diet   Dental Exam Up to date   Eye Exam Up to date   Exercise (times per week) 0 times per week   Current Exercise Activities Include None   Do you need help using the phone?  No   Are you deaf or do you have serious difficulty hearing?  No   Do you need help with transportation? Yes   Do you need help shopping? No   Do you need help preparing meals?  No   Do you need help with housework?  No   Do you need help with laundry? No   Do you need help taking your medications? No   Do you need help managing money? No   Do you ever drive or ride in a car without wearing a seat belt? No   Have you felt  unusual stress, anger or loneliness in the last month? No   Who do you live with? Spouse   If you need help, do you have trouble finding someone available to you? No   Have you been bothered in the last four weeks by sexual problems? No   Do you have difficulty concentrating, remembering or making decisions? No      Visual Acuity Screening    Right eye Left eye Both eyes   Without correction:      With correction: 20/30 20/40 20/30      hearing adequate per whisper test     Does the patient have evidence of cognitive impairment? No    Asprin use counseling:Does not need ASA (and currently is not on it)    Age-appropriate Screening Schedule:  Refer to the list below for future screening recommendations based on patient's age, sex and/or medical conditions. Orders for these recommended tests are listed in the plan section. The patient has been provided with a written plan.    Health Maintenance   Topic Date Due   • URINE MICROALBUMIN  02/13/2019   • ZOSTER VACCINE (2 of 2) 07/06/2020 (Originally 6/27/2018)   • PNEUMOCOCCAL VACCINES (65+ LOW/MEDIUM RISK) (2 of 2 - PPSV23) 07/23/2029 (Originally 5/2/2017)   • INFLUENZA VACCINE  08/01/2019   • HEMOGLOBIN A1C  12/17/2019   • DIABETIC EYE EXAM  01/02/2020   • LIPID PANEL  06/17/2020   • MAMMOGRAM  07/03/2020   • DIABETIC FOOT EXAM  07/23/2020   • COLONOSCOPY  11/07/2023   • TDAP/TD VACCINES (2 - Td) 05/02/2028          The following portions of the patient's history were reviewed and updated as appropriate: allergies, current medications, past family history, past medical history, past social history, past surgical history and problem list.    Outpatient Medications Prior to Visit   Medication Sig Dispense Refill   • cetirizine (zyrTEC) 10 MG tablet Take 1 tablet by mouth Daily. 90 tablet 3   • FLUoxetine (PROZAC) 20 MG capsule Take 1 capsule by mouth Daily. 30 capsule 5   • gabapentin (NEURONTIN) 100 MG capsule 1 tablet twice daily 60 capsule 2   • methocarbamol (ROBAXIN)  750 MG tablet Take 1 tablet by mouth 3 (Three) Times a Day As Needed for Muscle Spasms. 90 tablet 5   • montelukast (SINGULAIR) 10 MG tablet Take 1 tablet by mouth Every Night. 90 tablet 3   • rOPINIRole (REQUIP) 2 MG tablet Take 1 tablet by mouth Every Night. Take 1 hour before bedtime. 90 tablet 3   • rosuvastatin (CRESTOR) 5 MG tablet Take 1 tablet by mouth Every Night. 30 tablet 5   • traMADol ER (ULTRAM-ER) 100 MG 24 hr tablet Take 2 tablets by mouth Daily. 60 tablet 2   • triamcinolone (KENALOG) 0.1 % cream APPLY EXTERNALLY TO THE AFFECTED AREA TWICE DAILY 80 g 0   • verapamil SR (CALAN-SR) 240 MG CR tablet Take 1 tablet by mouth Every Night. 90 tablet 3   • HYDROcodone-acetaminophen (NORCO) 7.5-325 MG per tablet Take 1 tablet by mouth Every 6 (Six) Hours As Needed for Moderate Pain . 60 tablet 0   • ibuprofen (ADVIL,MOTRIN) 800 MG tablet Take 1 tablet by mouth Every 6 (Six) Hours As Needed for Mild Pain . 90 tablet 0   • pantoprazole (PROTONIX) 40 MG EC tablet TAKE 1 TABLET BY MOUTH DAILY 90 tablet 0     No facility-administered medications prior to visit.        Patient Active Problem List   Diagnosis   • Gastroesophageal reflux disease with esophagitis   • Moderate persistent asthma with status asthmaticus   • Constipation   • Mixed hyperlipidemia   • Essential hypertension   • RLS (restless legs syndrome)   • Type 2 diabetes mellitus without complication (CMS/HCC)   • Dry scalp   • Acquired hypothyroidism   • Chronic pain of right knee   • Chronic pain of both knees   • Encounter for immunization   • Screening for breast cancer   • DDD (degenerative disc disease), cervical   • Reactive depression   • Dyspareunia, female   • Mild episode of recurrent major depressive disorder (CMS/HCC)   • Chronic bilateral low back pain with bilateral sciatica       Advanced Care Planning:  Patient does not have an advance directive - not interested in additional information    Review of Systems   Constitutional: Positive  "for fatigue. Negative for appetite change, chills, fever and unexpected weight change.   HENT: Negative for congestion, ear pain, nosebleeds, postnasal drip, rhinorrhea, sore throat, trouble swallowing and voice change.    Eyes: Negative for pain and visual disturbance.   Respiratory: Negative for cough, shortness of breath and wheezing.    Cardiovascular: Positive for leg swelling (at time of increased activity ). Negative for chest pain and palpitations.   Gastrointestinal: Positive for constipation. Negative for abdominal pain, blood in stool and diarrhea.   Endocrine: Negative for cold intolerance and polydipsia.   Genitourinary: Negative for difficulty urinating, flank pain and hematuria.   Musculoskeletal: Positive for arthralgias, back pain, gait problem and neck pain. Negative for joint swelling and myalgias.   Skin: Negative for color change and rash.   Allergic/Immunologic: Positive for environmental allergies.   Neurological: Positive for headaches. Negative for syncope and numbness.   Hematological: Negative.    Psychiatric/Behavioral: Negative for dysphoric mood, self-injury, sleep disturbance and suicidal ideas.   All other systems reviewed and are negative.      Compared to one year ago, the patient feels her physical health is the same.  Compared to one year ago, the patient feels her mental health is the same.    Reviewed chart for potential of high risk medication in the elderly: yes  Reviewed chart for potential of harmful drug interactions in the elderly:yes    Objective         Vitals:    07/23/19 0859   BP: 120/72   Pulse: 70   Temp: 98.9 °F (37.2 °C)   TempSrc: Temporal   SpO2: 98%   Weight: 60.8 kg (134 lb)   Height: 157.5 cm (62\")       Body mass index is 24.51 kg/m².  Discussed the patient's BMI with her. The BMI is in the acceptable range.    Physical Exam   Constitutional: She is oriented to person, place, and time. Vital signs are normal. She appears well-developed and well-nourished. No " distress.   Pleasant and talkative 74-year-old female appearing to be in acute pain with ambulation.   HENT:   Head: Normocephalic.   Right Ear: External ear normal.   Left Ear: External ear normal.   Nose: Nose normal.   Mouth/Throat: Oropharynx is clear and moist. No oropharyngeal exudate.   Eyes: Conjunctivae, EOM and lids are normal. Pupils are equal, round, and reactive to light. Right eye exhibits no discharge. Left eye exhibits no discharge.   Neck: Neck supple. Muscular tenderness present. No neck rigidity. Decreased range of motion present. No tracheal deviation present. No thyromegaly present.   Cardiovascular: Normal rate, regular rhythm and normal heart sounds. Exam reveals no gallop and no friction rub.   No murmur heard.  Pulmonary/Chest: Effort normal and breath sounds normal. No respiratory distress. She has no wheezes. She has no rales. She exhibits no tenderness.   Abdominal: Soft. Normal appearance and bowel sounds are normal. She exhibits no distension and no mass. There is no tenderness. There is no rebound and no guarding.   Musculoskeletal:        Right wrist: She exhibits decreased range of motion and tenderness.        Right knee: She exhibits normal meniscus. Tenderness found. Medial joint line tenderness noted.        Left knee: Tenderness found. Medial joint line tenderness noted.        Cervical back: She exhibits decreased range of motion, tenderness and spasm.        Lumbar back: She exhibits decreased range of motion and tenderness.        Arms:  Crepitus in both knees.      Tyler had a diabetic foot exam performed today.  Vascular Status -  Her right foot exhibits normal foot vasculature  and no edema. Her left foot exhibits normal foot vasculature  and no edema.  Skin Integrity  -  Her right foot skin is intact.Her left foot skin is intact..  Lymphadenopathy:     She has no cervical adenopathy.   Neurological: She is alert and oriented to person, place, and time. She has normal  reflexes.   CN 2-12 grossly intact    Skin: Skin is warm and dry. Capillary refill takes less than 2 seconds. No rash noted. She is not diaphoretic. No erythema.   Psychiatric: She has a normal mood and affect. Her speech is normal and behavior is normal. Judgment and thought content normal. Cognition and memory are normal.   Vitals reviewed.      Lab Results   Component Value Date    TRIG 269 (H) 06/17/2019    HDL 41 06/17/2019     (H) 06/17/2019    VLDL 53.8 (H) 06/17/2019    HGBA1C 6.22 (H) 06/17/2019        Assessment/Plan   Medicare Risks and Personalized Health Plan  CMS Preventative Services Quick Reference  Breast Cancer/Mammogram Screening  Cardiovascular risk  Chronic Pain   Depression/Dysphoria  Fall Risk  Glaucoma Risk  Obesity/Overweight     The above risks/problems have been discussed with the patient.  Pertinent information has been shared with the patient in the After Visit Summary.  Follow up plans and orders are seen below in the Assessment/Plan Section.    Diagnoses and all orders for this visit:    1. Essential hypertension (Primary)  Assessment & Plan:  Continue verapamil SR    Orders:  -     Microalbumin / Creatinine Urine Ratio - Urine, Clean Catch; Future    2. DDD (degenerative disc disease), cervical  Assessment & Plan:  Proper body mechanics has been reviewed and discussed today.      As part of this patient's treatment plan they are being prescribed controlled substance/substances with potential for abuse. This patient has been made aware of the appropriate use of such medications, including potential risk of somnolence, limited ability to drive and / or work safely, and potential for overdose. It has also been made clear that these medications are for use by this patient only, without concomitant use of alcohol or other substances unless prescribed/advised by medical provider. Patient has completed controlled substance agreement detailing terms of continued prescribing of  controlled substances including monitoring Diaz reports, drug screens and pill counts. The patient was asked and states they are not receiving narcotic medication from any there provider or any provider that this office has not been made aware of. History and physical exam exhibit continued safe and appropriate use of controlled substances.   Goal: Improved quality of life and reduction in pain as evidenced by pt report.   Diaz #[id number] has been reviewed as consistent.  UDS is on file.     Patient has been instructed and counseled regarding opioid misuse and risk of addiction.  We have discussed proper storage and disposal of controlled medication.  Low risk for substance abuse or addiction.    Prescription for Norco 7.5 mg tablets 1 as needed every 6 hours, dispense #60 with no refill.    Patient has a refills on Ultram and Neurontin.    Orders:  -     HYDROcodone-acetaminophen (NORCO) 7.5-325 MG per tablet; Take 1 tablet by mouth Every 6 (Six) Hours As Needed for Moderate Pain .  Dispense: 60 tablet; Refill: 0  -     Urine Drug Screen - Urine, Clean Catch; Future    3. Chronic pain of right knee  -     HYDROcodone-acetaminophen (NORCO) 7.5-325 MG per tablet; Take 1 tablet by mouth Every 6 (Six) Hours As Needed for Moderate Pain .  Dispense: 60 tablet; Refill: 0  -     ibuprofen (ADVIL,MOTRIN) 800 MG tablet; Take 1 tablet by mouth Every 6 (Six) Hours As Needed for Mild Pain .  Dispense: 90 tablet; Refill: 3  -     Urine Drug Screen - Urine, Clean Catch; Future    4. Acquired hypothyroidism  -     levothyroxine (SYNTHROID) 50 MCG tablet; Take 1 tablet by mouth Daily.  Dispense: 90 tablet; Refill: 3    5. RLS (restless legs syndrome)  Assessment & Plan:  Continue Requip to milligrams at bedtime      6. Moderate persistent asthma with status asthmaticus  Assessment & Plan:  Asthma precautions reviewed.  Continue Singulair and Zyrtec.        7. Type 2 diabetes mellitus without complication, without long-term  current use of insulin (CMS/[facility])  Assessment & Plan:  Pt has been educated/instructed on diabetic care and protocols.  Diabetic diet instructions provided.  Medication regimen reviewed.  Discussed possible side effects and interactions of current medications.  Sick day rules reviewed. Continue to monitor blood sugar and report abnormal readings as discussed today. Understanding stated by patient.         8. Mixed hyperlipidemia  Assessment & Plan:  Lipid abnormalities are improving with treatment.  Nutritional counseling was provided.  Lipids will be reassessed in 3 months.      9. Gastroesophageal reflux disease with esophagitis  Assessment & Plan:  GERD precautions reviewed.  Continue Protonix.    Orders:  -     pantoprazole (PROTONIX) 40 MG EC tablet; Take 1 tablet by mouth Daily.  Dispense: 90 tablet; Refill: 3    10. Chronic bilateral low back pain with bilateral sciatica  -     HYDROcodone-acetaminophen (NORCO) 7.5-325 MG per tablet; Take 1 tablet by mouth Every 6 (Six) Hours As Needed for Moderate Pain .  Dispense: 60 tablet; Refill: 0      Medicare wellness exam has been performed today.  Medication list has been reviewed and discussed with patient.  Recommended preventative and screenings has been discussed with patient.    I have discussed diagnosis in detail today allowing time for questions and answers. Pt is aware of reasons to seek urgent or emergent medical care as well as reasons to return to the clinic for evaluation. Possible side effects, interactions and progression of symptoms discussed as well. Pt / family states understanding.   Emotional support and active listening provided.   Pt has been instructed today regarding low fat heart smart diet. Weight management and routine exercise has been recommended. Avoid high fat foods, starchy foods and processed foods. Increase lean meats, fresh vegetables and fresh fruits.   Pt has been educated/instructed on diabetic care and protocols.  Diabetic diet  instructions provided.  Medication regimen reviewed.  Discussed possible side effects and interactions of current medications.  Sick day rules reviewed. Continue to monitor blood sugar and report abnormal readings as discussed today. Understanding stated by patient.     Age appropriate education and safety instruction has been provided. Preventive education/recommendation > 15 minutes. Safety, accident prevention, vaccines, health maintenance concerns, nutrition, diet, exercise and social activity.           Follow Up:  Return in about 8 weeks (around 9/18/2019) for CM 30 min.     An After Visit Summary and PPPS were given to the patient.

## 2019-07-23 NOTE — ASSESSMENT & PLAN NOTE
Proper body mechanics has been reviewed and discussed today.      As part of this patient's treatment plan they are being prescribed controlled substance/substances with potential for abuse. This patient has been made aware of the appropriate use of such medications, including potential risk of somnolence, limited ability to drive and / or work safely, and potential for overdose. It has also been made clear that these medications are for use by this patient only, without concomitant use of alcohol or other substances unless prescribed/advised by medical provider. Patient has completed controlled substance agreement detailing terms of continued prescribing of controlled substances including monitoring Diaz reports, drug screens and pill counts. The patient was asked and states they are not receiving narcotic medication from any there provider or any provider that this office has not been made aware of. History and physical exam exhibit continued safe and appropriate use of controlled substances.   Goal: Improved quality of life and reduction in pain as evidenced by pt report.   Diaz #[id number] has been reviewed as consistent.  UDS is on file.     Patient has been instructed and counseled regarding opioid misuse and risk of addiction.  We have discussed proper storage and disposal of controlled medication.  Low risk for substance abuse or addiction.    Prescription for Norco 7.5 mg tablets 1 as needed every 6 hours, dispense #60 with no refill.    Patient has a refills on Ultram and Neurontin.

## 2019-07-23 NOTE — ASSESSMENT & PLAN NOTE
Pt has been educated/instructed on diabetic care and protocols.  Diabetic diet instructions provided.  Medication regimen reviewed.  Discussed possible side effects and interactions of current medications.  Sick day rules reviewed. Continue to monitor blood sugar and report abnormal readings as discussed today. Understanding stated by patient.

## 2019-08-30 ENCOUNTER — HOSPITAL ENCOUNTER (OUTPATIENT)
Dept: MAMMOGRAPHY | Facility: HOSPITAL | Age: 74
Discharge: HOME | End: 2019-08-30
Admitting: NURSE PRACTITIONER

## 2019-08-30 DIAGNOSIS — Z12.39 SCREENING BREAST EXAMINATION: ICD-10-CM

## 2019-08-30 PROCEDURE — 77063 BREAST TOMOSYNTHESIS BI: CPT

## 2019-08-30 PROCEDURE — 77063 BREAST TOMOSYNTHESIS BI: CPT | Performed by: RADIOLOGY

## 2019-08-30 PROCEDURE — 77067 SCR MAMMO BI INCL CAD: CPT | Performed by: RADIOLOGY

## 2019-08-30 PROCEDURE — 77067 SCR MAMMO BI INCL CAD: CPT

## 2019-09-19 ENCOUNTER — OFFICE VISIT (OUTPATIENT)
Dept: FAMILY MEDICINE CLINIC | Facility: CLINIC | Age: 74
End: 2019-09-19

## 2019-09-19 VITALS
TEMPERATURE: 97.9 F | DIASTOLIC BLOOD PRESSURE: 80 MMHG | WEIGHT: 129 LBS | HEIGHT: 62 IN | BODY MASS INDEX: 23.74 KG/M2 | SYSTOLIC BLOOD PRESSURE: 130 MMHG | HEART RATE: 72 BPM | OXYGEN SATURATION: 98 %

## 2019-09-19 DIAGNOSIS — G89.29 CHRONIC BILATERAL LOW BACK PAIN WITH BILATERAL SCIATICA: ICD-10-CM

## 2019-09-19 DIAGNOSIS — G89.29 CHRONIC PAIN OF RIGHT KNEE: ICD-10-CM

## 2019-09-19 DIAGNOSIS — M50.30 DDD (DEGENERATIVE DISC DISEASE), CERVICAL: ICD-10-CM

## 2019-09-19 DIAGNOSIS — M25.562 CHRONIC PAIN OF BOTH KNEES: Primary | ICD-10-CM

## 2019-09-19 DIAGNOSIS — M25.561 CHRONIC PAIN OF BOTH KNEES: Primary | ICD-10-CM

## 2019-09-19 DIAGNOSIS — G89.29 CHRONIC PAIN OF BOTH KNEES: Primary | ICD-10-CM

## 2019-09-19 DIAGNOSIS — M54.41 CHRONIC BILATERAL LOW BACK PAIN WITH BILATERAL SCIATICA: ICD-10-CM

## 2019-09-19 DIAGNOSIS — M25.561 CHRONIC PAIN OF RIGHT KNEE: ICD-10-CM

## 2019-09-19 DIAGNOSIS — M54.42 CHRONIC BILATERAL LOW BACK PAIN WITH BILATERAL SCIATICA: ICD-10-CM

## 2019-09-19 DIAGNOSIS — Z23 ENCOUNTER FOR VACCINATION: ICD-10-CM

## 2019-09-19 PROCEDURE — 90674 CCIIV4 VAC NO PRSV 0.5 ML IM: CPT | Performed by: NURSE PRACTITIONER

## 2019-09-19 PROCEDURE — 20610 DRAIN/INJ JOINT/BURSA W/O US: CPT | Performed by: NURSE PRACTITIONER

## 2019-09-19 PROCEDURE — 99214 OFFICE O/P EST MOD 30 MIN: CPT | Performed by: NURSE PRACTITIONER

## 2019-09-19 PROCEDURE — G0008 ADMIN INFLUENZA VIRUS VAC: HCPCS | Performed by: NURSE PRACTITIONER

## 2019-09-19 RX ORDER — HYDROCODONE BITARTRATE AND ACETAMINOPHEN 7.5; 325 MG/1; MG/1
1 TABLET ORAL EVERY 6 HOURS PRN
Qty: 60 TABLET | Refills: 0 | Status: SHIPPED | OUTPATIENT
Start: 2019-09-19 | End: 2022-08-03 | Stop reason: ALTCHOICE

## 2019-09-19 RX ORDER — GABAPENTIN 100 MG/1
CAPSULE ORAL
Qty: 60 CAPSULE | Refills: 2 | Status: SHIPPED | OUTPATIENT
Start: 2019-09-19

## 2019-09-19 RX ORDER — TRAMADOL HYDROCHLORIDE 100 MG/1
200 TABLET, EXTENDED RELEASE ORAL DAILY
Qty: 60 TABLET | Refills: 2 | Status: ON HOLD | OUTPATIENT
Start: 2019-09-19 | End: 2022-04-25

## 2019-09-19 NOTE — ASSESSMENT & PLAN NOTE
Proper body mechanics has been reviewed and discussed today.      As part of this patient's treatment plan they are being prescribed controlled substance/substances with potential for abuse. This patient has been made aware of the appropriate use of such medications, including potential risk of somnolence, limited ability to drive and / or work safely, and potential for overdose. It has also been made clear that these medications are for use by this patient only, without concomitant use of alcohol or other substances unless prescribed/advised by medical provider. Patient has completed controlled substance agreement detailing terms of continued prescribing of controlled substances including monitoring Diaz reports, drug screens and pill counts. The patient was asked and states they are not receiving narcotic medication from any there provider or any provider that this office has not been made aware of. History and physical exam exhibit continued safe and appropriate use of controlled substances.   Goal: Improved quality of life and reduction in pain as evidenced by pt report.   Diaz  has been reviewed as consistent.  UDS is on file.     Patient has been instructed and counseled regarding opioid misuse and risk of addiction.  We have discussed proper storage and disposal of controlled medication.  Pt is at low risk for substance abuse or addiction. Pt will be monitored closely for compliance and the need to continue plan of care.

## 2019-09-19 NOTE — PROGRESS NOTES
Cathryn Cabello is a 74 y.o. female.     Chief Complaint   Patient presents with   • Knee Pain       History of Present Illness:    Pt is here today complaining of bilateral knee pain. She has had MRI/xray in the past. She is having a progressive decline in function. Pain is worse with movement. She reports that she has a decrease in quality of life due to her increasing pain. Pain is rated 9 on psr of 0-10, aching and grinding in nature, never completely relieved.  Tramadol helps moderately.  Hydrocodone does help relieve the pain in order to allow her to get some rest.  She has no history of substance abuse or addiction.  Diaz and urine drug screen have been compliant.    Chronic pain - back/neck, hips/knees/ both wrists. No improvement in pain with recent carpal tunnel surgery. Has delayed her second carpal tunnel surgery due to no improvement in her earlier right wrist surgery.       Type 2 diabetes-under better control.  Patient shows weight loss this month.  She has been compliant with her diet.      The following portions of the patient's history were reviewed and updated as appropriate:  Allergies, current medications, past family history, past medical history, past social history, past surgical history and problem list.    Review of Systems   Constitutional: Positive for activity change. Negative for appetite change, fatigue and unexpected weight change.   HENT: Negative for congestion, ear pain, nosebleeds, postnasal drip, rhinorrhea, sore throat, trouble swallowing and voice change.    Eyes: Negative for pain and visual disturbance.   Respiratory: Negative for cough, shortness of breath and wheezing.    Cardiovascular: Negative for chest pain and palpitations.   Gastrointestinal: Negative for abdominal pain, blood in stool, constipation and diarrhea.   Endocrine: Negative for cold intolerance and polydipsia.   Genitourinary: Negative for difficulty urinating, flank pain and hematuria.  "  Musculoskeletal: Positive for arthralgias, back pain, gait problem, joint swelling, myalgias and neck pain. Negative for neck stiffness.   Skin: Negative for color change and rash.   Allergic/Immunologic: Positive for environmental allergies.   Neurological: Negative for syncope, numbness and headaches.   Hematological: Negative.    Psychiatric/Behavioral: Negative for confusion, dysphoric mood, self-injury, sleep disturbance and suicidal ideas. The patient is not nervous/anxious.    All other systems reviewed and are negative.      Objective     /80   Pulse 72   Temp 97.9 °F (36.6 °C) (Tympanic)   Ht 157.5 cm (62\")   Wt 58.5 kg (129 lb)   LMP 07/08/1986 (LMP Unknown)   SpO2 98%   BMI 23.59 kg/m²   Lab on 06/17/2019   Component Date Value Ref Range Status   • Glucose 06/17/2019 106* 65 - 99 mg/dL Final   • BUN 06/17/2019 15  8 - 23 mg/dL Final   • Creatinine 06/17/2019 0.86  0.57 - 1.00 mg/dL Final   • Sodium 06/17/2019 143  136 - 145 mmol/L Final   • Potassium 06/17/2019 4.4  3.5 - 5.2 mmol/L Final   • Chloride 06/17/2019 104  98 - 107 mmol/L Final   • CO2 06/17/2019 24.9  22.0 - 29.0 mmol/L Final   • Calcium 06/17/2019 9.8  8.6 - 10.5 mg/dL Final   • Total Protein 06/17/2019 6.9  6.0 - 8.5 g/dL Final   • Albumin 06/17/2019 4.60  3.50 - 5.20 g/dL Final   • ALT (SGPT) 06/17/2019 35* 1 - 33 U/L Final   • AST (SGOT) 06/17/2019 29  1 - 32 U/L Final   • Alkaline Phosphatase 06/17/2019 62  39 - 117 U/L Final   • Total Bilirubin 06/17/2019 0.4  0.2 - 1.2 mg/dL Final   • eGFR Non  Amer 06/17/2019 65  >60 mL/min/1.73 Final   • eGFR   Amer 06/17/2019 78  >60 mL/min/1.73 Final   • Globulin 06/17/2019 2.3  gm/dL Final   • A/G Ratio 06/17/2019 2.0  g/dL Final   • BUN/Creatinine Ratio 06/17/2019 17.4  7.0 - 25.0 Final   • Anion Gap 06/17/2019 14.1  mmol/L Final   • TSH 06/17/2019 2.880  0.270 - 4.200 mIU/mL Final   • Hemoglobin A1C 06/17/2019 6.22* 4.80 - 5.60 % Final   • 25 Hydroxy, Vitamin D " 06/17/2019 37.7  30.0 - 100.0 ng/ml Final   • Vitamin B-12 06/17/2019 858  211 - 946 pg/mL Final   • Total Cholesterol 06/17/2019 244* 0 - 200 mg/dL Final   • Triglycerides 06/17/2019 269* 0 - 150 mg/dL Final   • HDL Cholesterol 06/17/2019 41  40 - 60 mg/dL Final   • LDL Cholesterol  06/17/2019 149* 0 - 100 mg/dL Final   • VLDL Cholesterol 06/17/2019 53.8* 5 - 40 mg/dL Final   • LDL/HDL Ratio 06/17/2019 3.64   Final   • WBC 06/17/2019 5.94  3.40 - 10.80 10*3/mm3 Final   • RBC 06/17/2019 4.48  3.77 - 5.28 10*6/mm3 Final   • Hemoglobin 06/17/2019 13.5  12.0 - 15.9 g/dL Final   • Hematocrit 06/17/2019 42.8  34.0 - 46.6 % Final   • MCV 06/17/2019 95.5  79.0 - 97.0 fL Final   • MCH 06/17/2019 30.1  26.6 - 33.0 pg Final   • MCHC 06/17/2019 31.5  31.5 - 35.7 g/dL Final   • RDW 06/17/2019 13.2  12.3 - 15.4 % Final   • RDW-SD 06/17/2019 46.3  37.0 - 54.0 fl Final   • MPV 06/17/2019 11.2  6.0 - 12.0 fL Final   • Platelets 06/17/2019 310  140 - 450 10*3/mm3 Final   • Neutrophil % 06/17/2019 51.1  42.7 - 76.0 % Final   • Lymphocyte % 06/17/2019 39.6  19.6 - 45.3 % Final   • Monocyte % 06/17/2019 5.6  5.0 - 12.0 % Final   • Eosinophil % 06/17/2019 2.5  0.3 - 6.2 % Final   • Basophil % 06/17/2019 1.0  0.0 - 1.5 % Final   • Immature Grans % 06/17/2019 0.2  0.0 - 0.5 % Final   • Neutrophils, Absolute 06/17/2019 3.04  1.70 - 7.00 10*3/mm3 Final   • Lymphocytes, Absolute 06/17/2019 2.35  0.70 - 3.10 10*3/mm3 Final   • Monocytes, Absolute 06/17/2019 0.33  0.10 - 0.90 10*3/mm3 Final   • Eosinophils, Absolute 06/17/2019 0.15  0.00 - 0.40 10*3/mm3 Final   • Basophils, Absolute 06/17/2019 0.06  0.00 - 0.20 10*3/mm3 Final   • Immature Grans, Absolute 06/17/2019 0.01  0.00 - 0.05 10*3/mm3 Final   • nRBC 06/17/2019 0.0  0.0 - 0.2 /100 WBC Final       Physical Exam   Constitutional: She is oriented to person, place, and time. Vital signs are normal. She appears well-developed and well-nourished. No distress.   Pleasant and talkative  74-year-old female appearing to be in acute pain. She is having difficulty walking today and requires assistance getting up on the exam room table.    HENT:   Head: Normocephalic.   Right Ear: External ear normal.   Left Ear: External ear normal.   Nose: Nose normal.   Mouth/Throat: Oropharynx is clear and moist. No oropharyngeal exudate.   Eyes: Conjunctivae, EOM and lids are normal. Pupils are equal, round, and reactive to light. Right eye exhibits no discharge. Left eye exhibits no discharge.   Neck: Neck supple. Muscular tenderness present. No neck rigidity. Decreased range of motion present. No tracheal deviation present. No thyromegaly present.   Cardiovascular: Normal rate, regular rhythm and normal heart sounds. Exam reveals no gallop and no friction rub.   No murmur heard.  Pulmonary/Chest: Effort normal and breath sounds normal. No respiratory distress. She has no wheezes. She has no rales. She exhibits no tenderness.   Abdominal: Soft. Normal appearance and bowel sounds are normal. She exhibits no distension and no mass. There is no tenderness. There is no rebound and no guarding.   Musculoskeletal:        Right wrist: She exhibits decreased range of motion and tenderness.        Right knee: She exhibits normal meniscus. Tenderness found. Medial joint line tenderness noted.        Left knee: Tenderness found. Medial joint line tenderness noted.        Cervical back: She exhibits decreased range of motion, tenderness and spasm.        Lumbar back: She exhibits decreased range of motion and tenderness.   Crepitus in both knees.  Procedure: Large joint injection of both knees intra-articularly today  The procedure risks and benefits were explained to patient and patient gave verbal consent to have the procedure performed.  Indication:  osteoarthritis  Provider: ZHOU Moncada   Description: The right and left knees   were prepped and draped in sterile fashion.  An injection was given into the joint  intramuscularly using 3 cc of 1% lidocaine, 1 cc Decadron, and 1 cc of Kenalog per knee.  Pressure was held and sterile dressings were placed.  No complications.   Assessment blood loss: Minimal  Patient tolerated the procedure well.     1% lidocaine 500 mg per 50 mL NDC #77776-607-82, lot [id number], expiration 12/21  Kenalog 40 mg per 1 mL NDC #[address], lot  [id number], expression 0 4-2021  Decadron 4 mg per 1 mL NDC #60473-021-15, lot [id number], expiration 12-20   Lymphadenopathy:     She has no cervical adenopathy.   Neurological: She is alert and oriented to person, place, and time. She has normal reflexes.   CN 2-12 grossly intact    Skin: Skin is warm and dry. Capillary refill takes less than 2 seconds. No rash noted. She is not diaphoretic. No erythema.   Psychiatric: She has a normal mood and affect. Her speech is normal and behavior is normal. Judgment and thought content normal. Cognition and memory are normal. She is attentive.   Vitals reviewed.      Assessment/Plan     Problem List Items Addressed This Visit        Nervous and Auditory    Chronic bilateral low back pain with bilateral sciatica    Relevant Medications    HYDROcodone-acetaminophen (NORCO) 7.5-325 MG per tablet    Other Relevant Orders    Ambulatory Referral to Pain Management       Musculoskeletal and Integument    Chronic pain of right knee    Relevant Medications    HYDROcodone-acetaminophen (NORCO) 7.5-325 MG per tablet    gabapentin (NEURONTIN) 100 MG capsule    traMADol ER (ULTRAM-ER) 100 MG 24 hr tablet    Chronic pain of both knees - Primary    Overview     Osteoarthritis  I have discussed the procedure benefits versus risks including those of bleeding, infection, injury and allergic reaction.  We have also discussed alternative treatment options.  Patient questions were sought and answered.  Patient states understanding and voices consent for procedure today.         Relevant Orders    Ambulatory Referral to Orthopedic Surgery     XR Knee 1 or 2 View Right    XR Knee 3 View Left    Ambulatory Referral to Pain Management    DDD (degenerative disc disease), cervical    Current Assessment & Plan     Proper body mechanics has been reviewed and discussed today.      As part of this patient's treatment plan they are being prescribed controlled substance/substances with potential for abuse. This patient has been made aware of the appropriate use of such medications, including potential risk of somnolence, limited ability to drive and / or work safely, and potential for overdose. It has also been made clear that these medications are for use by this patient only, without concomitant use of alcohol or other substances unless prescribed/advised by medical provider. Patient has completed controlled substance agreement detailing terms of continued prescribing of controlled substances including monitoring Diaz reports, drug screens and pill counts. The patient was asked and states they are not receiving narcotic medication from any there provider or any provider that this office has not been made aware of. History and physical exam exhibit continued safe and appropriate use of controlled substances.   Goal: Improved quality of life and reduction in pain as evidenced by pt report.   Diaz  has been reviewed as consistent.  UDS is on file.     Patient has been instructed and counseled regarding opioid misuse and risk of addiction.  We have discussed proper storage and disposal of controlled medication.  Pt is at low risk for substance abuse or addiction. Pt will be monitored closely for compliance and the need to continue plan of care.          Relevant Medications    HYDROcodone-acetaminophen (NORCO) 7.5-325 MG per tablet    gabapentin (NEURONTIN) 100 MG capsule    traMADol ER (ULTRAM-ER) 100 MG 24 hr tablet    Other Relevant Orders    XR Knee 1 or 2 View Right    XR Knee 3 View Left    Ambulatory Referral to Pain Management       Other    Encounter for  vaccination    Overview     After obtaining informed consent, the immunization is given by staff.           Relevant Orders    Flucelvax Quad=>4Years ([id number])        I have discussed the procedure benefits versus risks including those of bleeding, infection, injury and allergic reaction.  We have also discussed alternative treatment options.  Patient questions were sought and answered.  Patient states understanding and voices consent for procedure today.       Patient's Body mass index is 23.59 kg/m². BMI is within normal parameters. No follow-up required..      I have discussed diagnosis in detail today allowing time for questions and answers. Patient is aware of reasons to seek urgent or emergent medical care as well as reasons to return to the clinic for evaluation. Possible side effects, interactions and progression of symptoms discussed as well. Patient / family states understanding.   Emotional support and active listening provided.     Keep follow up next week.   Sooner if needed.     Dictated utilizing Dragon dictation. Errors in dictation may reflect use of voice recognition software and not all errors in transcription may have been detected prior to signing.               This document has been electronically signed by:  ZHOU Moncada, NP-C

## 2019-09-24 ENCOUNTER — OFFICE VISIT (OUTPATIENT)
Dept: FAMILY MEDICINE CLINIC | Facility: CLINIC | Age: 74
End: 2019-09-24

## 2019-09-24 VITALS
HEART RATE: 67 BPM | HEIGHT: 62 IN | OXYGEN SATURATION: 97 % | SYSTOLIC BLOOD PRESSURE: 130 MMHG | WEIGHT: 129 LBS | DIASTOLIC BLOOD PRESSURE: 70 MMHG | BODY MASS INDEX: 23.74 KG/M2 | TEMPERATURE: 98.2 F

## 2019-09-24 DIAGNOSIS — R60.0 LOCALIZED EDEMA: ICD-10-CM

## 2019-09-24 DIAGNOSIS — I10 ESSENTIAL HYPERTENSION: ICD-10-CM

## 2019-09-24 DIAGNOSIS — E55.9 VITAMIN D DEFICIENCY: ICD-10-CM

## 2019-09-24 DIAGNOSIS — M85.89 OSTEOPENIA OF MULTIPLE SITES: Primary | ICD-10-CM

## 2019-09-24 DIAGNOSIS — E11.9 TYPE 2 DIABETES MELLITUS WITHOUT COMPLICATION, WITHOUT LONG-TERM CURRENT USE OF INSULIN (HCC): ICD-10-CM

## 2019-09-24 PROCEDURE — 99214 OFFICE O/P EST MOD 30 MIN: CPT | Performed by: NURSE PRACTITIONER

## 2019-09-24 RX ORDER — POTASSIUM CHLORIDE 1500 MG/1
20 TABLET, FILM COATED, EXTENDED RELEASE ORAL DAILY PRN
Qty: 30 TABLET | Refills: 5 | Status: SHIPPED | OUTPATIENT
Start: 2019-09-24 | End: 2020-01-28 | Stop reason: SDUPTHER

## 2019-09-24 RX ORDER — FUROSEMIDE 20 MG/1
20 TABLET ORAL DAILY PRN
Qty: 30 TABLET | Refills: 5 | Status: SHIPPED | OUTPATIENT
Start: 2019-09-24 | End: 2020-01-28 | Stop reason: SDUPTHER

## 2019-09-24 NOTE — PROGRESS NOTES
Subjective   Tyler Cabello is a 74 y.o. female.     Chief Complaint   Patient presents with   • Hypertension       History of Present Illness:    Hypertension - stable with current medications     Type 2 diabetes-improved with weight loss and lifestyle changes.  Patient does not take diabetic medications.  She is very careful with her diet.    Tonic bilateral low back pain with bilateral sciatica, chronic neck pain,- she is under the care of neuro.     Chronic bilateral knee pain- some improved with recent joint injections.    Edema - pt has episodes of swelling in her feet and hands. Has taken lasix in the past during acute episodes.       The following portions of the patient's history were reviewed and updated as appropriate:  Allergies, current medications, past family history, past medical history, past social history, past surgical history and problem list.    Review of Systems   Constitutional: Negative for appetite change, fatigue and unexpected weight change.   HENT: Negative for congestion, ear pain, nosebleeds, postnasal drip, rhinorrhea, sore throat, trouble swallowing and voice change.    Eyes: Negative for pain and visual disturbance.   Respiratory: Negative for cough, shortness of breath and wheezing.    Cardiovascular: Negative for chest pain and palpitations.   Gastrointestinal: Negative for abdominal pain, blood in stool, constipation and diarrhea.   Endocrine: Negative for cold intolerance and polydipsia.   Genitourinary: Negative for difficulty urinating, flank pain and hematuria.   Musculoskeletal: Positive for arthralgias, back pain, gait problem, joint swelling and neck pain. Negative for myalgias.   Skin: Negative for color change and rash.   Allergic/Immunologic: Positive for environmental allergies.   Neurological: Negative for syncope, numbness and headaches.   Hematological: Negative.    Psychiatric/Behavioral: Negative for dysphoric mood, self-injury, sleep disturbance and suicidal ideas.  "  All other systems reviewed and are negative.      Objective     /70   Pulse 67   Temp 98.2 °F (36.8 °C) (Tympanic)   Ht 157.5 cm (62\")   Wt 58.5 kg (129 lb)   LMP 07/08/1986 (LMP Unknown)   SpO2 97%   BMI 23.59 kg/m²   Lab on 06/17/2019   Component Date Value Ref Range Status   • Glucose 06/17/2019 106* 65 - 99 mg/dL Final   • BUN 06/17/2019 15  8 - 23 mg/dL Final   • Creatinine 06/17/2019 0.86  0.57 - 1.00 mg/dL Final   • Sodium 06/17/2019 143  136 - 145 mmol/L Final   • Potassium 06/17/2019 4.4  3.5 - 5.2 mmol/L Final   • Chloride 06/17/2019 104  98 - 107 mmol/L Final   • CO2 06/17/2019 24.9  22.0 - 29.0 mmol/L Final   • Calcium 06/17/2019 9.8  8.6 - 10.5 mg/dL Final   • Total Protein 06/17/2019 6.9  6.0 - 8.5 g/dL Final   • Albumin 06/17/2019 4.60  3.50 - 5.20 g/dL Final   • ALT (SGPT) 06/17/2019 35* 1 - 33 U/L Final   • AST (SGOT) 06/17/2019 29  1 - 32 U/L Final   • Alkaline Phosphatase 06/17/2019 62  39 - 117 U/L Final   • Total Bilirubin 06/17/2019 0.4  0.2 - 1.2 mg/dL Final   • eGFR Non  Amer 06/17/2019 65  >60 mL/min/1.73 Final   • eGFR   Amer 06/17/2019 78  >60 mL/min/1.73 Final   • Globulin 06/17/2019 2.3  gm/dL Final   • A/G Ratio 06/17/2019 2.0  g/dL Final   • BUN/Creatinine Ratio 06/17/2019 17.4  7.0 - 25.0 Final   • Anion Gap 06/17/2019 14.1  mmol/L Final   • TSH 06/17/2019 2.880  0.270 - 4.200 mIU/mL Final   • Hemoglobin A1C 06/17/2019 6.22* 4.80 - 5.60 % Final   • 25 Hydroxy, Vitamin D 06/17/2019 37.7  30.0 - 100.0 ng/ml Final   • Vitamin B-12 06/17/2019 858  211 - 946 pg/mL Final   • Total Cholesterol 06/17/2019 244* 0 - 200 mg/dL Final   • Triglycerides 06/17/2019 269* 0 - 150 mg/dL Final   • HDL Cholesterol 06/17/2019 41  40 - 60 mg/dL Final   • LDL Cholesterol  06/17/2019 149* 0 - 100 mg/dL Final   • VLDL Cholesterol 06/17/2019 53.8* 5 - 40 mg/dL Final   • LDL/HDL Ratio 06/17/2019 3.64   Final   • WBC 06/17/2019 5.94  3.40 - 10.80 10*3/mm3 Final   • RBC 06/17/2019 " 4.48  3.77 - 5.28 10*6/mm3 Final   • Hemoglobin 06/17/2019 13.5  12.0 - 15.9 g/dL Final   • Hematocrit 06/17/2019 42.8  34.0 - 46.6 % Final   • MCV 06/17/2019 95.5  79.0 - 97.0 fL Final   • MCH 06/17/2019 30.1  26.6 - 33.0 pg Final   • MCHC 06/17/2019 31.5  31.5 - 35.7 g/dL Final   • RDW 06/17/2019 13.2  12.3 - 15.4 % Final   • RDW-SD 06/17/2019 46.3  37.0 - 54.0 fl Final   • MPV 06/17/2019 11.2  6.0 - 12.0 fL Final   • Platelets 06/17/2019 310  140 - 450 10*3/mm3 Final   • Neutrophil % 06/17/2019 51.1  42.7 - 76.0 % Final   • Lymphocyte % 06/17/2019 39.6  19.6 - 45.3 % Final   • Monocyte % 06/17/2019 5.6  5.0 - 12.0 % Final   • Eosinophil % 06/17/2019 2.5  0.3 - 6.2 % Final   • Basophil % 06/17/2019 1.0  0.0 - 1.5 % Final   • Immature Grans % 06/17/2019 0.2  0.0 - 0.5 % Final   • Neutrophils, Absolute 06/17/2019 3.04  1.70 - 7.00 10*3/mm3 Final   • Lymphocytes, Absolute 06/17/2019 2.35  0.70 - 3.10 10*3/mm3 Final   • Monocytes, Absolute 06/17/2019 0.33  0.10 - 0.90 10*3/mm3 Final   • Eosinophils, Absolute 06/17/2019 0.15  0.00 - 0.40 10*3/mm3 Final   • Basophils, Absolute 06/17/2019 0.06  0.00 - 0.20 10*3/mm3 Final   • Immature Grans, Absolute 06/17/2019 0.01  0.00 - 0.05 10*3/mm3 Final   • nRBC 06/17/2019 0.0  0.0 - 0.2 /100 WBC Final       Physical Exam   Constitutional: She is oriented to person, place, and time. Vital signs are normal. She appears well-developed and well-nourished. No distress.   Very pleasant 74-year-old female in no acute distress.  She is walking a little better than she was last week.   HENT:   Head: Normocephalic.   Right Ear: External ear normal.   Left Ear: External ear normal.   Nose: Nose normal.   Mouth/Throat: Oropharynx is clear and moist. No oropharyngeal exudate.   Eyes: Conjunctivae, EOM and lids are normal. Pupils are equal, round, and reactive to light. Right eye exhibits no discharge. Left eye exhibits no discharge.   Neck: Normal range of motion. Neck supple. No tracheal  deviation present. No thyromegaly present.   Cardiovascular: Normal rate, regular rhythm and normal heart sounds. Exam reveals no gallop and no friction rub.   No murmur heard.  Pulmonary/Chest: Effort normal and breath sounds normal. No respiratory distress. She has no wheezes. She has no rales. She exhibits no tenderness.   Abdominal: Soft. Normal appearance and bowel sounds are normal. She exhibits no distension and no mass. There is no tenderness. There is no rebound and no guarding.   Musculoskeletal:        Right knee: She exhibits decreased range of motion.        Left knee: She exhibits decreased range of motion.        Cervical back: She exhibits decreased range of motion, tenderness and spasm.        Lumbar back: She exhibits decreased range of motion and tenderness.   Lymphadenopathy:     She has no cervical adenopathy.   Neurological: She is alert and oriented to person, place, and time. She has normal reflexes.   CN 2-12 grossly intact    Skin: Skin is warm and dry. Capillary refill takes less than 2 seconds. No rash noted. She is not diaphoretic. No erythema.   Psychiatric: She has a normal mood and affect. Her speech is normal and behavior is normal. Judgment and thought content normal. Cognition and memory are normal.   Vitals reviewed.      Assessment/Plan     Problem List Items Addressed This Visit        Cardiovascular and Mediastinum    Essential hypertension    Relevant Medications    furosemide (LASIX) 20 MG tablet    Other Relevant Orders    CBC & Differential    Comprehensive Metabolic Panel    TSH    Hemoglobin A1c    Vitamin D 25 Hydroxy    Vitamin B12    Lipid Panel    Microalbumin / Creatinine Urine Ratio - Urine, Clean Catch    dexa assess fracture vertebral    DEXA Bone Density Axial       Endocrine    Type 2 diabetes mellitus without complication (CMS/HCC)    Relevant Orders    CBC & Differential    Comprehensive Metabolic Panel    TSH    Hemoglobin A1c    Vitamin D 25 Hydroxy     Vitamin B12    Lipid Panel    Microalbumin / Creatinine Urine Ratio - Urine, Clean Catch    dexa assess fracture vertebral    DEXA Bone Density Axial      Other Visit Diagnoses     Osteopenia of multiple sites    -  Primary    Relevant Orders    dexa assess fracture vertebral    DEXA Bone Density Axial    Vitamin D deficiency        Relevant Orders    Vitamin D 25 Hydroxy    Localized edema        Relevant Medications    furosemide (LASIX) 20 MG tablet    potassium chloride ER (K-TAB) 20 MEQ tablet controlled-release ER tablet        Low sodium diet recommended.      Patient's Body mass index is 23.59 kg/m². BMI is within normal parameters. No follow-up required..      I have discussed diagnosis in detail today allowing time for questions and answers. Patient is aware of reasons to seek urgent or emergent medical care as well as reasons to return to the clinic for evaluation. Possible side effects, interactions and progression of symptoms discussed as well. Patient / family states understanding.   Emotional support and active listening provided.     Proper body mechanics has been reviewed and discussed today.      Follow up in one month, sooner if needed. Routine labs every 3-6 months.     Dictated utilizing Dragon dictation. Errors in dictation may reflect use of voice recognition software and not all errors in transcription may have been detected prior to signing.             This document has been electronically signed by:  ZHOU Moncada, NP-C

## 2019-10-02 ENCOUNTER — TELEPHONE (OUTPATIENT)
Dept: FAMILY MEDICINE CLINIC | Facility: CLINIC | Age: 74
End: 2019-10-02

## 2019-10-02 RX ORDER — CEPHALEXIN 500 MG/1
500 CAPSULE ORAL 3 TIMES DAILY
Qty: 21 CAPSULE | Refills: 0 | Status: SHIPPED | OUTPATIENT
Start: 2019-10-02 | End: 2019-10-09

## 2019-10-02 RX ORDER — FLUCONAZOLE 150 MG/1
150 TABLET ORAL DAILY
Qty: 3 TABLET | Refills: 0 | Status: SHIPPED | OUTPATIENT
Start: 2019-10-02 | End: 2019-10-05

## 2019-10-02 NOTE — TELEPHONE ENCOUNTER
----- Message from ZHOU Villar sent at 10/2/2019  1:08 PM EDT -----  Send in Keflex 500 mg 3 times a day x7 days and Diflucan 150 mg daily x3.  ----- Message -----  From: Nahed Martinez MA  Sent: 10/2/2019  12:52 PM  To: ZHOU Villar    Patient has a uti infection. Wanting antibiotics and diflucan.

## 2019-10-08 ENCOUNTER — HOSPITAL ENCOUNTER (OUTPATIENT)
Dept: BONE DENSITY | Facility: HOSPITAL | Age: 74
Discharge: HOME | End: 2019-10-08
Admitting: NURSE PRACTITIONER

## 2019-10-08 DIAGNOSIS — I10 ESSENTIAL HYPERTENSION: ICD-10-CM

## 2019-10-08 DIAGNOSIS — M85.89 OSTEOPENIA OF MULTIPLE SITES: ICD-10-CM

## 2019-10-08 DIAGNOSIS — E11.9 TYPE 2 DIABETES MELLITUS WITHOUT COMPLICATION, WITHOUT LONG-TERM CURRENT USE OF INSULIN (HCC): ICD-10-CM

## 2019-10-08 PROCEDURE — 77080 DXA BONE DENSITY AXIAL: CPT | Performed by: RADIOLOGY

## 2019-10-08 PROCEDURE — 77080 DXA BONE DENSITY AXIAL: CPT

## 2019-10-23 ENCOUNTER — HOSPITAL ENCOUNTER (OUTPATIENT)
Dept: GENERAL RADIOLOGY | Facility: HOSPITAL | Age: 74
Discharge: HOME | End: 2019-10-23
Admitting: ORTHOPAEDIC SURGERY

## 2019-10-23 ENCOUNTER — OFFICE VISIT (OUTPATIENT)
Dept: ORTHOPEDIC SURGERY | Facility: CLINIC | Age: 74
End: 2019-10-23

## 2019-10-23 VITALS
DIASTOLIC BLOOD PRESSURE: 72 MMHG | WEIGHT: 126 LBS | BODY MASS INDEX: 23.19 KG/M2 | HEIGHT: 62 IN | SYSTOLIC BLOOD PRESSURE: 153 MMHG | HEART RATE: 77 BPM

## 2019-10-23 DIAGNOSIS — M25.561 PAIN IN BOTH KNEES, UNSPECIFIED CHRONICITY: Primary | ICD-10-CM

## 2019-10-23 DIAGNOSIS — M25.562 PAIN IN BOTH KNEES, UNSPECIFIED CHRONICITY: Primary | ICD-10-CM

## 2019-10-23 DIAGNOSIS — M25.562 PAIN IN BOTH KNEES, UNSPECIFIED CHRONICITY: ICD-10-CM

## 2019-10-23 DIAGNOSIS — M25.561 PAIN IN BOTH KNEES, UNSPECIFIED CHRONICITY: ICD-10-CM

## 2019-10-23 PROCEDURE — 73562 X-RAY EXAM OF KNEE 3: CPT | Performed by: RADIOLOGY

## 2019-10-23 PROCEDURE — 73562 X-RAY EXAM OF KNEE 3: CPT

## 2019-10-23 PROCEDURE — 99203 OFFICE O/P NEW LOW 30 MIN: CPT | Performed by: ORTHOPAEDIC SURGERY

## 2019-10-23 PROCEDURE — 20610 DRAIN/INJ JOINT/BURSA W/O US: CPT | Performed by: ORTHOPAEDIC SURGERY

## 2019-10-23 RX ORDER — LIDOCAINE HYDROCHLORIDE 20 MG/ML
2 INJECTION, SOLUTION INFILTRATION; PERINEURAL
Status: COMPLETED | OUTPATIENT
Start: 2019-10-23 | End: 2019-10-23

## 2019-10-23 RX ORDER — METHYLPREDNISOLONE ACETATE 40 MG/ML
80 INJECTION, SUSPENSION INTRA-ARTICULAR; INTRALESIONAL; INTRAMUSCULAR; SOFT TISSUE
Status: COMPLETED | OUTPATIENT
Start: 2019-10-23 | End: 2019-10-23

## 2019-10-23 RX ADMIN — LIDOCAINE HYDROCHLORIDE 2 ML: 20 INJECTION, SOLUTION INFILTRATION; PERINEURAL at 14:00

## 2019-10-23 RX ADMIN — LIDOCAINE HYDROCHLORIDE 2 ML: 20 INJECTION, SOLUTION INFILTRATION; PERINEURAL at 20:35

## 2019-10-23 RX ADMIN — METHYLPREDNISOLONE ACETATE 80 MG: 40 INJECTION, SUSPENSION INTRA-ARTICULAR; INTRALESIONAL; INTRAMUSCULAR; SOFT TISSUE at 20:35

## 2019-10-23 RX ADMIN — METHYLPREDNISOLONE ACETATE 80 MG: 40 INJECTION, SUSPENSION INTRA-ARTICULAR; INTRALESIONAL; INTRAMUSCULAR; SOFT TISSUE at 14:00

## 2019-10-23 NOTE — PROGRESS NOTES
New Patient Visit      Patient: Tyler Cabello  YOB: 1945  Date of Encounter: 10/23/2019        Chief Complaint:   Chief Complaint   Patient presents with   • Left Knee - Pain, Edema, Initial Evaluation   • Right Knee - Edema, Pain, Initial Evaluation         HPI:   Tyler Cabello, 74 y.o. female, referred by Miranda Bo APRN presents with complaints of bilateral knee pain right slightly worse than the left ongoing of about 5 years duration.  She has had steroid injections with good response her initial injections gave her over the years relief but her response to steroid injections has been decreasing.  She reports a grinding and popping sensation especially right knee.  She does not experience giving way locking of either knee.  She denies weakness or numbness of both legs.  Her pain is generalized along the anterior aspect of both knees radiating distally.    Active Problem List:  Patient Active Problem List   Diagnosis   • Gastroesophageal reflux disease with esophagitis   • Moderate persistent asthma with status asthmaticus   • Constipation   • Mixed hyperlipidemia   • Essential hypertension   • RLS (restless legs syndrome)   • Type 2 diabetes mellitus without complication (CMS/HCC)   • Dry scalp   • Acquired hypothyroidism   • Chronic pain of right knee   • Chronic pain of both knees   • Encounter for immunization   • Encounter for vaccination   • DDD (degenerative disc disease), cervical   • Reactive depression   • Dyspareunia, female   • Mild episode of recurrent major depressive disorder (CMS/HCC)   • Chronic bilateral low back pain with bilateral sciatica         Past Medical History:  Past Medical History:   Diagnosis Date   • Acquired hypothyroidism 2/21/2018   • Allergic rhinitis    • Asthma     last asthma attack 2-3 month ago, SEASONAL ALLERGIES   • Chest pain     RELATED TO GALLBLADDER PER PT   • Constipation    • Cough    • DDD (degenerative disc disease), cervical  2018   • Diabetes mellitus (CMS/HCC)    • Hyperlipidemia    • Hypertension    • Malaise and fatigue    • Menopause, premature    • PONV (postoperative nausea and vomiting)    • RLS (restless legs syndrome)    • Swelling, limb          Past Surgical History:  Past Surgical History:   Procedure Laterality Date   •  SECTION     • ENDOSCOPY N/A 3/22/2018    Procedure: ESOPHAGOGASTRODUODENOSCOPY WITH ANESTHESIA;  Surgeon: Don Tovar MD;  Location: [facility];  Service: Gastroenterology   • HYSTERECTOMY  1986   • NECK SURGERY      C6-7 ACDF   • OVARY SURGERY     • CO LAP,CHOLECYSTECTOMY N/A 2016    Procedure: CHOLECYSTECTOMY LAPAROSCOPIC;  Surgeon: Don Tovar MD;  Location: [facility];  Service: General   • WRIST SURGERY           Family History:  Family History   Problem Relation Age of Onset   • Breast cancer Neg Hx          Social History:  Social History     Socioeconomic History   • Marital status:      Spouse name: moiz   • Number of children: 2   • Years of education: 8   • Highest education level: Not on file   Occupational History   • Occupation: retired   Social Needs   • Financial resource strain: Not very hard   • Food insecurity:     Worry: Never true     Inability: Never true   • Transportation needs:     Medical: No     Non-medical: No   Tobacco Use   • Smoking status: Former Smoker     Packs/day: 0.50     Years: 25.00     Pack years: 12.50     Types: Cigarettes   • Smokeless tobacco: Former User     Quit date: 2008   • Tobacco comment: 25-30 YEARS   Substance and Sexual Activity   • Alcohol use: No   • Drug use: No   • Sexual activity: Defer   Lifestyle   • Physical activity:     Days per week: 3 days     Minutes per session: 30 min   • Stress: Very much   Relationships   • Social connections:     Talks on phone: Once a week     Gets together: Never     Attends Worship service: More than 4 times per year     Active member of club or organization: No      Attends meetings of clubs or organizations: Never     Relationship status:      Body mass index is 23.05 kg/m².      Medications:  Current Outpatient Medications   Medication Sig Dispense Refill   • cetirizine (zyrTEC) 10 MG tablet Take 1 tablet by mouth Daily. 90 tablet 3   • FLUoxetine (PROZAC) 20 MG capsule Take 1 capsule by mouth Daily. 30 capsule 5   • furosemide (LASIX) 20 MG tablet Take 1 tablet by mouth Daily As Needed (swelling). 30 tablet 5   • gabapentin (NEURONTIN) 100 MG capsule 1 tablet twice daily 60 capsule 2   • HYDROcodone-acetaminophen (NORCO) 7.5-325 MG per tablet Take 1 tablet by mouth Every 6 (Six) Hours As Needed for Moderate Pain . 60 tablet 0   • ibuprofen (ADVIL,MOTRIN) 800 MG tablet Take 1 tablet by mouth Every 6 (Six) Hours As Needed for Mild Pain . 90 tablet 3   • levothyroxine (SYNTHROID) 50 MCG tablet Take 1 tablet by mouth Daily. 90 tablet 3   • methocarbamol (ROBAXIN) 750 MG tablet Take 1 tablet by mouth 3 (Three) Times a Day As Needed for Muscle Spasms. 90 tablet 5   • montelukast (SINGULAIR) 10 MG tablet Take 1 tablet by mouth Every Night. 90 tablet 3   • pantoprazole (PROTONIX) 40 MG EC tablet Take 1 tablet by mouth Daily. 90 tablet 3   • potassium chloride ER (K-TAB) 20 MEQ tablet controlled-release ER tablet Take 1 tablet by mouth Daily As Needed (when take lasix). 30 tablet 5   • rOPINIRole (REQUIP) 2 MG tablet Take 1 tablet by mouth Every Night. Take 1 hour before bedtime. 90 tablet 3   • rosuvastatin (CRESTOR) 5 MG tablet Take 1 tablet by mouth Every Night. 30 tablet 5   • traMADol ER (ULTRAM-ER) 100 MG 24 hr tablet Take 2 tablets by mouth Daily. 60 tablet 2   • triamcinolone (KENALOG) 0.1 % cream APPLY EXTERNALLY TO THE AFFECTED AREA TWICE DAILY 80 g 0   • verapamil SR (CALAN-SR) 240 MG CR tablet Take 1 tablet by mouth Every Night. 90 tablet 3     No current facility-administered medications for this visit.          Allergies:  Allergies   Allergen Reactions   • Sulfa  "Antibiotics Anaphylaxis   • Latex Rash         Review of Systems:   Review of Systems   Constitutional: Negative.    HENT: Negative.    Eyes: Negative.    Respiratory: Negative.    Cardiovascular: Negative.    Gastrointestinal: Positive for constipation.   Endocrine: Negative.    Genitourinary: Negative.    Musculoskeletal: Positive for arthralgias, back pain and joint swelling.   Skin: Negative.    Allergic/Immunologic: Negative.    Neurological: Negative.    Hematological: Negative.    Psychiatric/Behavioral: Negative.          Physical Exam:   Physical Exam  GENERAL: 74 y.o. female, alert and oriented X 3 in no acute distress.   Visit Vitals  /72   Pulse 77   Ht 157.5 cm (62\")   Wt 57.2 kg (126 lb)   LMP 07/08/1986 (LMP Unknown)   BMI 23.05 kg/m²     Musculoskeletal:   Knee evaluation reveals minimal effusion with moderate medial joint line tenderness no gross instability with varus valgus stressing Lachman or drawer.  Her neurovascular status is intact.      Left knee evaluation shows minimal effusion with mild medial joint line tenderness no lateral joint line tenderness no gross instability with varus valgus stressing Lachman or drawer neurovascular intact.      Radiology/Labs:   Previous radiographs bilateral knees show minimal arthritic changes.  Previous MRI obtained left knee obtained [facility] in 2018 was negative by report.      Assessment & Plan:   74 y.o. female bilateral knee complaints with minimal clinical findings however she has had good response to steroid injections in the past.  On today's exam she does not have obvious effusion and previous MRI negative for meniscal tear.  After discussion we agreed to attempt 1 more steroid treatment to both knees before repeating her MRI or obtaining further work-up today she is provided Depo-Medrol 80 mg intra-articular bilateral knees with lidocaine block.  She will follow-up as needed.      ICD-10-CM ICD-9-CM   1. Pain in both knees, " unspecified chronicity M25.561 719.46    M25.562    Large Joint Arthrocentesis: R knee  Date/Time: 10/23/2019 2:00 PM  Consent given by: patient  Timeout: Immediately prior to procedure a time out was called to verify the correct patient, procedure, equipment, support staff and site/side marked as required   Supporting Documentation  Indications: pain   Procedure Details  Location: knee - R knee  Preparation: Patient was prepped and draped in the usual sterile fashion  Needle size: 25 G  Approach: anterolateral  Medications administered: 2 mL lidocaine 2%; 80 mg methylPREDNISolone acetate 40 MG/ML  Patient tolerance: patient tolerated the procedure well with no immediate complications    Large Joint Arthrocentesis: L knee  Date/Time: 10/23/2019 8:35 PM  Consent given by: patient  Timeout: Immediately prior to procedure a time out was called to verify the correct patient, procedure, equipment, support staff and site/side marked as required   Supporting Documentation  Indications: pain   Procedure Details  Location: knee - L knee  Preparation: Patient was prepped and draped in the usual sterile fashion  Needle size: 25 G  Approach: anterolateral  Medications administered: 2 mL lidocaine 2%; 80 mg methylPREDNISolone acetate 40 MG/ML  Patient tolerance: patient tolerated the procedure well with no immediate complications                Cc:   Miranda Bo APRN                This document has been electronically signed by Omar Saenz MD   October 23, 2019 1:30 PM

## 2019-10-28 ENCOUNTER — OFFICE VISIT (OUTPATIENT)
Dept: FAMILY MEDICINE CLINIC | Facility: CLINIC | Age: 74
End: 2019-10-28

## 2019-10-28 VITALS
OXYGEN SATURATION: 98 % | WEIGHT: 129 LBS | HEART RATE: 74 BPM | TEMPERATURE: 97.7 F | HEIGHT: 62 IN | BODY MASS INDEX: 23.74 KG/M2 | DIASTOLIC BLOOD PRESSURE: 80 MMHG | SYSTOLIC BLOOD PRESSURE: 145 MMHG

## 2019-10-28 DIAGNOSIS — N94.10 DYSPAREUNIA, FEMALE: ICD-10-CM

## 2019-10-28 DIAGNOSIS — Z91.09 MULTIPLE ENVIRONMENTAL ALLERGIES: Primary | ICD-10-CM

## 2019-10-28 DIAGNOSIS — M25.561 CHRONIC PAIN OF RIGHT KNEE: ICD-10-CM

## 2019-10-28 DIAGNOSIS — E11.9 TYPE 2 DIABETES MELLITUS WITHOUT COMPLICATION, WITHOUT LONG-TERM CURRENT USE OF INSULIN (HCC): ICD-10-CM

## 2019-10-28 DIAGNOSIS — I10 ESSENTIAL HYPERTENSION: ICD-10-CM

## 2019-10-28 DIAGNOSIS — M50.30 DDD (DEGENERATIVE DISC DISEASE), CERVICAL: ICD-10-CM

## 2019-10-28 DIAGNOSIS — M25.562 CHRONIC PAIN OF BOTH KNEES: ICD-10-CM

## 2019-10-28 DIAGNOSIS — M25.561 CHRONIC PAIN OF BOTH KNEES: ICD-10-CM

## 2019-10-28 DIAGNOSIS — G89.29 CHRONIC PAIN OF BOTH KNEES: ICD-10-CM

## 2019-10-28 DIAGNOSIS — G89.29 CHRONIC PAIN OF RIGHT KNEE: ICD-10-CM

## 2019-10-28 PROBLEM — Z23 ENCOUNTER FOR VACCINATION: Status: RESOLVED | Noted: 2018-05-02 | Resolved: 2019-10-28

## 2019-10-28 PROCEDURE — 99214 OFFICE O/P EST MOD 30 MIN: CPT | Performed by: NURSE PRACTITIONER

## 2019-10-28 RX ORDER — LISINOPRIL 2.5 MG/1
2.5 TABLET ORAL DAILY
Qty: 90 TABLET | Refills: 3 | Status: SHIPPED | OUTPATIENT
Start: 2019-10-28 | End: 2020-01-28

## 2019-10-28 NOTE — ASSESSMENT & PLAN NOTE
Mildly elevated today.  Add lisinopril 2.5 mg daily.  Discussed low sodium heart healthy diet.  Monitor randomly and report any rating greater than 140/80.

## 2019-10-28 NOTE — PROGRESS NOTES
Cathryn Cabello is a 74 y.o. female.     Chief Complaint   Patient presents with   • Hypertension     Personal problem that I would like to discuss with Miranda      History of Present Illness:      Patient reports that she continues to have vaginal irritation and stinging after having sex with her .  This is been going on for several months.  She was seen by an allergist in the past that did not test her for this allergy.  Patient is very embarrassed about having this discussion.  She reports this is being a very sensitive subject for her and her .  She has been  to her has been for over 50 years.  She has had pain with intercourse for many years now though they have just recently started discussing the problem.  Patient feels that she is likely allergic to her 's sperm as when he wears a condom she does not have the pain and burning.  She would like to be referred to a different allergist who can discuss treatment/testing to see if she is allergic to her 's sperm.    Chronic pain-patient is scheduled to see pain management later this month.    Hypertension-blood pressures 145/80 today.  She is not taking an ACE inhibitor at this time.  Currently she receives verapamil 240 mg CR nightly.  Patient reports that she is anxious about discussing personal problems today.  She is also having some pain which may contribute to her mildly elevated blood pressure today.    Type 2 diabetes- patient is trying to control this with diet.  She is not currently taking metformin.  Patient tries to be physical and eats very little fried or processed foods.  She denies any symptomatic hypoglycemia or hyperglycemia.      The following portions of the patient's history and ROS were reviewed and updated as appropriate per provider:  Allergies, current medications, past family history, past medical history, past social history, past surgical history and problem list.    Review of Systems  "  Constitutional: Positive for activity change and fatigue. Negative for appetite change, chills, fever and unexpected weight change.   HENT: Negative.    Eyes: Positive for visual disturbance (wears glasses, no recent changes ). Negative for redness and itching.   Respiratory: Negative for chest tightness, shortness of breath and wheezing.    Cardiovascular: Positive for leg swelling (feet at times ). Negative for chest pain and palpitations.   Gastrointestinal: Positive for constipation (at times ). Negative for abdominal pain, blood in stool, diarrhea, nausea, rectal pain and vomiting.   Endocrine: Negative.    Genitourinary: Positive for dyspareunia and vaginal pain.   Musculoskeletal: Positive for arthralgias, back pain, gait problem, joint swelling, myalgias and neck pain. Negative for neck stiffness.   Skin: Negative.    Allergic/Immunologic: Positive for environmental allergies and food allergies. Negative for immunocompromised state.   Neurological: Negative for seizures, facial asymmetry, speech difficulty and light-headedness.   Psychiatric/Behavioral: Positive for sleep disturbance. Negative for agitation, behavioral problems, decreased concentration, dysphoric mood, self-injury and suicidal ideas.       Objective     /80   Pulse 74   Temp 97.7 °F (36.5 °C) (Temporal)   Ht 157.5 cm (62\")   Wt 58.5 kg (129 lb)   LMP 07/08/1986 (LMP Unknown)   SpO2 98%   BMI 23.59 kg/m²   Lab on 06/17/2019   Component Date Value Ref Range Status   • Glucose 06/17/2019 106* 65 - 99 mg/dL Final   • BUN 06/17/2019 15  8 - 23 mg/dL Final   • Creatinine 06/17/2019 0.86  0.57 - 1.00 mg/dL Final   • Sodium 06/17/2019 143  136 - 145 mmol/L Final   • Potassium 06/17/2019 4.4  3.5 - 5.2 mmol/L Final   • Chloride 06/17/2019 104  98 - 107 mmol/L Final   • CO2 06/17/2019 24.9  22.0 - 29.0 mmol/L Final   • Calcium 06/17/2019 9.8  8.6 - 10.5 mg/dL Final   • Total Protein 06/17/2019 6.9  6.0 - 8.5 g/dL Final   • Albumin " 06/17/2019 4.60  3.50 - 5.20 g/dL Final   • ALT (SGPT) 06/17/2019 35* 1 - 33 U/L Final   • AST (SGOT) 06/17/2019 29  1 - 32 U/L Final   • Alkaline Phosphatase 06/17/2019 62  39 - 117 U/L Final   • Total Bilirubin 06/17/2019 0.4  0.2 - 1.2 mg/dL Final   • eGFR Non  Amer 06/17/2019 65  >60 mL/min/1.73 Final   • eGFR   Amer 06/17/2019 78  >60 mL/min/1.73 Final   • Globulin 06/17/2019 2.3  gm/dL Final   • A/G Ratio 06/17/2019 2.0  g/dL Final   • BUN/Creatinine Ratio 06/17/2019 17.4  7.0 - 25.0 Final   • Anion Gap 06/17/2019 14.1  mmol/L Final   • TSH 06/17/2019 2.880  0.270 - 4.200 mIU/mL Final   • Hemoglobin A1C 06/17/2019 6.22* 4.80 - 5.60 % Final   • 25 Hydroxy, Vitamin D 06/17/2019 37.7  30.0 - 100.0 ng/ml Final   • Vitamin B-12 06/17/2019 858  211 - 946 pg/mL Final   • Total Cholesterol 06/17/2019 244* 0 - 200 mg/dL Final   • Triglycerides 06/17/2019 269* 0 - 150 mg/dL Final   • HDL Cholesterol 06/17/2019 41  40 - 60 mg/dL Final   • LDL Cholesterol  06/17/2019 149* 0 - 100 mg/dL Final   • VLDL Cholesterol 06/17/2019 53.8* 5 - 40 mg/dL Final   • LDL/HDL Ratio 06/17/2019 3.64   Final   • WBC 06/17/2019 5.94  3.40 - 10.80 10*3/mm3 Final   • RBC 06/17/2019 4.48  3.77 - 5.28 10*6/mm3 Final   • Hemoglobin 06/17/2019 13.5  12.0 - 15.9 g/dL Final   • Hematocrit 06/17/2019 42.8  34.0 - 46.6 % Final   • MCV 06/17/2019 95.5  79.0 - 97.0 fL Final   • MCH 06/17/2019 30.1  26.6 - 33.0 pg Final   • MCHC 06/17/2019 31.5  31.5 - 35.7 g/dL Final   • RDW 06/17/2019 13.2  12.3 - 15.4 % Final   • RDW-SD 06/17/2019 46.3  37.0 - 54.0 fl Final   • MPV 06/17/2019 11.2  6.0 - 12.0 fL Final   • Platelets 06/17/2019 310  140 - 450 10*3/mm3 Final   • Neutrophil % 06/17/2019 51.1  42.7 - 76.0 % Final   • Lymphocyte % 06/17/2019 39.6  19.6 - 45.3 % Final   • Monocyte % 06/17/2019 5.6  5.0 - 12.0 % Final   • Eosinophil % 06/17/2019 2.5  0.3 - 6.2 % Final   • Basophil % 06/17/2019 1.0  0.0 - 1.5 % Final   • Immature Grans %  06/17/2019 0.2  0.0 - 0.5 % Final   • Neutrophils, Absolute 06/17/2019 3.04  1.70 - 7.00 10*3/mm3 Final   • Lymphocytes, Absolute 06/17/2019 2.35  0.70 - 3.10 10*3/mm3 Final   • Monocytes, Absolute 06/17/2019 0.33  0.10 - 0.90 10*3/mm3 Final   • Eosinophils, Absolute 06/17/2019 0.15  0.00 - 0.40 10*3/mm3 Final   • Basophils, Absolute 06/17/2019 0.06  0.00 - 0.20 10*3/mm3 Final   • Immature Grans, Absolute 06/17/2019 0.01  0.00 - 0.05 10*3/mm3 Final   • nRBC 06/17/2019 0.0  0.0 - 0.2 /100 WBC Final       Physical Exam   Constitutional: She is oriented to person, place, and time. Vital signs are normal. She appears well-developed and well-nourished. No distress.   Very polite and well mannered 74-year-old female.   HENT:   Head: Normocephalic.   Right Ear: External ear normal.   Left Ear: External ear normal.   Nose: Nose normal.   Mouth/Throat: Oropharynx is clear and moist. No oropharyngeal exudate.   Eyes: Conjunctivae, EOM and lids are normal. Pupils are equal, round, and reactive to light. Right eye exhibits no discharge. Left eye exhibits no discharge.   Neck: Normal range of motion. Neck supple. No tracheal deviation present. No thyromegaly present.   Cardiovascular: Normal rate, regular rhythm and normal heart sounds. Exam reveals no gallop and no friction rub.   No murmur heard.  Pulmonary/Chest: Effort normal and breath sounds normal. No respiratory distress. She has no wheezes. She has no rales. She exhibits no tenderness.   Abdominal: Soft. Normal appearance and bowel sounds are normal. She exhibits no distension and no mass. There is no tenderness. There is no rebound and no guarding.   Musculoskeletal:        Right knee: She exhibits decreased range of motion. Tenderness found. Medial joint line and lateral joint line tenderness noted.        Left knee: She exhibits decreased range of motion. Tenderness found. Medial joint line and lateral joint line tenderness noted.        Cervical back: She exhibits  decreased range of motion, tenderness and spasm.        Lumbar back: She exhibits decreased range of motion and tenderness.   Lymphadenopathy:     She has no cervical adenopathy.   Neurological: She is alert and oriented to person, place, and time. She has normal reflexes.   CN 2-12 grossly intact    Skin: Skin is warm and dry. Capillary refill takes less than 2 seconds. No rash noted. She is not diaphoretic. No erythema.   Psychiatric: She has a normal mood and affect. Her speech is normal and behavior is normal. Judgment and thought content normal. Cognition and memory are normal.   Vitals reviewed.      Assessment/Plan     Problem List Items Addressed This Visit        Cardiovascular and Mediastinum    Essential hypertension    Current Assessment & Plan     Mildly elevated today.  Add lisinopril 2.5 mg daily.  Discussed low sodium heart healthy diet.  Monitor randomly and report any rating greater than 140/80.         Relevant Medications    lisinopril (ZESTRIL) 2.5 MG tablet    Other Relevant Orders    Microalbumin / Creatinine Urine Ratio - Urine, Clean Catch       Endocrine    Type 2 diabetes mellitus without complication (CMS/[facility])    Current Assessment & Plan     Diabetes is improving with lifestyle modifications.   Continue current treatment regimen.  Diabetes will be reassessed in 3 months.            Nervous and Auditory    Dyspareunia, female    Current Assessment & Plan     Recommend lambskin condoms.  Refer to allergy/allergist for allergy testing.  I have requested that only one staff member be aware of these personal issues.  Our referrals  will call the allergist that patient has requested to see and ask if they have a way of testing her 's sperm to see if that could be her possible allergen.  Social support was provided.            Musculoskeletal and Integument    Chronic pain of right knee    Current Assessment & Plan      current medication while waiting on pain management referral          Chronic pain of both knees    Overview     Osteoarthritis  I have discussed the procedure benefits versus risks including those of bleeding, infection, injury and allergic reaction.  We have also discussed alternative treatment options.  Patient questions were sought and answered.  Patient states understanding and voices consent for procedure today.         DDD (degenerative disc disease), cervical    Overview     Imaging on file         Current Assessment & Plan     Patient has been compliant with medication, Diaz and urine drug screen.  Ms. Cabello is well-known to me as a patient as well is in our community.  I have referred her to pain management to provide her with better quality of life and improved pain management.  She has been compliant.           Other Visit Diagnoses     Multiple environmental allergies    -  Primary    Relevant Orders    Ambulatory Referral to Allergy (Completed)                   Patient's Body mass index is 23.59 kg/m². BMI is within normal parameters. No follow-up required..          I have discussed diagnosis in detail today allowing time for questions and answers. Patient is aware of reasons to seek urgent or emergent medical care as well as reasons to return to the clinic for evaluation. Possible side effects, interactions and progression of symptoms discussed as well. Patient / family states understanding.   Emotional support and active listening provided.       Follow up in 3 months. Routine labs fasting one week prior to next office visit. Return sooner if needed.     Dictated utilizing Dragon dictation. Errors in dictation may reflect use of voice recognition software and not all errors in transcription may have been detected prior to signing.           This document has been electronically signed by:  ZHOU Moncada, NP-C

## 2019-10-28 NOTE — ASSESSMENT & PLAN NOTE
Recommend lambskin condoms.  Refer to allergy/allergist for allergy testing.  I have requested that only one staff member be aware of these personal issues.  Our referrals  will call the allergist that patient has requested to see and ask if they have a way of testing her 's sperm to see if that could be her possible allergen.  Social support was provided.

## 2019-11-26 ENCOUNTER — PREP FOR SURGERY (OUTPATIENT)
Dept: OTHER | Facility: HOSPITAL | Age: 74
End: 2019-11-26

## 2019-11-26 DIAGNOSIS — M54.16 LUMBAR RADICULOPATHY: Primary | ICD-10-CM

## 2019-11-26 NOTE — H&P
[id number]  Tyler Cabello  female  1945  Tha Rutledge DO  2019  PATIENT NAME: Tyler Cabello           Past Medical History:   Diagnosis Date   • Acquired hypothyroidism 2018   • Allergic rhinitis    • Asthma     last asthma attack 2-3 month ago, SEASONAL ALLERGIES   • Chest pain     RELATED TO GALLBLADDER PER PT   • Constipation    • Cough    • DDD (degenerative disc disease), cervical 2018   • Diabetes mellitus (CMS/HCC)    • Hyperlipidemia    • Hypertension    • Malaise and fatigue    • Menopause, premature    • PONV (postoperative nausea and vomiting)    • RLS (restless legs syndrome)    • Swelling, limb        Past Surgical History:   Procedure Laterality Date   •  SECTION     • ENDOSCOPY N/A 3/22/2018    Procedure: ESOPHAGOGASTRODUODENOSCOPY WITH ANESTHESIA;  Surgeon: Don Tovar MD;  Location: [facility];  Service: Gastroenterology   • HYSTERECTOMY     • NECK SURGERY      C6-7 ACDF   • OVARY SURGERY     • [place] LAP,CHOLECYSTECTOMY N/A 2016    Procedure: CHOLECYSTECTOMY LAPAROSCOPIC;  Surgeon: Don Tovar MD;  Location: [facility];  Service: General   • WRIST SURGERY         Social History     Socioeconomic History   • Marital status:      Spouse name: moiz   • Number of children: 2   • Years of education: 8   • Highest education level: Not on file   Occupational History   • Occupation: retired   Social Needs   • Financial resource strain: Not very hard   • Food insecurity:     Worry: Never true     Inability: Never true   • Transportation needs:     Medical: No     Non-medical: No   Tobacco Use   • Smoking status: Former Smoker     Packs/day: 0.50     Years: 25.00     Pack years: 12.50     Types: Cigarettes   • Smokeless tobacco: Former User     Quit date: 2008   • Tobacco comment: 25-30 YEARS   Substance and Sexual Activity   • Alcohol use: No   • Drug use: No   • Sexual activity: Defer   Lifestyle   • Physical activity:     Days per week: 3  days     Minutes per session: 30 min   • Stress: Very much   Relationships   • Social connections:     Talks on phone: Once a week     Gets together: Never     Attends Zoroastrian service: More than 4 times per year     Active member of club or organization: No     Attends meetings of clubs or organizations: Never     Relationship status:        Family History   Problem Relation Age of Onset   • Breast cancer Neg Hx          (Not in a hospital admission)      Review of Systems/Physical Exam:   Within Normal Limits Abnormal   Mental Status [x]    []     Head, Neck, and Throat [x]   []     Chest/Lungs [x]   []     Cardiovascular [x]   []     Abdomen [x]   []     Extremities [x]   []     Neurologic [x]   []     Other         There were no vitals filed for this visit.    Review of Systems - as previously noted      Diagnosis: M54.16    Plan: LUMBAR EPIDURAL 1ST VISIT L4/5      STATEMENT AS TO REASON OF PLANNED OPERATION:  [x]   H&P revealed no contraindication to planned surgery. (Check if correct).    [x]   Labs (if ordered) have been reviewed and revealed no contraindications to planned surgery. (Check if correct).      Tha Rutledge,   11/26/2019

## 2019-11-27 ENCOUNTER — APPOINTMENT (OUTPATIENT)
Dept: GENERAL RADIOLOGY | Facility: HOSPITAL | Age: 74
End: 2019-11-27

## 2019-11-27 ENCOUNTER — HOSPITAL ENCOUNTER (OUTPATIENT)
Facility: HOSPITAL | Age: 74
Setting detail: HOSPITAL OUTPATIENT SURGERY
Discharge: HOME | End: 2019-11-27
Attending: ANESTHESIOLOGY | Admitting: ANESTHESIOLOGY

## 2019-11-27 VITALS
TEMPERATURE: 97.6 F | BODY MASS INDEX: 23.74 KG/M2 | DIASTOLIC BLOOD PRESSURE: 78 MMHG | SYSTOLIC BLOOD PRESSURE: 137 MMHG | WEIGHT: 129 LBS | HEIGHT: 62 IN | OXYGEN SATURATION: 99 % | HEART RATE: 72 BPM | RESPIRATION RATE: 18 BRPM

## 2019-11-27 PROCEDURE — 25010000002 METHYLPREDNISOLONE PER 80 MG: Performed by: ANESTHESIOLOGY

## 2019-11-27 PROCEDURE — 76000 FLUOROSCOPY <1 HR PHYS/QHP: CPT

## 2019-11-27 PROCEDURE — 76000 FLUOROSCOPY <1 HR PHYS/QHP: CPT | Performed by: RADIOLOGY

## 2019-11-27 PROCEDURE — 0 IOPAMIDOL 41 % SOLUTION: Performed by: ANESTHESIOLOGY

## 2019-11-27 RX ORDER — LIDOCAINE HYDROCHLORIDE 20 MG/ML
INJECTION, SOLUTION INFILTRATION; PERINEURAL AS NEEDED
Status: DISCONTINUED | OUTPATIENT
Start: 2019-11-27 | End: 2019-11-27 | Stop reason: HOSPADM

## 2019-11-27 RX ORDER — METHYLPREDNISOLONE ACETATE 80 MG/ML
INJECTION, SUSPENSION INTRA-ARTICULAR; INTRALESIONAL; INTRAMUSCULAR; SOFT TISSUE AS NEEDED
Status: DISCONTINUED | OUTPATIENT
Start: 2019-11-27 | End: 2019-11-27 | Stop reason: HOSPADM

## 2019-11-27 RX ORDER — SODIUM CHLORIDE 9 MG/ML
INJECTION, SOLUTION INTRAVENOUS AS NEEDED
Status: DISCONTINUED | OUTPATIENT
Start: 2019-11-27 | End: 2019-11-27 | Stop reason: HOSPADM

## 2019-11-27 RX ORDER — BACLOFEN 10 MG/1
10 TABLET ORAL 3 TIMES DAILY PRN
COMMUNITY
End: 2020-01-28 | Stop reason: SDUPTHER

## 2019-12-06 DIAGNOSIS — G25.81 RESTLESS LEG SYNDROME: ICD-10-CM

## 2019-12-10 RX ORDER — ROPINIROLE 2 MG/1
2 TABLET, FILM COATED ORAL NIGHTLY
Qty: 90 TABLET | Refills: 3 | Status: SHIPPED | OUTPATIENT
Start: 2019-12-10 | End: 2020-01-28 | Stop reason: SDUPTHER

## 2020-01-10 ENCOUNTER — PREP FOR SURGERY (OUTPATIENT)
Dept: OTHER | Facility: HOSPITAL | Age: 75
End: 2020-01-10

## 2020-01-10 DIAGNOSIS — M54.12 CERVICAL RADICULITIS: Primary | ICD-10-CM

## 2020-01-10 NOTE — H&P (VIEW-ONLY)
[id number]  Tyler Cabello  female  1945  Tha Rutledge DO  1/10/2020  PATIENT NAME: Tyler Cabello      BP: ()/()   Arterial Line BP: ()/()     Past Medical History:   Diagnosis Date   • Acquired hypothyroidism 2018   • Allergic rhinitis    • Asthma     last asthma attack 2-3 month ago, SEASONAL ALLERGIES   • Chest pain     RELATED TO GALLBLADDER PER PT   • Constipation    • Cough    • DDD (degenerative disc disease), cervical 2018   • Diabetes mellitus (CMS/HCC)    • Elevated cholesterol    • GERD (gastroesophageal reflux disease)    • Hyperlipidemia    • Hypertension    • Malaise and fatigue    • Menopause, premature    • PONV (postoperative nausea and vomiting)    • RLS (restless legs syndrome)    • Swelling, limb        Past Surgical History:   Procedure Laterality Date   •  SECTION     • COLONOSCOPY     • ENDOSCOPY N/A 3/22/2018    Procedure: ESOPHAGOGASTRODUODENOSCOPY WITH ANESTHESIA;  Surgeon: Don Tovar MD;  Location: [facility];  Service: Gastroenterology   • HYSTERECTOMY     • LUMBAR EPIDURAL INJECTION N/A 2019    Procedure: LUMBAR EPIDURAL 1ST VISIT L4/5;  Surgeon: Tha Rutledge DO;  Location: [facility];  Service: Pain Management   • NECK SURGERY      C6-7 ACDF   • OVARY SURGERY     • [place] LAP,CHOLECYSTECTOMY N/A 2016    Procedure: CHOLECYSTECTOMY LAPAROSCOPIC;  Surgeon: Don Tovar MD;  Location: [facility];  Service: General   • WRIST SURGERY         Social History     Socioeconomic History   • Marital status:      Spouse name: moiz   • Number of children: 2   • Years of education: 8   • Highest education level: Not on file   Occupational History   • Occupation: retired   Social Needs   • Financial resource strain: Not very hard   • Food insecurity:     Worry: Never true     Inability: Never true   • Transportation needs:     Medical: No     Non-medical: No   Tobacco Use   • Smoking status: Former Smoker     Packs/day: 0.50     Years:  25.00     Pack years: 12.50     Types: Cigarettes   • Smokeless tobacco: Former User     Quit date: 7/6/2008   • Tobacco comment: 25-30 YEARS   Substance and Sexual Activity   • Alcohol use: No   • Drug use: No   • Sexual activity: Defer   Lifestyle   • Physical activity:     Days per week: 3 days     Minutes per session: 30 min   • Stress: Very much   Relationships   • Social connections:     Talks on phone: Once a week     Gets together: Never     Attends Confucianist service: More than 4 times per year     Active member of club or organization: No     Attends meetings of clubs or organizations: Never     Relationship status:        Family History   Problem Relation Age of Onset   • Breast cancer Neg Hx          (Not in a hospital admission)      Review of Systems/Physical Exam:   Within Normal Limits Abnormal   Mental Status [x]    []     Head, Neck, and Throat [x]   []     Chest/Lungs [x]   []     Cardiovascular [x]   []     Abdomen [x]   []     Extremities [x]   []     Neurologic [x]   []     Other         There were no vitals filed for this visit.    Review of Systems - as previously noted      Diagnosis: M54.12    Plan: CERVICAL EPIDURAL C7/T1 (N/A)       STATEMENT AS TO REASON OF PLANNED OPERATION:  [x]   H&P revealed no contraindication to planned surgery. (Check if correct).    [x]   Labs (if ordered) have been reviewed and revealed no contraindications to planned surgery. (Check if correct).      Tha Rutledge, DO  1/10/2020

## 2020-01-10 NOTE — H&P
[id number]  Tyler Cabello  female  1945  Tha Rutledge DO  1/10/2020  PATIENT NAME: Tyler Cabello      BP: ()/()   Arterial Line BP: ()/()     Past Medical History:   Diagnosis Date   • Acquired hypothyroidism 2018   • Allergic rhinitis    • Asthma     last asthma attack 2-3 month ago, SEASONAL ALLERGIES   • Chest pain     RELATED TO GALLBLADDER PER PT   • Constipation    • Cough    • DDD (degenerative disc disease), cervical 2018   • Diabetes mellitus (CMS/HCC)    • Elevated cholesterol    • GERD (gastroesophageal reflux disease)    • Hyperlipidemia    • Hypertension    • Malaise and fatigue    • Menopause, premature    • PONV (postoperative nausea and vomiting)    • RLS (restless legs syndrome)    • Swelling, limb        Past Surgical History:   Procedure Laterality Date   •  SECTION     • COLONOSCOPY     • ENDOSCOPY N/A 3/22/2018    Procedure: ESOPHAGOGASTRODUODENOSCOPY WITH ANESTHESIA;  Surgeon: Don Tovar MD;  Location: [facility];  Service: Gastroenterology   • HYSTERECTOMY     • LUMBAR EPIDURAL INJECTION N/A 2019    Procedure: LUMBAR EPIDURAL 1ST VISIT L4/5;  Surgeon: Tha Rutledge DO;  Location: [facility];  Service: Pain Management   • NECK SURGERY      C6-7 ACDF   • OVARY SURGERY     • [place] LAP,CHOLECYSTECTOMY N/A 2016    Procedure: CHOLECYSTECTOMY LAPAROSCOPIC;  Surgeon: Don Tovar MD;  Location: [facility];  Service: General   • WRIST SURGERY         Social History     Socioeconomic History   • Marital status:      Spouse name: moiz   • Number of children: 2   • Years of education: 8   • Highest education level: Not on file   Occupational History   • Occupation: retired   Social Needs   • Financial resource strain: Not very hard   • Food insecurity:     Worry: Never true     Inability: Never true   • Transportation needs:     Medical: No     Non-medical: No   Tobacco Use   • Smoking status: Former Smoker     Packs/day: 0.50     Years:  25.00     Pack years: 12.50     Types: Cigarettes   • Smokeless tobacco: Former User     Quit date: 7/6/2008   • Tobacco comment: 25-30 YEARS   Substance and Sexual Activity   • Alcohol use: No   • Drug use: No   • Sexual activity: Defer   Lifestyle   • Physical activity:     Days per week: 3 days     Minutes per session: 30 min   • Stress: Very much   Relationships   • Social connections:     Talks on phone: Once a week     Gets together: Never     Attends Druze service: More than 4 times per year     Active member of club or organization: No     Attends meetings of clubs or organizations: Never     Relationship status:        Family History   Problem Relation Age of Onset   • Breast cancer Neg Hx          (Not in a hospital admission)      Review of Systems/Physical Exam:   Within Normal Limits Abnormal   Mental Status [x]    []     Head, Neck, and Throat [x]   []     Chest/Lungs [x]   []     Cardiovascular [x]   []     Abdomen [x]   []     Extremities [x]   []     Neurologic [x]   []     Other         There were no vitals filed for this visit.    Review of Systems - as previously noted      Diagnosis: M54.12    Plan: CERVICAL EPIDURAL C7/T1 (N/A)       STATEMENT AS TO REASON OF PLANNED OPERATION:  [x]   H&P revealed no contraindication to planned surgery. (Check if correct).    [x]   Labs (if ordered) have been reviewed and revealed no contraindications to planned surgery. (Check if correct).      Tha Rutledge, DO  1/10/2020

## 2020-01-15 ENCOUNTER — HOSPITAL ENCOUNTER (OUTPATIENT)
Facility: HOSPITAL | Age: 75
Setting detail: HOSPITAL OUTPATIENT SURGERY
Discharge: HOME | End: 2020-01-15
Attending: ANESTHESIOLOGY | Admitting: ANESTHESIOLOGY

## 2020-01-15 ENCOUNTER — APPOINTMENT (OUTPATIENT)
Dept: GENERAL RADIOLOGY | Facility: HOSPITAL | Age: 75
End: 2020-01-15

## 2020-01-15 VITALS
HEART RATE: 77 BPM | RESPIRATION RATE: 20 BRPM | TEMPERATURE: 98.1 F | DIASTOLIC BLOOD PRESSURE: 82 MMHG | SYSTOLIC BLOOD PRESSURE: 157 MMHG | OXYGEN SATURATION: 98 % | HEIGHT: 61 IN | WEIGHT: 136 LBS | BODY MASS INDEX: 25.68 KG/M2

## 2020-01-15 PROCEDURE — 25010000002 DEXAMETHASONE PER 1 MG: Performed by: ANESTHESIOLOGY

## 2020-01-15 PROCEDURE — 0 IOPAMIDOL 41 % SOLUTION: Performed by: ANESTHESIOLOGY

## 2020-01-15 PROCEDURE — 76000 FLUOROSCOPY <1 HR PHYS/QHP: CPT | Performed by: RADIOLOGY

## 2020-01-15 PROCEDURE — 76000 FLUOROSCOPY <1 HR PHYS/QHP: CPT

## 2020-01-15 RX ORDER — LIDOCAINE HYDROCHLORIDE 20 MG/ML
INJECTION, SOLUTION INFILTRATION; PERINEURAL AS NEEDED
Status: DISCONTINUED | OUTPATIENT
Start: 2020-01-15 | End: 2020-01-15 | Stop reason: HOSPADM

## 2020-01-15 RX ORDER — DEXAMETHASONE SODIUM PHOSPHATE 10 MG/ML
INJECTION INTRAMUSCULAR; INTRAVENOUS AS NEEDED
Status: DISCONTINUED | OUTPATIENT
Start: 2020-01-15 | End: 2020-01-15 | Stop reason: HOSPADM

## 2020-01-15 RX ORDER — 0.9 % SODIUM CHLORIDE 0.9 %
VIAL (ML) INJECTION AS NEEDED
Status: DISCONTINUED | OUTPATIENT
Start: 2020-01-15 | End: 2020-01-15 | Stop reason: HOSPADM

## 2020-01-15 NOTE — OP NOTE
Informed consent was obtained after a discussion of risks(hemodynamic instability, respiratory compromise, nerve damage, etc.) and benefits of the procedure, as well as the treatment alternatives, including continued conservative therapy.    The patient was brought into the fluoroscopy suite and positioned in the prone position on the procedure table with the head supported by a pillow.  Blood pressure, heart rate, and pulse oximetry were monitored throughout the case. Skin overlying the cervical region was cleansed with both ethyl alcohol and chlorahexidine solution. The area was then draped with sterile towel. Skin overlying the area was then infiltrated with 3cc of 1% Lidocaine.  Following this a 18 gauge Touhy needle was advanced into the C7/T1 intralaminar space.  The epidural space was identified using a loss of resistance technique. Oblique view was also done to visualize needle depth. Aspirations were negative for blood or CSF, and no parasthesia were elicited.    Contrast was then injected with appropriate epidural spread.  Total volume of 2 mL (Omnipaque 240 mg/mL).   Next, an epidural steroid injection was performed with 15 mg of dexamethasone (10mg/mL) diluted in 2mL of normal saline for a total volume of 3.5mL. The needle was removed.    The patient was monitored for any adverse hemodynamic, allergic, or neurological symptoms. The patient tolerated the procedure well with no apparent complications. Vital signs remained stable throughout the procedure. The patient was taken to the recovery area where written discharge instructions for the procedure were given. The patient was discharged home.    NOTE: Universal Sterile Protocol was observed throughout the entire procedure.    Cpt 89576    Tha Rutledge DO  01/15/20

## 2020-01-28 ENCOUNTER — OFFICE VISIT (OUTPATIENT)
Dept: FAMILY MEDICINE CLINIC | Facility: CLINIC | Age: 75
End: 2020-01-28

## 2020-01-28 VITALS
RESPIRATION RATE: 18 BRPM | HEART RATE: 87 BPM | TEMPERATURE: 98.5 F | HEIGHT: 62 IN | WEIGHT: 137.2 LBS | DIASTOLIC BLOOD PRESSURE: 80 MMHG | SYSTOLIC BLOOD PRESSURE: 130 MMHG | OXYGEN SATURATION: 97 % | BODY MASS INDEX: 25.25 KG/M2

## 2020-01-28 DIAGNOSIS — R60.0 LOCALIZED EDEMA: ICD-10-CM

## 2020-01-28 DIAGNOSIS — M25.561 CHRONIC PAIN OF RIGHT KNEE: ICD-10-CM

## 2020-01-28 DIAGNOSIS — E03.9 ACQUIRED HYPOTHYROIDISM: ICD-10-CM

## 2020-01-28 DIAGNOSIS — J30.2 OTHER SEASONAL ALLERGIC RHINITIS: ICD-10-CM

## 2020-01-28 DIAGNOSIS — G25.81 RESTLESS LEG SYNDROME: ICD-10-CM

## 2020-01-28 DIAGNOSIS — Z23 ENCOUNTER FOR IMMUNIZATION: ICD-10-CM

## 2020-01-28 DIAGNOSIS — M50.30 DDD (DEGENERATIVE DISC DISEASE), CERVICAL: ICD-10-CM

## 2020-01-28 DIAGNOSIS — K21.00 GASTROESOPHAGEAL REFLUX DISEASE WITH ESOPHAGITIS: ICD-10-CM

## 2020-01-28 DIAGNOSIS — E11.9 TYPE 2 DIABETES MELLITUS WITHOUT COMPLICATION, WITHOUT LONG-TERM CURRENT USE OF INSULIN (HCC): Primary | ICD-10-CM

## 2020-01-28 DIAGNOSIS — G89.29 CHRONIC PAIN OF RIGHT KNEE: ICD-10-CM

## 2020-01-28 DIAGNOSIS — F32.9 REACTIVE DEPRESSION: ICD-10-CM

## 2020-01-28 PROCEDURE — 90732 PPSV23 VACC 2 YRS+ SUBQ/IM: CPT | Performed by: NURSE PRACTITIONER

## 2020-01-28 PROCEDURE — G0009 ADMIN PNEUMOCOCCAL VACCINE: HCPCS | Performed by: NURSE PRACTITIONER

## 2020-01-28 PROCEDURE — 99214 OFFICE O/P EST MOD 30 MIN: CPT | Performed by: NURSE PRACTITIONER

## 2020-01-28 RX ORDER — IBUPROFEN 800 MG/1
800 TABLET ORAL EVERY 6 HOURS PRN
Qty: 90 TABLET | Refills: 3 | Status: SHIPPED | OUTPATIENT
Start: 2020-01-28 | End: 2020-01-28

## 2020-01-28 RX ORDER — VERAPAMIL HYDROCHLORIDE 240 MG/1
240 TABLET, FILM COATED, EXTENDED RELEASE ORAL NIGHTLY
Qty: 90 TABLET | Refills: 3 | Status: SHIPPED | OUTPATIENT
Start: 2020-01-28 | End: 2020-06-02 | Stop reason: SDUPTHER

## 2020-01-28 RX ORDER — MONTELUKAST SODIUM 10 MG/1
10 TABLET ORAL NIGHTLY
Qty: 90 TABLET | Refills: 3 | Status: SHIPPED | OUTPATIENT
Start: 2020-01-28 | End: 2020-06-02 | Stop reason: SDUPTHER

## 2020-01-28 RX ORDER — CROMOLYN SODIUM 100 MG/5ML
SOLUTION, CONCENTRATE ORAL
COMMUNITY
Start: 2019-12-26 | End: 2020-06-02

## 2020-01-28 RX ORDER — FUROSEMIDE 20 MG/1
20 TABLET ORAL DAILY PRN
Qty: 30 TABLET | Refills: 5 | Status: SHIPPED | OUTPATIENT
Start: 2020-01-28 | End: 2020-06-02 | Stop reason: SDUPTHER

## 2020-01-28 RX ORDER — BACLOFEN 10 MG/1
10 TABLET ORAL 3 TIMES DAILY PRN
Qty: 90 TABLET | Refills: 3 | Status: SHIPPED | OUTPATIENT
Start: 2020-01-28 | End: 2021-03-02 | Stop reason: SDUPTHER

## 2020-01-28 RX ORDER — POTASSIUM CHLORIDE 1500 MG/1
20 TABLET, FILM COATED, EXTENDED RELEASE ORAL DAILY PRN
Qty: 30 TABLET | Refills: 5 | Status: SHIPPED | OUTPATIENT
Start: 2020-01-28 | End: 2020-06-02 | Stop reason: SDUPTHER

## 2020-01-28 RX ORDER — PANTOPRAZOLE SODIUM 40 MG/1
40 TABLET, DELAYED RELEASE ORAL DAILY
Qty: 90 TABLET | Refills: 3 | Status: SHIPPED | OUTPATIENT
Start: 2020-01-28 | End: 2020-06-02 | Stop reason: SDUPTHER

## 2020-01-28 RX ORDER — FLUOXETINE HYDROCHLORIDE 20 MG/1
20 CAPSULE ORAL DAILY
Qty: 30 CAPSULE | Refills: 5 | Status: SHIPPED | OUTPATIENT
Start: 2020-01-28 | End: 2020-06-02 | Stop reason: SDUPTHER

## 2020-01-28 RX ORDER — CETIRIZINE HYDROCHLORIDE 10 MG/1
10 TABLET ORAL 2 TIMES DAILY
Qty: 180 TABLET | Refills: 3 | Status: SHIPPED | OUTPATIENT
Start: 2020-01-28 | End: 2021-03-02 | Stop reason: SDUPTHER

## 2020-01-28 RX ORDER — MONTELUKAST SODIUM 10 MG/1
10 TABLET ORAL NIGHTLY
Qty: 90 TABLET | Refills: 3 | Status: SHIPPED | OUTPATIENT
Start: 2020-01-28 | End: 2020-01-28 | Stop reason: SDUPTHER

## 2020-01-28 RX ORDER — ROPINIROLE 2 MG/1
2 TABLET, FILM COATED ORAL NIGHTLY
Qty: 90 TABLET | Refills: 3 | Status: SHIPPED | OUTPATIENT
Start: 2020-01-28 | End: 2020-06-02 | Stop reason: SDUPTHER

## 2020-01-28 RX ORDER — ROSUVASTATIN CALCIUM 5 MG/1
5 TABLET, COATED ORAL NIGHTLY
Qty: 30 TABLET | Refills: 5 | Status: SHIPPED | OUTPATIENT
Start: 2020-01-28 | End: 2020-06-02 | Stop reason: SDUPTHER

## 2020-01-28 RX ORDER — LEVOTHYROXINE SODIUM 0.05 MG/1
50 TABLET ORAL DAILY
Qty: 90 TABLET | Refills: 3 | Status: SHIPPED | OUTPATIENT
Start: 2020-01-28 | End: 2020-06-02 | Stop reason: SDUPTHER

## 2020-01-28 NOTE — PROGRESS NOTES
Subjective   Tyler Cabello is a 74 y.o. female.     Chief complaint  Joint pain  Hypothyroidism  Depression  Talk about my allergy consult    History of Present Illness:    74-year-old female here today to follow-up on multiple chronic conditions.    Chronic joint pain-she is under the care of pain management.  She has undergone spinal injections in both the cervical and lumbar spine.  Patient reports these were not very helpful at all.  She is scheduled to follow-up tomorrow with pain management.  Patient has not received any pain medication from pain management.  She is due for pain medication refills today.  I have encouraged her to wait and talk to pain management tomorrow and see if they are going to take over her pain medication.  Ms. Cabello has been very compliant with her medication regimen.  She has no history of substance abuse and addiction.  I feel pain management may be up to offer her better pain control than what family practice is capable of providing.    GERD-stable with current medication regimen.  She avoids spicy foods.  Taking Protonix daily.  We have discussed risk versus benefits of PPI with patient stating understanding.    Depression-stable with Prozac.  Denies thoughts of hurting self or others.    Multiple environmental allergies-patient has been seen by asthma and allergy since her last visit.  She is scheduled to start allergy injections in the next few weeks.    Hypothyroidism-stable with Synthroid 50 mcg daily.    Need for vaccination-patient is due a pneumonia shot today.    Type 2 diabetes-well-controlled with diet and lifestyle changes.    Lab Results   Component Value Date    HGBA1C 6.22 (H) 06/17/2019       The following portions of the patient's history, chief complaint and ROS were reviewed and updated as appropriate per provider:  Allergies, current medications, past family history, past medical history, past social history, past surgical history and problem list.    Review of  "Systems   Constitutional: Positive for activity change. Negative for appetite change, chills, diaphoresis, fever and unexpected weight change.   HENT: Positive for rhinorrhea and sneezing. Negative for congestion, ear discharge, ear pain, sinus pressure, sinus pain, sore throat and trouble swallowing.    Eyes: Negative for pain, discharge and itching.   Respiratory: Negative for cough, chest tightness, shortness of breath and wheezing.    Cardiovascular: Negative for chest pain, palpitations and leg swelling.   Gastrointestinal: Positive for constipation (Chronic). Negative for abdominal pain, blood in stool, nausea and vomiting.   Endocrine: Negative.    Genitourinary: Negative for difficulty urinating, dysuria, frequency and urgency.   Musculoskeletal: Positive for arthralgias, back pain, gait problem, myalgias and neck pain.   Skin: Negative.    Allergic/Immunologic: Positive for environmental allergies. Negative for food allergies and immunocompromised state.   Neurological: Negative for dizziness, seizures, speech difficulty, weakness and light-headedness.   Hematological: Negative.    Psychiatric/Behavioral: Positive for sleep disturbance (Chronic pain). Negative for agitation, confusion, dysphoric mood, self-injury and suicidal ideas.       Objective     /80 (BP Location: Left arm, Patient Position: Sitting, Cuff Size: Adult)   Pulse 87   Temp 98.5 °F (36.9 °C) (Oral)   Resp 18   Ht 157.5 cm (62.01\")   Wt 62.2 kg (137 lb 3.2 oz)   LMP 07/08/1986 (LMP Unknown)   SpO2 97%   BMI 25.09 kg/m²   Lab on 06/17/2019   Component Date Value Ref Range Status   • Glucose 06/17/2019 106* 65 - 99 mg/dL Final   • BUN 06/17/2019 15  8 - 23 mg/dL Final   • Creatinine 06/17/2019 0.86  0.57 - 1.00 mg/dL Final   • Sodium 06/17/2019 143  136 - 145 mmol/L Final   • Potassium 06/17/2019 4.4  3.5 - 5.2 mmol/L Final   • Chloride 06/17/2019 104  98 - 107 mmol/L Final   • CO2 06/17/2019 24.9  22.0 - 29.0 mmol/L Final   • " Calcium 06/17/2019 9.8  8.6 - 10.5 mg/dL Final   • Total Protein 06/17/2019 6.9  6.0 - 8.5 g/dL Final   • Albumin 06/17/2019 4.60  3.50 - 5.20 g/dL Final   • ALT (SGPT) 06/17/2019 35* 1 - 33 U/L Final   • AST (SGOT) 06/17/2019 29  1 - 32 U/L Final   • Alkaline Phosphatase 06/17/2019 62  39 - 117 U/L Final   • Total Bilirubin 06/17/2019 0.4  0.2 - 1.2 mg/dL Final   • eGFR Non  Amer 06/17/2019 65  >60 mL/min/1.73 Final   • eGFR   Amer 06/17/2019 78  >60 mL/min/1.73 Final   • Globulin 06/17/2019 2.3  gm/dL Final   • A/G Ratio 06/17/2019 2.0  g/dL Final   • BUN/Creatinine Ratio 06/17/2019 17.4  7.0 - 25.0 Final   • Anion Gap 06/17/2019 14.1  mmol/L Final   • TSH 06/17/2019 2.880  0.270 - 4.200 mIU/mL Final   • Hemoglobin A1C 06/17/2019 6.22* 4.80 - 5.60 % Final   • 25 Hydroxy, Vitamin D 06/17/2019 37.7  30.0 - 100.0 ng/ml Final   • Vitamin B-12 06/17/2019 858  211 - 946 pg/mL Final   • Total Cholesterol 06/17/2019 244* 0 - 200 mg/dL Final   • Triglycerides 06/17/2019 269* 0 - 150 mg/dL Final   • HDL Cholesterol 06/17/2019 41  40 - 60 mg/dL Final   • LDL Cholesterol  06/17/2019 149* 0 - 100 mg/dL Final   • VLDL Cholesterol 06/17/2019 53.8* 5 - 40 mg/dL Final   • LDL/HDL Ratio 06/17/2019 3.64   Final   • WBC 06/17/2019 5.94  3.40 - 10.80 10*3/mm3 Final   • RBC 06/17/2019 4.48  3.77 - 5.28 10*6/mm3 Final   • Hemoglobin 06/17/2019 13.5  12.0 - 15.9 g/dL Final   • Hematocrit 06/17/2019 42.8  34.0 - 46.6 % Final   • MCV 06/17/2019 95.5  79.0 - 97.0 fL Final   • MCH 06/17/2019 30.1  26.6 - 33.0 pg Final   • MCHC 06/17/2019 31.5  31.5 - 35.7 g/dL Final   • RDW 06/17/2019 13.2  12.3 - 15.4 % Final   • RDW-SD 06/17/2019 46.3  37.0 - 54.0 fl Final   • MPV 06/17/2019 11.2  6.0 - 12.0 fL Final   • Platelets 06/17/2019 310  140 - 450 10*3/mm3 Final   • Neutrophil % 06/17/2019 51.1  42.7 - 76.0 % Final   • Lymphocyte % 06/17/2019 39.6  19.6 - 45.3 % Final   • Monocyte % 06/17/2019 5.6  5.0 - 12.0 % Final   • Eosinophil %  06/17/2019 2.5  0.3 - 6.2 % Final   • Basophil % 06/17/2019 1.0  0.0 - 1.5 % Final   • Immature Grans % 06/17/2019 0.2  0.0 - 0.5 % Final   • Neutrophils, Absolute 06/17/2019 3.04  1.70 - 7.00 10*3/mm3 Final   • Lymphocytes, Absolute 06/17/2019 2.35  0.70 - 3.10 10*3/mm3 Final   • Monocytes, Absolute 06/17/2019 0.33  0.10 - 0.90 10*3/mm3 Final   • Eosinophils, Absolute 06/17/2019 0.15  0.00 - 0.40 10*3/mm3 Final   • Basophils, Absolute 06/17/2019 0.06  0.00 - 0.20 10*3/mm3 Final   • Immature Grans, Absolute 06/17/2019 0.01  0.00 - 0.05 10*3/mm3 Final   • nRBC 06/17/2019 0.0  0.0 - 0.2 /100 WBC Final       Physical Exam   Constitutional: She is oriented to person, place, and time. Vital signs are normal. She appears well-developed and well-nourished. She is cooperative.  Non-toxic appearance. She does not have a sickly appearance. She does not appear ill. No distress.   Ms. Cabello is a very pleasant 74-year-old female.  She does appear to be in pain as she shifts position often.   HENT:   Head: Atraumatic.   Right Ear: Tympanic membrane, external ear and ear canal normal.   Left Ear: Tympanic membrane, external ear and ear canal normal.   Nose: Nose normal.   Mouth/Throat: No oropharyngeal exudate.   Eyes: Pupils are equal, round, and reactive to light. Right eye exhibits no discharge. Left eye exhibits no discharge.   Neck: Normal range of motion. Neck supple. Spinous process tenderness and muscular tenderness present. Carotid bruit is not present. No neck rigidity. Normal range of motion present. No thyromegaly present.   Cardiovascular: Normal rate, regular rhythm, normal heart sounds and intact distal pulses.   No murmur heard.  Pulmonary/Chest: Effort normal. No respiratory distress. She has no wheezes.   Abdominal: Soft. Bowel sounds are normal. She exhibits no mass. There is no tenderness.   Musculoskeletal:        Right knee: She exhibits decreased range of motion.        Left knee: She exhibits decreased  range of motion.        Cervical back: She exhibits decreased range of motion and tenderness.        Lumbar back: She exhibits decreased range of motion and tenderness.   Stiffness and limited range of motion in both knees   Lymphadenopathy:     She has no cervical adenopathy.   Neurological: She is alert and oriented to person, place, and time. No cranial nerve deficit.   Skin: Skin is warm and dry. Capillary refill takes less than 2 seconds. No rash noted. She is not diaphoretic. No erythema. No pallor.   Psychiatric: She has a normal mood and affect. Her behavior is normal. Judgment and thought content normal.   Vitals reviewed.      Assessment/Plan     Problem List Items Addressed This Visit        Digestive    Gastroesophageal reflux disease with esophagitis    Relevant Medications    pantoprazole (PROTONIX) 40 MG EC tablet       Endocrine    Type 2 diabetes mellitus without complication (CMS/[facility]) - Primary    Relevant Orders    Ambulatory Referral for Diabetic Eye Exam-Optometry    Acquired hypothyroidism    Relevant Medications    levothyroxine (SYNTHROID) 50 MCG tablet       Musculoskeletal and Integument    Chronic pain of right knee    DDD (degenerative disc disease), cervical    Overview     Imaging on file         Relevant Medications    diclofenac (VOLTAREN) 1 % gel gel       Other    Encounter for immunization    Relevant Medications    pneumococcal polysaccharide 23-valent (PNEUMOVAX-23) vaccine 0.5 mL (Completed)    Reactive depression    Relevant Medications    FLUoxetine (PROZAC) 20 MG capsule      Other Visit Diagnoses     Other seasonal allergic rhinitis        stable    Relevant Medications    cetirizine (zyrTEC) 10 MG tablet    montelukast (SINGULAIR) 10 MG tablet    Localized edema        Relevant Medications    furosemide (LASIX) 20 MG tablet    potassium chloride ER (K-TAB) 20 MEQ tablet controlled-release ER tablet    Restless leg syndrome        Relevant Medications    rOPINIRole (REQUIP)  2 MG tablet          Current Outpatient Medications:   •  baclofen (LIORESAL) 10 MG tablet, Take 1 tablet by mouth 3 (Three) Times a Day As Needed for Muscle Spasms., Disp: 90 tablet, Rfl: 3  •  cetirizine (zyrTEC) 10 MG tablet, Take 1 tablet by mouth 2 (Two) Times a Day., Disp: 180 tablet, Rfl: 3  •  FLUoxetine (PROZAC) 20 MG capsule, Take 1 capsule by mouth Daily., Disp: 30 capsule, Rfl: 5  •  furosemide (LASIX) 20 MG tablet, Take 1 tablet by mouth Daily As Needed (swelling)., Disp: 30 tablet, Rfl: 5  •  gabapentin (NEURONTIN) 100 MG capsule, 1 tablet twice daily, Disp: 60 capsule, Rfl: 2  •  HYDROcodone-acetaminophen (NORCO) 7.5-325 MG per tablet, Take 1 tablet by mouth Every 6 (Six) Hours As Needed for Moderate Pain ., Disp: 60 tablet, Rfl: 0  •  levothyroxine (SYNTHROID) 50 MCG tablet, Take 1 tablet by mouth Daily., Disp: 90 tablet, Rfl: 3  •  montelukast (SINGULAIR) 10 MG tablet, Take 1 tablet by mouth Every Night., Disp: 90 tablet, Rfl: 3  •  pantoprazole (PROTONIX) 40 MG EC tablet, Take 1 tablet by mouth Daily., Disp: 90 tablet, Rfl: 3  •  potassium chloride ER (K-TAB) 20 MEQ tablet controlled-release ER tablet, Take 1 tablet by mouth Daily As Needed (when take lasix)., Disp: 30 tablet, Rfl: 5  •  rOPINIRole (REQUIP) 2 MG tablet, Take 1 tablet by mouth Every Night. Take 1 hour before bedtime., Disp: 90 tablet, Rfl: 3  •  rosuvastatin (CRESTOR) 5 MG tablet, Take 1 tablet by mouth Every Night., Disp: 30 tablet, Rfl: 5  •  traMADol ER (ULTRAM-ER) 100 MG 24 hr tablet, Take 2 tablets by mouth Daily., Disp: 60 tablet, Rfl: 2  •  triamcinolone (KENALOG) 0.1 % cream, APPLY EXTERNALLY TO THE AFFECTED AREA TWICE DAILY, Disp: 80 g, Rfl: 0  •  verapamil SR (CALAN-SR) 240 MG CR tablet, Take 1 tablet by mouth Every Night., Disp: 90 tablet, Rfl: 3  •  cromolyn (GASTROCROM) 100 MG/5ML solution, TK 5 ML PO 15 TO 30 MIN B DINNER, Disp: , Rfl:   •  diclofenac (VOLTAREN) 1 % gel gel, Apply 4 g topically to the appropriate area  as directed 4 (Four) Times a Day As Needed (pain)., Disp: 4 tube, Rfl: 3  •  linaclotide (LINZESS) 290 MCG capsule capsule, Take 1 capsule by mouth Every Morning Before Breakfast., Disp: 30 capsule, Rfl: 5  No current facility-administered medications for this visit.        Patient's Body mass index is 25.09 kg/m². BMI is within normal parameters. No follow-up required..    New medication of diclofenac gel. Discussed risk vs benefit.     Tyler Cabello  reports that she has quit smoking. Her smoking use included cigarettes. She has a 12.50 pack-year smoking history. She quit smokeless tobacco use about 11 years ago.. I have educated her on the risk of diseases from using tobacco products such as cancer, COPD and heart diease.     Fill routine medications.  [facility]  Reviewed recent allergy consult.  Discussed precautions and environmental allergens to avoid.    I have discussed diagnosis in detail today allowing time for questions and answers. Patient is aware of reasons to seek urgent or emergent medical care as well as reasons to return to the clinic for evaluation. Possible side effects, interactions and progression of symptoms discussed as well. Patient / family states understanding.   Emotional support and active listening provided.     I have encouraged Ms. Cabello to talk with Dr. Rutledge tomorrow during her appointment about taking over her pain mediation. I explained to Tyler that with her severity of pain, Dr. Rutledge will be able to offer options for improved pain control which will improve her quality of life. She states understanding. Tyler has been compliant with her medications, Diaz and urine drug screen.  If for some reason pain management is unable to take over her treatment/prescriptions I will be happy to continue writing her current medications.    Diabetes education provided.  Sick day rules reviewed.  Monitor glucose and report any abnormal  readings.  Discussed methods to prevent hypo-and hyperglycemia.    I would like to see her back in about 3 months, sooner if needed.  Fasting labs 1 week prior.          This document has been electronically signed by:  ZHOU Moncada, NP-C

## 2020-02-21 DIAGNOSIS — Z79.899 LONG-TERM USE OF HIGH-RISK MEDICATION: Primary | ICD-10-CM

## 2020-03-09 RX ORDER — AZITHROMYCIN 250 MG/1
TABLET, FILM COATED ORAL
Qty: 6 TABLET | Refills: 0 | Status: SHIPPED | OUTPATIENT
Start: 2020-03-09 | End: 2020-06-02

## 2020-03-10 ENCOUNTER — TELEPHONE (OUTPATIENT)
Dept: FAMILY MEDICINE CLINIC | Facility: CLINIC | Age: 75
End: 2020-03-10

## 2020-03-10 NOTE — TELEPHONE ENCOUNTER
----- Message from ZHOU Villar sent at 3/9/2020  6:59 PM EDT -----  Done, tell pt please  ----- Message -----  From: Nahed Martinez MA  Sent: 3/9/2020  10:14 AM EDT  To: ZHOU Villar    Patient called and wanted to know if you could send her in a antibiotic for a chest cold.

## 2020-06-02 ENCOUNTER — OFFICE VISIT (OUTPATIENT)
Dept: FAMILY MEDICINE CLINIC | Facility: CLINIC | Age: 75
End: 2020-06-02

## 2020-06-02 VITALS
OXYGEN SATURATION: 93 % | TEMPERATURE: 97.3 F | HEIGHT: 62 IN | DIASTOLIC BLOOD PRESSURE: 80 MMHG | HEART RATE: 67 BPM | SYSTOLIC BLOOD PRESSURE: 140 MMHG | BODY MASS INDEX: 25.95 KG/M2 | WEIGHT: 141 LBS

## 2020-06-02 DIAGNOSIS — E55.9 VITAMIN D DEFICIENCY: ICD-10-CM

## 2020-06-02 DIAGNOSIS — G89.29 CHRONIC BILATERAL LOW BACK PAIN WITH BILATERAL SCIATICA: ICD-10-CM

## 2020-06-02 DIAGNOSIS — J30.2 OTHER SEASONAL ALLERGIC RHINITIS: ICD-10-CM

## 2020-06-02 DIAGNOSIS — M54.42 CHRONIC BILATERAL LOW BACK PAIN WITH BILATERAL SCIATICA: ICD-10-CM

## 2020-06-02 DIAGNOSIS — K21.00 GASTROESOPHAGEAL REFLUX DISEASE WITH ESOPHAGITIS: ICD-10-CM

## 2020-06-02 DIAGNOSIS — R60.0 LOCALIZED EDEMA: ICD-10-CM

## 2020-06-02 DIAGNOSIS — E11.9 TYPE 2 DIABETES MELLITUS WITHOUT COMPLICATION, WITHOUT LONG-TERM CURRENT USE OF INSULIN (HCC): Primary | ICD-10-CM

## 2020-06-02 DIAGNOSIS — G25.81 RESTLESS LEG SYNDROME: ICD-10-CM

## 2020-06-02 DIAGNOSIS — I10 ESSENTIAL HYPERTENSION: ICD-10-CM

## 2020-06-02 DIAGNOSIS — E03.9 ACQUIRED HYPOTHYROIDISM: ICD-10-CM

## 2020-06-02 DIAGNOSIS — M54.41 CHRONIC BILATERAL LOW BACK PAIN WITH BILATERAL SCIATICA: ICD-10-CM

## 2020-06-02 DIAGNOSIS — K59.00 CONSTIPATION, UNSPECIFIED CONSTIPATION TYPE: ICD-10-CM

## 2020-06-02 DIAGNOSIS — M50.30 DDD (DEGENERATIVE DISC DISEASE), CERVICAL: ICD-10-CM

## 2020-06-02 DIAGNOSIS — F32.9 REACTIVE DEPRESSION: ICD-10-CM

## 2020-06-02 PROCEDURE — 99214 OFFICE O/P EST MOD 30 MIN: CPT | Performed by: NURSE PRACTITIONER

## 2020-06-02 PROCEDURE — 96372 THER/PROPH/DIAG INJ SC/IM: CPT | Performed by: NURSE PRACTITIONER

## 2020-06-02 RX ORDER — FUROSEMIDE 20 MG/1
20 TABLET ORAL DAILY PRN
Qty: 90 TABLET | Refills: 3 | Status: SHIPPED | OUTPATIENT
Start: 2020-06-02 | End: 2021-03-02 | Stop reason: SDUPTHER

## 2020-06-02 RX ORDER — METHYLPREDNISOLONE ACETATE 40 MG/ML
40 INJECTION, SUSPENSION INTRA-ARTICULAR; INTRALESIONAL; INTRAMUSCULAR; SOFT TISSUE ONCE
Status: COMPLETED | OUTPATIENT
Start: 2020-06-02 | End: 2020-06-02

## 2020-06-02 RX ORDER — ROSUVASTATIN CALCIUM 5 MG/1
5 TABLET, COATED ORAL NIGHTLY
Qty: 90 TABLET | Refills: 3 | Status: SHIPPED | OUTPATIENT
Start: 2020-06-02 | End: 2021-03-02 | Stop reason: SDUPTHER

## 2020-06-02 RX ORDER — ROPINIROLE 2 MG/1
2 TABLET, FILM COATED ORAL NIGHTLY
Qty: 90 TABLET | Refills: 3 | Status: SHIPPED | OUTPATIENT
Start: 2020-06-02 | End: 2021-03-02 | Stop reason: SDUPTHER

## 2020-06-02 RX ORDER — FLUOXETINE HYDROCHLORIDE 20 MG/1
20 CAPSULE ORAL DAILY
Qty: 30 CAPSULE | Refills: 5 | Status: SHIPPED | OUTPATIENT
Start: 2020-06-02 | End: 2021-03-02 | Stop reason: SDUPTHER

## 2020-06-02 RX ORDER — POTASSIUM CHLORIDE 1500 MG/1
20 TABLET, FILM COATED, EXTENDED RELEASE ORAL DAILY PRN
Qty: 90 TABLET | Refills: 3 | Status: SHIPPED | OUTPATIENT
Start: 2020-06-02 | End: 2021-03-02 | Stop reason: SDUPTHER

## 2020-06-02 RX ORDER — VERAPAMIL HYDROCHLORIDE 240 MG/1
240 TABLET, FILM COATED, EXTENDED RELEASE ORAL NIGHTLY
Qty: 90 TABLET | Refills: 3 | Status: SHIPPED | OUTPATIENT
Start: 2020-06-02 | End: 2021-03-02 | Stop reason: SDUPTHER

## 2020-06-02 RX ORDER — PANTOPRAZOLE SODIUM 40 MG/1
40 TABLET, DELAYED RELEASE ORAL DAILY
Qty: 90 TABLET | Refills: 3 | Status: SHIPPED | OUTPATIENT
Start: 2020-06-02 | End: 2021-03-02 | Stop reason: SDUPTHER

## 2020-06-02 RX ORDER — LEVOTHYROXINE SODIUM 0.05 MG/1
50 TABLET ORAL DAILY
Qty: 90 TABLET | Refills: 3 | Status: SHIPPED | OUTPATIENT
Start: 2020-06-02 | End: 2021-03-02 | Stop reason: SDUPTHER

## 2020-06-02 RX ORDER — MONTELUKAST SODIUM 10 MG/1
10 TABLET ORAL NIGHTLY
Qty: 90 TABLET | Refills: 3 | Status: SHIPPED | OUTPATIENT
Start: 2020-06-02 | End: 2021-03-02 | Stop reason: SDUPTHER

## 2020-06-02 RX ORDER — KETOROLAC TROMETHAMINE 30 MG/ML
60 INJECTION, SOLUTION INTRAMUSCULAR; INTRAVENOUS ONCE
Status: COMPLETED | OUTPATIENT
Start: 2020-06-02 | End: 2020-06-02

## 2020-06-02 RX ADMIN — KETOROLAC TROMETHAMINE 60 MG: 30 INJECTION, SOLUTION INTRAMUSCULAR; INTRAVENOUS at 15:32

## 2020-06-02 RX ADMIN — METHYLPREDNISOLONE ACETATE 40 MG: 40 INJECTION, SUSPENSION INTRA-ARTICULAR; INTRALESIONAL; INTRAMUSCULAR; SOFT TISSUE at 15:33

## 2020-06-02 NOTE — ASSESSMENT & PLAN NOTE
Trulance samples provided.  Hold Linzess during trial.  Discussed possible side effects.  If Trulance is helpful with her symptoms she is to stop Linzess and notify office so a true Michael prescription can be provided.  Discussed with patient the possibility that her verapamil may be causing constipation.  Patient does not want to change blood pressure medications at this time as this is been very helpful for her.

## 2020-06-02 NOTE — PROGRESS NOTES
Cathryn Cabello is a 75 y.o. female.     Chief complaint  Diabetes  Worsening joint pain  Hypertension    History of Present Illness:    Patient reports an exacerbation of chronic joint pain.  She is under the care of pain management.  Due to coronavirus pandemic patient has been without medication until yesterday.  She has developed an exacerbation of low back, hip and knee pain.  Rates her pain as 9 on pain scale rating of 0-10.  Requesting injections today due to pain exacerbation.  Patient is considering having a spinal stimulator placed.  She currently receives tramadol and hydrocodone from her pain management provider.    GERD-stable with current medication.    Depression-stable with Prozac.  Denies thoughts of hurting self or others.    Environmental allergies- she is under the care of asthma and allergy.  She has started allergy injections.  Reports improvement in her symptoms.    Hypothyroidism-stable with Synthroid.    Type 2 diabetes-fairly well controlled with lifestyle changes.  She is due for fasting labs.  Denies any symptomatic hypoglycemia or hyperglycemia.  Lab Results   Component Value Date    HGBA1C 6.22 (H) 06/17/2019     Patient was scheduled for fasting labs during the middle of the coronavirus pandemic.  Her appointment was canceled.    Constipation-patient reports that Linzess is no longer helping as well with her constipation.  She is wondering if there is some other medication that may help her better.        Patient has had bilateral cataract surgery since last visit.      The following portions of the patient's history and ROS were reviewed and updated as appropriate per provider:  Allergies, current medications, past family history, past medical history, past social history, past surgical history and problem list.    Review of Systems   Constitutional: Negative for activity change, appetite change, fatigue and fever.   HENT: Negative for congestion, sinus pressure, sinus pain,  "sore throat and trouble swallowing.    Eyes: Negative for pain, discharge, redness and itching.   Respiratory: Negative for cough, chest tightness, shortness of breath and wheezing.    Cardiovascular: Negative for chest pain and palpitations.   Gastrointestinal: Positive for constipation. Negative for abdominal pain, diarrhea, nausea and rectal pain.   Endocrine: Negative.    Musculoskeletal: Positive for arthralgias, back pain, gait problem (Chronic knee pain) and neck pain. Negative for neck stiffness.   Skin: Negative.    Allergic/Immunologic: Positive for environmental allergies and food allergies. Negative for immunocompromised state.   Neurological: Negative for dizziness, tremors, facial asymmetry and light-headedness.   Hematological: Negative.    Psychiatric/Behavioral: Negative for dysphoric mood, self-injury and suicidal ideas.       Objective     /80   Pulse 67   Temp 97.3 °F (36.3 °C) (Tympanic)   Ht 157.5 cm (62.01\")   Wt 64 kg (141 lb)   LMP 07/08/1986 (LMP Unknown)   SpO2 93%   BMI 25.78 kg/m²   Lab on 06/17/2019   Component Date Value Ref Range Status   • Glucose 06/17/2019 106* 65 - 99 mg/dL Final   • BUN 06/17/2019 15  8 - 23 mg/dL Final   • Creatinine 06/17/2019 0.86  0.57 - 1.00 mg/dL Final   • Sodium 06/17/2019 143  136 - 145 mmol/L Final   • Potassium 06/17/2019 4.4  3.5 - 5.2 mmol/L Final   • Chloride 06/17/2019 104  98 - 107 mmol/L Final   • CO2 06/17/2019 24.9  22.0 - 29.0 mmol/L Final   • Calcium 06/17/2019 9.8  8.6 - 10.5 mg/dL Final   • Total Protein 06/17/2019 6.9  6.0 - 8.5 g/dL Final   • Albumin 06/17/2019 4.60  3.50 - 5.20 g/dL Final   • ALT (SGPT) 06/17/2019 35* 1 - 33 U/L Final   • AST (SGOT) 06/17/2019 29  1 - 32 U/L Final   • Alkaline Phosphatase 06/17/2019 62  39 - 117 U/L Final   • Total Bilirubin 06/17/2019 0.4  0.2 - 1.2 mg/dL Final   • eGFR Non  Amer 06/17/2019 65  >60 mL/min/1.73 Final   • eGFR   Amer 06/17/2019 78  >60 mL/min/1.73 Final   • " Globulin 06/17/2019 2.3  gm/dL Final   • A/G Ratio 06/17/2019 2.0  g/dL Final   • BUN/Creatinine Ratio 06/17/2019 17.4  7.0 - 25.0 Final   • Anion Gap 06/17/2019 14.1  mmol/L Final   • TSH 06/17/2019 2.880  0.270 - 4.200 mIU/mL Final   • Hemoglobin A1C 06/17/2019 6.22* 4.80 - 5.60 % Final   • 25 Hydroxy, Vitamin D 06/17/2019 37.7  30.0 - 100.0 ng/ml Final   • Vitamin B-12 06/17/2019 858  211 - 946 pg/mL Final   • Total Cholesterol 06/17/2019 244* 0 - 200 mg/dL Final   • Triglycerides 06/17/2019 269* 0 - 150 mg/dL Final   • HDL Cholesterol 06/17/2019 41  40 - 60 mg/dL Final   • LDL Cholesterol  06/17/2019 149* 0 - 100 mg/dL Final   • VLDL Cholesterol 06/17/2019 53.8* 5 - 40 mg/dL Final   • LDL/HDL Ratio 06/17/2019 3.64   Final   • WBC 06/17/2019 5.94  3.40 - 10.80 10*3/mm3 Final   • RBC 06/17/2019 4.48  3.77 - 5.28 10*6/mm3 Final   • Hemoglobin 06/17/2019 13.5  12.0 - 15.9 g/dL Final   • Hematocrit 06/17/2019 42.8  34.0 - 46.6 % Final   • MCV 06/17/2019 95.5  79.0 - 97.0 fL Final   • MCH 06/17/2019 30.1  26.6 - 33.0 pg Final   • MCHC 06/17/2019 31.5  31.5 - 35.7 g/dL Final   • RDW 06/17/2019 13.2  12.3 - 15.4 % Final   • RDW-SD 06/17/2019 46.3  37.0 - 54.0 fl Final   • MPV 06/17/2019 11.2  6.0 - 12.0 fL Final   • Platelets 06/17/2019 310  140 - 450 10*3/mm3 Final   • Neutrophil % 06/17/2019 51.1  42.7 - 76.0 % Final   • Lymphocyte % 06/17/2019 39.6  19.6 - 45.3 % Final   • Monocyte % 06/17/2019 5.6  5.0 - 12.0 % Final   • Eosinophil % 06/17/2019 2.5  0.3 - 6.2 % Final   • Basophil % 06/17/2019 1.0  0.0 - 1.5 % Final   • Immature Grans % 06/17/2019 0.2  0.0 - 0.5 % Final   • Neutrophils, Absolute 06/17/2019 3.04  1.70 - 7.00 10*3/mm3 Final   • Lymphocytes, Absolute 06/17/2019 2.35  0.70 - 3.10 10*3/mm3 Final   • Monocytes, Absolute 06/17/2019 0.33  0.10 - 0.90 10*3/mm3 Final   • Eosinophils, Absolute 06/17/2019 0.15  0.00 - 0.40 10*3/mm3 Final   • Basophils, Absolute 06/17/2019 0.06  0.00 - 0.20 10*3/mm3 Final   •  Immature Grans, Absolute 06/17/2019 0.01  0.00 - 0.05 10*3/mm3 Final   • nRBC 06/17/2019 0.0  0.0 - 0.2 /100 WBC Final       Physical Exam   Constitutional: She is oriented to person, place, and time. Vital signs are normal. She appears well-developed and well-nourished. No distress.   HENT:   Head: Normocephalic.   Right Ear: External ear normal.   Left Ear: External ear normal.   Nose: Nose normal.   Mouth/Throat: Oropharynx is clear and moist. No oropharyngeal exudate.   Eyes: Pupils are equal, round, and reactive to light. Conjunctivae, EOM and lids are normal. Right eye exhibits no discharge. Left eye exhibits no discharge.   Neck: Normal range of motion. Neck supple. No tracheal deviation present. No thyromegaly present.   Cardiovascular: Normal rate, regular rhythm and normal heart sounds. Exam reveals no gallop and no friction rub.   No murmur heard.  Pulmonary/Chest: Effort normal and breath sounds normal. No respiratory distress. She has no wheezes. She has no rales. She exhibits no tenderness.   Abdominal: Soft. Normal appearance and bowel sounds are normal. She exhibits no distension and no mass. There is no tenderness. There is no rebound and no guarding.   Musculoskeletal:        Right knee: She exhibits decreased range of motion and swelling.        Left knee: She exhibits decreased range of motion and swelling.        Lumbar back: She exhibits decreased range of motion.   Lymphadenopathy:     She has no cervical adenopathy.   Neurological: She is alert and oriented to person, place, and time. She has normal reflexes. She is not disoriented.   CN 2-12 grossly intact    Skin: Skin is warm and dry. Capillary refill takes less than 2 seconds. No rash noted. She is not diaphoretic. No erythema.   Psychiatric: She has a normal mood and affect. Her speech is normal and behavior is normal. Judgment and thought content normal. Cognition and memory are normal.   Vitals reviewed.      Assessment/Plan      Problem List Items Addressed This Visit        Cardiovascular and Mediastinum    Essential hypertension    Relevant Medications    verapamil SR (CALAN-SR) 240 MG CR tablet    furosemide (LASIX) 20 MG tablet    Other Relevant Orders    CBC & Differential    Comprehensive Metabolic Panel    TSH    Hemoglobin A1c    Vitamin D 25 Hydroxy    Vitamin B12    Lipid Panel    MicroAlbumin, Urine, Random - Urine, Clean Catch       Digestive    Gastroesophageal reflux disease with esophagitis    Relevant Medications    pantoprazole (PROTONIX) 40 MG EC tablet    Other Relevant Orders    CBC & Differential    Comprehensive Metabolic Panel    TSH    Hemoglobin A1c    Vitamin D 25 Hydroxy    Vitamin B12    Lipid Panel    Constipation    Current Assessment & Plan     Trulance samples provided.  Hold Linzess during trial.  Discussed possible side effects.  If Trulance is helpful with her symptoms she is to stop Linzess and notify office so a true Michael prescription can be provided.  Discussed with patient the possibility that her verapamil may be causing constipation.  Patient does not want to change blood pressure medications at this time as this is been very helpful for her.         Relevant Medications    linaclotide (LINZESS) 290 MCG capsule capsule    Plecanatide (Trulance) 3 MG tablet       Endocrine    Type 2 diabetes mellitus without complication (CMS/[facility]) - Primary    Relevant Medications    rosuvastatin (CRESTOR) 5 MG tablet    Other Relevant Orders    CBC & Differential    Comprehensive Metabolic Panel    TSH    Hemoglobin A1c    Vitamin D 25 Hydroxy    Vitamin B12    Lipid Panel    Acquired hypothyroidism    Relevant Medications    levothyroxine (Synthroid) 50 MCG tablet    Other Relevant Orders    CBC & Differential    Comprehensive Metabolic Panel    TSH    Hemoglobin A1c    Vitamin D 25 Hydroxy    Vitamin B12    Lipid Panel       Nervous and Auditory    Chronic bilateral low back pain with bilateral sciatica     Relevant Medications    ketorolac (TORADOL) injection 60 mg (Completed)    methylPREDNISolone acetate (DEPO-medrol) injection 40 mg (Completed)    Other Relevant Orders    CBC & Differential    Comprehensive Metabolic Panel    TSH    Hemoglobin A1c    Vitamin D 25 Hydroxy    Vitamin B12    Lipid Panel       Musculoskeletal and Integument    DDD (degenerative disc disease), cervical    Overview     Imaging on file         Relevant Medications    diclofenac (VOLTAREN) 1 % gel gel       Other    Reactive depression    Relevant Medications    FLUoxetine (PROzac) 20 MG capsule      Other Visit Diagnoses     Vitamin D deficiency        Relevant Orders    Vitamin D 25 Hydroxy    Localized edema        Relevant Medications    furosemide (LASIX) 20 MG tablet    potassium chloride ER (K-TAB) 20 MEQ tablet controlled-release ER tablet    Restless leg syndrome        Relevant Medications    rOPINIRole (REQUIP) 2 MG tablet    Other seasonal allergic rhinitis        stable    Relevant Medications    montelukast (Singulair) 10 MG tablet         Pt has been educated/instructed on diabetic care and protocols.  Diabetic diet instructions provided.  Medication regimen reviewed.  Discussed possible side effects and interactions of current medications.  Sick day rules reviewed. Continue to monitor blood sugar and report abnormal readings as discussed today. Understanding stated by patient.       Pt has been instructed today regarding low fat heart smart diet. Weight management and routine exercise has been recommended. Avoid high fat foods, starchy foods and processed foods. Increase lean meats, fresh vegetables and fresh fruits.     Proper body mechanics has been reviewed and discussed today.           Patient's Body mass index is 25.78 kg/m². BMI is within normal parameters. No follow-up required.  Acceptable for patient's body type.      I have discussed diagnosis in detail today allowing time for questions and answers. Patient is  aware of reasons to seek urgent or emergent medical care as well as reasons to return to the clinic for evaluation. Possible side effects, interactions and progression of symptoms discussed as well. Patient / family states understanding.   Emotional support and active listening provided.       Follow up 3 months, sooner if needed.   Routine labs every 3-6 months.     Fasting labs next week.           This document has been electronically signed by:  ZHOU Moncada, NP-C

## 2020-06-09 ENCOUNTER — LAB (OUTPATIENT)
Dept: FAMILY MEDICINE CLINIC | Facility: CLINIC | Age: 75
End: 2020-06-09

## 2020-06-09 DIAGNOSIS — E11.9 TYPE 2 DIABETES MELLITUS WITHOUT COMPLICATION, WITHOUT LONG-TERM CURRENT USE OF INSULIN (HCC): ICD-10-CM

## 2020-06-09 DIAGNOSIS — M54.42 CHRONIC BILATERAL LOW BACK PAIN WITH BILATERAL SCIATICA: ICD-10-CM

## 2020-06-09 DIAGNOSIS — E55.9 VITAMIN D DEFICIENCY: ICD-10-CM

## 2020-06-09 DIAGNOSIS — M54.41 CHRONIC BILATERAL LOW BACK PAIN WITH BILATERAL SCIATICA: ICD-10-CM

## 2020-06-09 DIAGNOSIS — G89.29 CHRONIC BILATERAL LOW BACK PAIN WITH BILATERAL SCIATICA: ICD-10-CM

## 2020-06-09 DIAGNOSIS — K21.00 GASTROESOPHAGEAL REFLUX DISEASE WITH ESOPHAGITIS: ICD-10-CM

## 2020-06-09 DIAGNOSIS — I10 ESSENTIAL HYPERTENSION: ICD-10-CM

## 2020-06-09 DIAGNOSIS — E03.9 ACQUIRED HYPOTHYROIDISM: ICD-10-CM

## 2020-06-09 LAB
25(OH)D3 SERPL-MCNC: 44.2 NG/ML (ref 30–100)
ALBUMIN SERPL-MCNC: 4.7 G/DL (ref 3.5–5.2)
ALBUMIN UR-MCNC: <1.2 MG/DL
ALBUMIN/GLOB SERPL: 2 G/DL
ALP SERPL-CCNC: 58 U/L (ref 39–117)
ALT SERPL W P-5'-P-CCNC: 23 U/L (ref 1–33)
ANION GAP SERPL CALCULATED.3IONS-SCNC: 11.5 MMOL/L (ref 5–15)
AST SERPL-CCNC: 19 U/L (ref 1–32)
BASOPHILS # BLD AUTO: 0.06 10*3/MM3 (ref 0–0.2)
BASOPHILS NFR BLD AUTO: 0.8 % (ref 0–1.5)
BILIRUB SERPL-MCNC: 0.6 MG/DL (ref 0.2–1.2)
BUN BLD-MCNC: 18 MG/DL (ref 8–23)
BUN/CREAT SERPL: 20.7 (ref 7–25)
CALCIUM SPEC-SCNC: 9.6 MG/DL (ref 8.6–10.5)
CHLORIDE SERPL-SCNC: 104 MMOL/L (ref 98–107)
CHOLEST SERPL-MCNC: 155 MG/DL (ref 0–200)
CO2 SERPL-SCNC: 25.5 MMOL/L (ref 22–29)
CREAT BLD-MCNC: 0.87 MG/DL (ref 0.57–1)
DEPRECATED RDW RBC AUTO: 43.9 FL (ref 37–54)
EOSINOPHIL # BLD AUTO: 0.1 10*3/MM3 (ref 0–0.4)
EOSINOPHIL NFR BLD AUTO: 1.3 % (ref 0.3–6.2)
ERYTHROCYTE [DISTWIDTH] IN BLOOD BY AUTOMATED COUNT: 12.9 % (ref 12.3–15.4)
GFR SERPL CREATININE-BSD FRML MDRD: 64 ML/MIN/1.73
GFR SERPL CREATININE-BSD FRML MDRD: 77 ML/MIN/1.73
GLOBULIN UR ELPH-MCNC: 2.3 GM/DL
GLUCOSE BLD-MCNC: 103 MG/DL (ref 65–99)
HBA1C MFR BLD: 6.5 % (ref 4.8–5.6)
HCT VFR BLD AUTO: 39.7 % (ref 34–46.6)
HDLC SERPL-MCNC: 64 MG/DL (ref 40–60)
HGB BLD-MCNC: 13.8 G/DL (ref 12–15.9)
IMM GRANULOCYTES # BLD AUTO: 0.03 10*3/MM3 (ref 0–0.05)
IMM GRANULOCYTES NFR BLD AUTO: 0.4 % (ref 0–0.5)
LDLC SERPL CALC-MCNC: 75 MG/DL (ref 0–100)
LDLC/HDLC SERPL: 1.17 {RATIO}
LYMPHOCYTES # BLD AUTO: 2.51 10*3/MM3 (ref 0.7–3.1)
LYMPHOCYTES NFR BLD AUTO: 31.6 % (ref 19.6–45.3)
MCH RBC QN AUTO: 31.9 PG (ref 26.6–33)
MCHC RBC AUTO-ENTMCNC: 34.8 G/DL (ref 31.5–35.7)
MCV RBC AUTO: 91.7 FL (ref 79–97)
MONOCYTES # BLD AUTO: 0.49 10*3/MM3 (ref 0.1–0.9)
MONOCYTES NFR BLD AUTO: 6.2 % (ref 5–12)
NEUTROPHILS # BLD AUTO: 4.75 10*3/MM3 (ref 1.7–7)
NEUTROPHILS NFR BLD AUTO: 59.7 % (ref 42.7–76)
NRBC BLD AUTO-RTO: 0 /100 WBC (ref 0–0.2)
PLATELET # BLD AUTO: 339 10*3/MM3 (ref 140–450)
PMV BLD AUTO: 10.3 FL (ref 6–12)
POTASSIUM BLD-SCNC: 4.1 MMOL/L (ref 3.5–5.2)
PROT SERPL-MCNC: 7 G/DL (ref 6–8.5)
RBC # BLD AUTO: 4.33 10*6/MM3 (ref 3.77–5.28)
SODIUM BLD-SCNC: 141 MMOL/L (ref 136–145)
TRIGL SERPL-MCNC: 82 MG/DL (ref 0–150)
TSH SERPL DL<=0.05 MIU/L-ACNC: 2.83 UIU/ML (ref 0.27–4.2)
VIT B12 BLD-MCNC: 1146 PG/ML (ref 211–946)
VLDLC SERPL-MCNC: 16.4 MG/DL (ref 5–40)
WBC NRBC COR # BLD: 7.94 10*3/MM3 (ref 3.4–10.8)

## 2020-06-09 PROCEDURE — 83036 HEMOGLOBIN GLYCOSYLATED A1C: CPT | Performed by: NURSE PRACTITIONER

## 2020-06-09 PROCEDURE — 82607 VITAMIN B-12: CPT | Performed by: NURSE PRACTITIONER

## 2020-06-09 PROCEDURE — 80050 GENERAL HEALTH PANEL: CPT | Performed by: NURSE PRACTITIONER

## 2020-06-09 PROCEDURE — 80061 LIPID PANEL: CPT | Performed by: NURSE PRACTITIONER

## 2020-06-09 PROCEDURE — 82043 UR ALBUMIN QUANTITATIVE: CPT | Performed by: NURSE PRACTITIONER

## 2020-06-09 PROCEDURE — 82306 VITAMIN D 25 HYDROXY: CPT | Performed by: NURSE PRACTITIONER

## 2020-08-12 DIAGNOSIS — M19.90 ARTHRITIS: ICD-10-CM

## 2020-08-12 RX ORDER — IBUPROFEN 800 MG/1
TABLET ORAL
Qty: 90 TABLET | Refills: 3 | Status: SHIPPED | OUTPATIENT
Start: 2020-08-12 | End: 2020-08-13 | Stop reason: SDUPTHER

## 2020-08-13 DIAGNOSIS — M19.90 ARTHRITIS: ICD-10-CM

## 2020-08-13 RX ORDER — IBUPROFEN 800 MG/1
800 TABLET ORAL EVERY 8 HOURS PRN
Qty: 90 TABLET | Refills: 3 | Status: SHIPPED | OUTPATIENT
Start: 2020-08-13 | End: 2020-08-17 | Stop reason: SDUPTHER

## 2020-08-17 DIAGNOSIS — M19.90 ARTHRITIS: ICD-10-CM

## 2020-08-17 RX ORDER — IBUPROFEN 800 MG/1
800 TABLET ORAL EVERY 8 HOURS PRN
Qty: 90 TABLET | Refills: 3 | Status: SHIPPED | OUTPATIENT
Start: 2020-08-17 | End: 2021-03-02 | Stop reason: SDUPTHER

## 2020-09-03 ENCOUNTER — OFFICE VISIT (OUTPATIENT)
Dept: FAMILY MEDICINE CLINIC | Facility: CLINIC | Age: 75
End: 2020-09-03

## 2020-09-03 ENCOUNTER — TELEPHONE (OUTPATIENT)
Dept: FAMILY MEDICINE CLINIC | Facility: CLINIC | Age: 75
End: 2020-09-03

## 2020-09-03 VITALS
BODY MASS INDEX: 25.21 KG/M2 | HEIGHT: 62 IN | OXYGEN SATURATION: 97 % | SYSTOLIC BLOOD PRESSURE: 160 MMHG | WEIGHT: 137 LBS | HEART RATE: 85 BPM | TEMPERATURE: 97.1 F | DIASTOLIC BLOOD PRESSURE: 80 MMHG

## 2020-09-03 DIAGNOSIS — E11.9 TYPE 2 DIABETES MELLITUS WITHOUT COMPLICATION, WITHOUT LONG-TERM CURRENT USE OF INSULIN (HCC): ICD-10-CM

## 2020-09-03 DIAGNOSIS — I10 ESSENTIAL HYPERTENSION: ICD-10-CM

## 2020-09-03 DIAGNOSIS — M54.42 CHRONIC BILATERAL LOW BACK PAIN WITH BILATERAL SCIATICA: ICD-10-CM

## 2020-09-03 DIAGNOSIS — G25.81 RLS (RESTLESS LEGS SYNDROME): ICD-10-CM

## 2020-09-03 DIAGNOSIS — E55.9 VITAMIN D DEFICIENCY: ICD-10-CM

## 2020-09-03 DIAGNOSIS — E03.9 ACQUIRED HYPOTHYROIDISM: ICD-10-CM

## 2020-09-03 DIAGNOSIS — K21.00 GASTROESOPHAGEAL REFLUX DISEASE WITH ESOPHAGITIS: ICD-10-CM

## 2020-09-03 DIAGNOSIS — G89.29 CHRONIC BILATERAL LOW BACK PAIN WITH BILATERAL SCIATICA: ICD-10-CM

## 2020-09-03 DIAGNOSIS — E78.2 MIXED HYPERLIPIDEMIA: ICD-10-CM

## 2020-09-03 DIAGNOSIS — K59.03 DRUG-INDUCED CONSTIPATION: ICD-10-CM

## 2020-09-03 DIAGNOSIS — J45.42 MODERATE PERSISTENT ASTHMA WITH STATUS ASTHMATICUS: ICD-10-CM

## 2020-09-03 DIAGNOSIS — M50.30 DDD (DEGENERATIVE DISC DISEASE), CERVICAL: ICD-10-CM

## 2020-09-03 DIAGNOSIS — M54.41 CHRONIC BILATERAL LOW BACK PAIN WITH BILATERAL SCIATICA: ICD-10-CM

## 2020-09-03 DIAGNOSIS — Z23 ENCOUNTER FOR VACCINATION: Primary | ICD-10-CM

## 2020-09-03 PROCEDURE — 96160 PT-FOCUSED HLTH RISK ASSMT: CPT | Performed by: NURSE PRACTITIONER

## 2020-09-03 PROCEDURE — G0008 ADMIN INFLUENZA VIRUS VAC: HCPCS | Performed by: NURSE PRACTITIONER

## 2020-09-03 PROCEDURE — G0439 PPPS, SUBSEQ VISIT: HCPCS | Performed by: NURSE PRACTITIONER

## 2020-09-03 PROCEDURE — 90686 IIV4 VACC NO PRSV 0.5 ML IM: CPT | Performed by: NURSE PRACTITIONER

## 2020-09-03 RX ORDER — LIFITEGRAST 50 MG/ML
SOLUTION/ DROPS OPHTHALMIC
Status: ON HOLD | COMMUNITY
Start: 2020-07-02 | End: 2022-04-25

## 2020-09-03 RX ORDER — DOCUSATE SODIUM 100 MG/1
100 CAPSULE, LIQUID FILLED ORAL 2 TIMES DAILY
Qty: 180 CAPSULE | Refills: 4 | Status: SHIPPED | OUTPATIENT
Start: 2020-09-03 | End: 2021-03-02 | Stop reason: SDUPTHER

## 2020-09-03 RX ORDER — DOCUSATE SODIUM 100 MG/1
100 CAPSULE, LIQUID FILLED ORAL 2 TIMES DAILY
Qty: 180 CAPSULE | Refills: 4 | Status: SHIPPED | OUTPATIENT
Start: 2020-09-03 | End: 2020-09-03 | Stop reason: SDUPTHER

## 2020-09-03 RX ORDER — CYCLOBENZAPRINE HCL 10 MG
10 TABLET ORAL 3 TIMES DAILY PRN
Qty: 180 TABLET | Refills: 3 | Status: SHIPPED | OUTPATIENT
Start: 2020-09-03 | End: 2021-03-02 | Stop reason: SDUPTHER

## 2020-09-03 RX ORDER — TRIAMCINOLONE ACETONIDE 1 MG/G
CREAM TOPICAL 3 TIMES DAILY
Qty: 80 G | Refills: 3 | Status: SHIPPED | OUTPATIENT
Start: 2020-09-03 | End: 2021-03-02 | Stop reason: SDUPTHER

## 2020-09-03 NOTE — TELEPHONE ENCOUNTER
----- Message from Marvin Milan sent at 9/3/2020 11:37 AM EDT -----  meds was sent in to [facility] needs resent to [facility]

## 2020-09-03 NOTE — PROGRESS NOTES
The ABCs of the Annual Wellness Visit  Subsequent Medicare Wellness Visit    No chief complaint on file.  Chief complaint  Medicare wellness exam  Constipation  Medication refills      Subjective   History of Present Illness:  Tyler Cabello is a 75 y.o. female who presents for a Subsequent Medicare Wellness Visit.      Chronic pain - under care of neurology and pain clinic. Considering a stimulator.  Has lumbar and cervical radiculopathy.  Multiple imaging results showing degenerative disc and bulging disc.    constipation- has daily constipation.  Takes Linzess and stool softeners    GERD-controlled with diet and PPI    Asthma-stable with avoidance, allergy medications and asthma precautions    Depression-stable with Prozac    Hypothyroidism-stable with Synthroid    Hypertension-stable with low-salt diet and lifestyle changes    RLS stable with Requip    DM 2 -patient is watching her diet closely and avoiding concentrated sweets and carbs.  Checks her glucose frequently and has maintained a good average    Hyperlipidemia-patient currently receives Crestor and is eating a heart healthy diet        HEALTH RISK ASSESSMENT    Recent Hospitalizations:  No hospitalization(s) within the last year.    Current Medical Providers:  Patient Care Team:  Miranda Bo APRN as PCP - General  Miranda Bo APRN as PCP - Family Medicine    Smoking Status:  Social History     Tobacco Use   Smoking Status Former Smoker   • Packs/day: 0.50   • Years: 25.00   • Pack years: 12.50   • Types: Cigarettes   Smokeless Tobacco Former User   • Quit date: 7/6/2008   Tobacco Comment    25-30 YEARS       Alcohol Consumption:  Social History     Substance and Sexual Activity   Alcohol Use No       Depression Screen:   PHQ-2/PHQ-9 Depression Screening 9/3/2020   Little interest or pleasure in doing things 0   Feeling down, depressed, or hopeless 1   Trouble falling or staying asleep, or sleeping too much 1   Feeling tired  or having little energy 1   Poor appetite or overeating 1   Feeling bad about yourself - or that you are a failure or have let yourself or your family down 0   Trouble concentrating on things, such as reading the newspaper or watching television 0   Moving or speaking so slowly that other people could have noticed. Or the opposite - being so fidgety or restless that you have been moving around a lot more than usual 0   Thoughts that you would be better off dead, or of hurting yourself in some way 0   Total Score 4   If you checked off any problems, how difficult have these problems made it for you to do your work, take care of things at home, or get along with other people? Not difficult at all       Fall Risk Screen:  GLORIA Fall Risk Assessment was completed, and patient is at LOW risk for falls.Assessment completed on:9/3/2020    Health Habits and Functional and Cognitive Screening:  Functional & Cognitive Status 9/3/2020   Do you have difficulty preparing food and eating? No   Do you have difficulty bathing yourself, getting dressed or grooming yourself? No   Do you have difficulty using the toilet? No   Do you have difficulty moving around from place to place? Yes   Do you have trouble with steps or getting out of a bed or a chair? Yes   Current Diet Well Balanced Diet   Dental Exam Not up to date   Eye Exam Up to date   Exercise (times per week) 7 times per week   Current Exercise Activities Include Housecleaning   Do you need help using the phone?  No   Are you deaf or do you have serious difficulty hearing?  No   Do you need help with transportation? No   Do you need help shopping? No   Do you need help preparing meals?  No   Do you need help with housework?  No   Do you need help with laundry? No   Do you need help taking your medications? No   Do you need help managing money? No   Do you ever drive or ride in a car without wearing a seat belt? No   Have you felt unusual stress, anger or loneliness in the  last month? Yes   Who do you live with? Spouse   If you need help, do you have trouble finding someone available to you? Yes   Have you been bothered in the last four weeks by sexual problems? No   Do you have difficulty concentrating, remembering or making decisions? No         Does the patient have evidence of cognitive impairment? No    Asprin use counseling:Does not need ASA (and currently is not on it)    Age-appropriate Screening Schedule:  Refer to the list below for future screening recommendations based on patient's age, sex and/or medical conditions. Orders for these recommended tests are listed in the plan section. The patient has been provided with a written plan.    Health Maintenance   Topic Date Due   • ZOSTER VACCINE (2 of 2) 06/27/2018   • INFLUENZA VACCINE  08/01/2020   • HEMOGLOBIN A1C  12/09/2020   • DIABETIC EYE EXAM  01/08/2021   • LIPID PANEL  06/09/2021   • URINE MICROALBUMIN  06/09/2021   • MAMMOGRAM  08/30/2021   • DIABETIC FOOT EXAM  09/03/2021   • COLONOSCOPY  11/07/2023   • TDAP/TD VACCINES (3 - Td) 05/02/2028          The following portions of the patient's history were reviewed and updated as appropriate: allergies, current medications, past family history, past medical history, past social history, past surgical history and problem list.    Outpatient Medications Prior to Visit   Medication Sig Dispense Refill   • baclofen (LIORESAL) 10 MG tablet Take 1 tablet by mouth 3 (Three) Times a Day As Needed for Muscle Spasms. 90 tablet 3   • cetirizine (zyrTEC) 10 MG tablet Take 1 tablet by mouth 2 (Two) Times a Day. 180 tablet 3   • diclofenac (VOLTAREN) 1 % gel gel Apply 4 g topically to the appropriate area as directed 4 (Four) Times a Day As Needed (pain). 4 tube 3   • FLUoxetine (PROzac) 20 MG capsule Take 1 capsule by mouth Daily. 30 capsule 5   • furosemide (LASIX) 20 MG tablet Take 1 tablet by mouth Daily As Needed (swelling). 90 tablet 3   • gabapentin (NEURONTIN) 100 MG capsule 1  tablet twice daily 60 capsule 2   • HYDROcodone-acetaminophen (NORCO) 7.5-325 MG per tablet Take 1 tablet by mouth Every 6 (Six) Hours As Needed for Moderate Pain . 60 tablet 0   • ibuprofen (ADVIL,MOTRIN) 800 MG tablet Take 1 tablet by mouth Every 8 (Eight) Hours As Needed for Mild Pain . 90 tablet 3   • levothyroxine (Synthroid) 50 MCG tablet Take 1 tablet by mouth Daily. 90 tablet 3   • linaclotide (LINZESS) 290 MCG capsule capsule Take 1 capsule by mouth Every Morning Before Breakfast. 90 capsule 3   • montelukast (Singulair) 10 MG tablet Take 1 tablet by mouth Every Night. 90 tablet 3   • pantoprazole (PROTONIX) 40 MG EC tablet Take 1 tablet by mouth Daily. 90 tablet 3   • Plecanatide (Trulance) 3 MG tablet Take 1 tablet by mouth Daily. 7 tablet 0   • potassium chloride ER (K-TAB) 20 MEQ tablet controlled-release ER tablet Take 1 tablet by mouth Daily As Needed (when take lasix). 90 tablet 3   • rOPINIRole (REQUIP) 2 MG tablet Take 1 tablet by mouth Every Night. Take 1 hour before bedtime. 90 tablet 3   • rosuvastatin (CRESTOR) 5 MG tablet Take 1 tablet by mouth Every Night. 90 tablet 3   • traMADol ER (ULTRAM-ER) 100 MG 24 hr tablet Take 2 tablets by mouth Daily. 60 tablet 2   • verapamil SR (CALAN-SR) 240 MG CR tablet Take 1 tablet by mouth Every Night. 90 tablet 3   • XIIDRA 5 % ophthalmic solution INSTILL 1 DROP INTO EACH EYE BID     • triamcinolone (KENALOG) 0.1 % cream APPLY EXTERNALLY TO THE AFFECTED AREA TWICE DAILY 80 g 0     No facility-administered medications prior to visit.        Patient Active Problem List   Diagnosis   • Gastroesophageal reflux disease with esophagitis   • Moderate persistent asthma with status asthmaticus   • Constipation   • Mixed hyperlipidemia   • Essential hypertension   • RLS (restless legs syndrome)   • Type 2 diabetes mellitus without complication (CMS/HCC)   • Dry scalp   • Acquired hypothyroidism   • Chronic pain of right knee   • Chronic pain of both knees   •  Encounter for immunization   • DDD (degenerative disc disease), cervical   • Reactive depression   • Dyspareunia, female   • Mild episode of recurrent major depressive disorder (CMS/HCC)   • Chronic bilateral low back pain with bilateral sciatica   • Lumbar radiculopathy   • Cervical radiculitis      Visual Acuity Screening    Right eye Left eye Both eyes   Without correction: 20/50 20/70 20/40   With correction:        Hearing is adequate per whisper test bilateral.     Advanced Care Planning:  ACP discussion was held with the patient during this visit. Patient does not have an advance directive, declines further assistance.    Review of Systems   Constitutional: Positive for fatigue. Negative for activity change, appetite change, chills, fever and unexpected weight change.   HENT: Negative for congestion, sinus pressure, sinus pain, sneezing and sore throat.    Eyes: Negative for pain, discharge and itching.   Respiratory: Negative for cough, chest tightness and shortness of breath.    Cardiovascular: Negative.    Gastrointestinal: Positive for constipation. Negative for abdominal pain, diarrhea, nausea and vomiting.   Endocrine: Negative.    Genitourinary: Negative for difficulty urinating, dysuria, flank pain, frequency and hematuria.   Musculoskeletal: Positive for arthralgias, back pain, myalgias and neck pain. Negative for neck stiffness.   Allergic/Immunologic: Positive for environmental allergies, food allergies and immunocompromised state.   Hematological: Negative.    Psychiatric/Behavioral: Negative for dysphoric mood, self-injury and suicidal ideas. The patient is not nervous/anxious.        Compared to one year ago, the patient feels her physical health is the same.  Compared to one year ago, the patient feels her mental health is the same.    Reviewed chart for potential of high risk medication in the elderly: yes  Reviewed chart for potential of harmful drug interactions in the  "elderly:yes    Objective         Vitals:    09/03/20 1101   BP: 160/80   Pulse: 85   Temp: 97.1 °F (36.2 °C)   TempSrc: Temporal   SpO2: 97%   Weight: 62.1 kg (137 lb)   Height: 157.5 cm (62.01\")       Body mass index is 25.05 kg/m².  Discussed the patient's BMI with her. The BMI is in the acceptable range for her body type.    Physical Exam   Constitutional: She is oriented to person, place, and time. Vital signs are normal. She appears well-developed and well-nourished. She is cooperative.  Non-toxic appearance. She does not have a sickly appearance. She does not appear ill. No distress.   HENT:   Head: Normocephalic. Hair is normal.   Right Ear: External ear normal.   Left Ear: External ear normal.   Nose: Nose normal.   Eyes: Pupils are equal, round, and reactive to light. Conjunctivae, EOM and lids are normal. Right eye exhibits no discharge. Left eye exhibits no discharge.   Neck: Normal range of motion. Neck supple. No tracheal deviation present. No thyromegaly present.   Cardiovascular: Normal rate, regular rhythm and normal heart sounds. Exam reveals no gallop and no friction rub.   No murmur heard.  Pulmonary/Chest: Effort normal and breath sounds normal. No respiratory distress. She has no wheezes. She has no rales. She exhibits no tenderness.   Abdominal: Soft. Normal appearance and bowel sounds are normal. She exhibits no distension and no mass. There is no tenderness. There is no rebound and no guarding.   Musculoskeletal: Normal range of motion.        Cervical back: She exhibits tenderness.        Lumbar back: She exhibits tenderness.   General stiffness in neck and back, hips and knees.   Lymphadenopathy:     She has no cervical adenopathy.   Neurological: She is alert and oriented to person, place, and time. She has normal reflexes. She is not disoriented.   CN 2-12 grossly intact    Skin: Skin is warm and dry. Capillary refill takes less than 2 seconds. No rash noted. She is not diaphoretic. No " erythema.   Psychiatric: She has a normal mood and affect. Her speech is normal and behavior is normal. Judgment and thought content normal. Her affect is not inappropriate. Cognition and memory are normal. She is attentive.   Vitals reviewed.      Lab Results   Component Value Date    TRIG 82 06/09/2020    HDL 64 (H) 06/09/2020    LDL 75 06/09/2020    VLDL 16.4 06/09/2020    HGBA1C 6.50 (H) 06/09/2020        Assessment/Plan   Medicare Risks and Personalized Health Plan  CMS Preventative Services Quick Reference  Advance Directive Discussion  Breast Cancer/Mammogram Screening  Cardiovascular risk  Chronic Pain   Depression/Dysphoria  Fall Risk  Immunizations Discussed/Encouraged (specific immunizations; Influenza and Shingrix )  Osteoprorosis Risk  Polypharmacy    The above risks/problems have been discussed with the patient.  Pertinent information has been shared with the patient in the After Visit Summary.  Follow up plans and orders are seen below in the Assessment/Plan Section.    Diagnoses and all orders for this visit:    1. Encounter for vaccination (Primary)  -     Fluarix Quad >6 Months ([id number])    2. Type 2 diabetes mellitus without complication, without long-term current use of insulin (CMS/[facility])  -     CBC & Differential; Future  -     Comprehensive Metabolic Panel; Future  -     TSH; Future  -     Hemoglobin A1c; Future  -     Vitamin D 25 Hydroxy; Future  -     Vitamin B12; Future  -     Lipid Panel; Future  -     Uric Acid; Future  -     MicroAlbumin, Urine, Random - Urine, Clean Catch; Future    3. RLS (restless legs syndrome)  -     CBC & Differential; Future  -     Comprehensive Metabolic Panel; Future  -     TSH; Future  -     Hemoglobin A1c; Future  -     Vitamin D 25 Hydroxy; Future  -     Vitamin B12; Future  -     Lipid Panel; Future  -     Uric Acid; Future  -     MicroAlbumin, Urine, Random - Urine, Clean Catch; Future    4. Essential hypertension  -     CBC & Differential; Future  -      Comprehensive Metabolic Panel; Future  -     TSH; Future  -     Hemoglobin A1c; Future  -     Vitamin D 25 Hydroxy; Future  -     Vitamin B12; Future  -     Lipid Panel; Future  -     Uric Acid; Future  -     MicroAlbumin, Urine, Random - Urine, Clean Catch; Future    5. Mixed hyperlipidemia  -     CBC & Differential; Future  -     Comprehensive Metabolic Panel; Future  -     TSH; Future  -     Hemoglobin A1c; Future  -     Vitamin D 25 Hydroxy; Future  -     Vitamin B12; Future  -     Lipid Panel; Future  -     Uric Acid; Future  -     MicroAlbumin, Urine, Random - Urine, Clean Catch; Future    6. Drug-induced constipation  -     CBC & Differential; Future  -     Comprehensive Metabolic Panel; Future  -     TSH; Future  -     Hemoglobin A1c; Future  -     Vitamin D 25 Hydroxy; Future  -     Vitamin B12; Future  -     Lipid Panel; Future  -     Uric Acid; Future  -     MicroAlbumin, Urine, Random - Urine, Clean Catch; Future    7. Moderate persistent asthma with status asthmaticus  -     CBC & Differential; Future  -     Comprehensive Metabolic Panel; Future  -     TSH; Future  -     Hemoglobin A1c; Future  -     Vitamin D 25 Hydroxy; Future  -     Vitamin B12; Future  -     Lipid Panel; Future  -     Uric Acid; Future  -     MicroAlbumin, Urine, Random - Urine, Clean Catch; Future    8. Gastroesophageal reflux disease with esophagitis  -     CBC & Differential; Future  -     Comprehensive Metabolic Panel; Future  -     TSH; Future  -     Hemoglobin A1c; Future  -     Vitamin D 25 Hydroxy; Future  -     Vitamin B12; Future  -     Lipid Panel; Future  -     Uric Acid; Future  -     MicroAlbumin, Urine, Random - Urine, Clean Catch; Future    9. Chronic bilateral low back pain with bilateral sciatica  -     CBC & Differential; Future  -     Comprehensive Metabolic Panel; Future  -     TSH; Future  -     Hemoglobin A1c; Future  -     Vitamin D 25 Hydroxy; Future  -     Vitamin B12; Future  -     Lipid Panel; Future  -      Uric Acid; Future  -     MicroAlbumin, Urine, Random - Urine, Clean Catch; Future    10. DDD (degenerative disc disease), cervical  -     CBC & Differential; Future  -     Comprehensive Metabolic Panel; Future  -     TSH; Future  -     Hemoglobin A1c; Future  -     Vitamin D 25 Hydroxy; Future  -     Vitamin B12; Future  -     Lipid Panel; Future  -     Uric Acid; Future  -     MicroAlbumin, Urine, Random - Urine, Clean Catch; Future    11. Acquired hypothyroidism  -     CBC & Differential; Future  -     Comprehensive Metabolic Panel; Future  -     TSH; Future  -     Hemoglobin A1c; Future  -     Vitamin D 25 Hydroxy; Future  -     Vitamin B12; Future  -     Lipid Panel; Future  -     Uric Acid; Future  -     MicroAlbumin, Urine, Random - Urine, Clean Catch; Future    12. Vitamin D deficiency  -     Vitamin D 25 Hydroxy; Future    Other orders  -     cyclobenzaprine (FLEXERIL) 10 MG tablet; Take 1 tablet by mouth 3 (Three) Times a Day As Needed for Muscle Spasms.  Dispense: 180 tablet; Refill: 3  -     triamcinolone (KENALOG) 0.1 % cream; Apply  topically to the appropriate area as directed 3 (Three) Times a Day. to affected area  Dispense: 80 g; Refill: 3  -     Zoster Vac Recomb Adjuvanted 50 MCG/0.5ML reconstituted suspension; Inject 0.5 mL into the appropriate muscle as directed by prescriber 1 (One) Time for 1 dose.  Dispense: 1 each; Refill: 1  -     Discontinue: docusate sodium (COLACE) 100 MG capsule; Take 1 capsule by mouth 2 (Two) Times a Day.  Dispense: 180 capsule; Refill: 4  -     docusate sodium (COLACE) 100 MG capsule; Take 1 capsule by mouth 2 (Two) Times a Day.  Dispense: 180 capsule; Refill: 4      Current Outpatient Medications:   •  baclofen (LIORESAL) 10 MG tablet, Take 1 tablet by mouth 3 (Three) Times a Day As Needed for Muscle Spasms., Disp: 90 tablet, Rfl: 3  •  cetirizine (zyrTEC) 10 MG tablet, Take 1 tablet by mouth 2 (Two) Times a Day., Disp: 180 tablet, Rfl: 3  •  diclofenac (VOLTAREN)  1 % gel gel, Apply 4 g topically to the appropriate area as directed 4 (Four) Times a Day As Needed (pain)., Disp: 4 tube, Rfl: 3  •  FLUoxetine (PROzac) 20 MG capsule, Take 1 capsule by mouth Daily., Disp: 30 capsule, Rfl: 5  •  furosemide (LASIX) 20 MG tablet, Take 1 tablet by mouth Daily As Needed (swelling)., Disp: 90 tablet, Rfl: 3  •  gabapentin (NEURONTIN) 100 MG capsule, 1 tablet twice daily, Disp: 60 capsule, Rfl: 2  •  HYDROcodone-acetaminophen (NORCO) 7.5-325 MG per tablet, Take 1 tablet by mouth Every 6 (Six) Hours As Needed for Moderate Pain ., Disp: 60 tablet, Rfl: 0  •  ibuprofen (ADVIL,MOTRIN) 800 MG tablet, Take 1 tablet by mouth Every 8 (Eight) Hours As Needed for Mild Pain ., Disp: 90 tablet, Rfl: 3  •  levothyroxine (Synthroid) 50 MCG tablet, Take 1 tablet by mouth Daily., Disp: 90 tablet, Rfl: 3  •  linaclotide (LINZESS) 290 MCG capsule capsule, Take 1 capsule by mouth Every Morning Before Breakfast., Disp: 90 capsule, Rfl: 3  •  montelukast (Singulair) 10 MG tablet, Take 1 tablet by mouth Every Night., Disp: 90 tablet, Rfl: 3  •  pantoprazole (PROTONIX) 40 MG EC tablet, Take 1 tablet by mouth Daily., Disp: 90 tablet, Rfl: 3  •  Plecanatide (Trulance) 3 MG tablet, Take 1 tablet by mouth Daily., Disp: 7 tablet, Rfl: 0  •  potassium chloride ER (K-TAB) 20 MEQ tablet controlled-release ER tablet, Take 1 tablet by mouth Daily As Needed (when take lasix)., Disp: 90 tablet, Rfl: 3  •  rOPINIRole (REQUIP) 2 MG tablet, Take 1 tablet by mouth Every Night. Take 1 hour before bedtime., Disp: 90 tablet, Rfl: 3  •  rosuvastatin (CRESTOR) 5 MG tablet, Take 1 tablet by mouth Every Night., Disp: 90 tablet, Rfl: 3  •  traMADol ER (ULTRAM-ER) 100 MG 24 hr tablet, Take 2 tablets by mouth Daily., Disp: 60 tablet, Rfl: 2  •  triamcinolone (KENALOG) 0.1 % cream, Apply  topically to the appropriate area as directed 3 (Three) Times a Day. to affected area, Disp: 80 g, Rfl: 3  •  verapamil SR (CALAN-SR) 240 MG CR  tablet, Take 1 tablet by mouth Every Night., Disp: 90 tablet, Rfl: 3  •  XIIDRA 5 % ophthalmic solution, INSTILL 1 DROP INTO EACH EYE BID, Disp: , Rfl:   •  cyclobenzaprine (FLEXERIL) 10 MG tablet, Take 1 tablet by mouth 3 (Three) Times a Day As Needed for Muscle Spasms., Disp: 180 tablet, Rfl: 3  •  docusate sodium (COLACE) 100 MG capsule, Take 1 capsule by mouth 2 (Two) Times a Day., Disp: 180 capsule, Rfl: 4  •  Zoster Vac Recomb Adjuvanted 50 MCG/0.5ML reconstituted suspension, Inject 0.5 mL into the appropriate muscle as directed by prescriber 1 (One) Time for 1 dose., Disp: 1 each, Rfl: 1      Medicare wellness exam has been performed today.  Medication list has been reviewed and discussed with patient.  Recommended preventative and screenings has been discussed with patient.      Emotional support and active listening provided.   I have discussed diagnosis in detail today allowing time for questions and answers. Pt is aware of reasons to seek urgent or emergent medical care as well as reasons to return to the clinic for evaluation. Possible side effects, interactions and progression of symptoms discussed as well. Pt / family states understanding.     Age appropriate education and safety instruction has been provided. Preventive education/recommendation > 15 minutes. Safety, accident prevention, vaccines, health maintenance concerns, nutrition, diet, exercise and social activity.     After obtaining informed consent, the immunization is given by staff    Pt has been educated/instructed on diabetic care and protocols.  Diabetic diet instructions provided.  Medication regimen reviewed.  Discussed possible side effects and interactions of current medications.  Sick day rules reviewed. Continue to monitor blood sugar and report abnormal readings as discussed today. Understanding stated by patient.       Pt has been instructed today regarding low fat heart smart diet. Weight management and routine exercise has been  recommended. Avoid high fat foods, starchy foods and processed foods. Increase lean meats, fresh vegetables and fresh fruits.     Coronavirus precautions have been reviewed and discussed.  I have discussed the CDC recommendations  of social distancing, hand washing, wearing mask and disinfecting commonly touched items. Reviewed need to notify PCP and self quarantine with mild symptoms.  Discussed procedure to obtain Covid-19 testing and notification of PCP/health dept/ED/Urgent Center if symptoms begin. Understanding verbalized.    [place] list reviewed and discussed.  Refill routine medications.  Remain under the care of pain management for chronic pain.  Did discuss her medications and reviewed possible side effects/interactions.  Encouraged patient to avoid taking any controlled or sedating medication within a 4-hour window of each other.  States understanding.        Follow Up:  Return in about 6 months (around 3/3/2021) for Annual physical, labs one week prior.     An After Visit Summary and PPPS were given to the patient.

## 2020-10-05 ENCOUNTER — HOSPITAL ENCOUNTER (OUTPATIENT)
Dept: MAMMOGRAPHY | Facility: HOSPITAL | Age: 75
Discharge: HOME | End: 2020-10-05
Admitting: NURSE PRACTITIONER

## 2020-10-05 DIAGNOSIS — Z12.31 VISIT FOR SCREENING MAMMOGRAM: ICD-10-CM

## 2020-10-05 PROCEDURE — 77063 BREAST TOMOSYNTHESIS BI: CPT

## 2020-10-05 PROCEDURE — 77067 SCR MAMMO BI INCL CAD: CPT

## 2020-10-05 PROCEDURE — 77067 SCR MAMMO BI INCL CAD: CPT | Performed by: RADIOLOGY

## 2020-10-05 PROCEDURE — 77063 BREAST TOMOSYNTHESIS BI: CPT | Performed by: RADIOLOGY

## 2020-11-03 ENCOUNTER — OFFICE VISIT (OUTPATIENT)
Dept: FAMILY MEDICINE CLINIC | Facility: CLINIC | Age: 75
End: 2020-11-03

## 2020-11-03 VITALS
TEMPERATURE: 99.2 F | SYSTOLIC BLOOD PRESSURE: 149 MMHG | HEIGHT: 62 IN | WEIGHT: 137 LBS | DIASTOLIC BLOOD PRESSURE: 90 MMHG | BODY MASS INDEX: 25.21 KG/M2

## 2020-11-03 DIAGNOSIS — H92.02 EARACHE ON LEFT: Primary | ICD-10-CM

## 2020-11-03 PROCEDURE — G2025 DIS SITE TELE SVCS RHC/FQHC: HCPCS | Performed by: NURSE PRACTITIONER

## 2020-11-03 PROCEDURE — 99441 PR PHYS/QHP TELEPHONE EVALUATION 5-10 MIN: CPT | Performed by: NURSE PRACTITIONER

## 2020-11-03 RX ORDER — FLUTICASONE PROPIONATE 50 MCG
SPRAY, SUSPENSION (ML) NASAL
Qty: 1 BOTTLE | Refills: 5 | Status: SHIPPED | OUTPATIENT
Start: 2020-11-03 | End: 2021-03-02 | Stop reason: SDUPTHER

## 2020-11-03 RX ORDER — AMOXICILLIN 500 MG/1
500 CAPSULE ORAL 3 TIMES DAILY
Qty: 30 CAPSULE | Refills: 0 | Status: SHIPPED | OUTPATIENT
Start: 2020-11-03 | End: 2021-01-08

## 2020-11-03 RX ORDER — PSEUDOEPHEDRINE HYDROCHLORIDE 60 MG/1
60 TABLET, FILM COATED ORAL EVERY 4 HOURS PRN
Qty: 30 TABLET | Refills: 1 | Status: SHIPPED | OUTPATIENT
Start: 2020-11-03 | End: 2021-03-02 | Stop reason: SDUPTHER

## 2020-11-03 NOTE — PROGRESS NOTES
Establish patient called office of APRN today to discuss:   CC    Earache for 3 days    You have chosen to receive care through a telephone visit today. Do you consent to use a telephone visit for your medical care today? Yes     Brief HPI/ROS obtained as follows:    Left earache for 3 days. Pressure and pain.  Has some Ciprodex drops at home that she has been using.  This is not helping much.  Moderate intensity.  Some sinus pressure.  No fever that she has noticed.  Some chills.  No change in taste or smell.  No cough.    The following portions of the patient's history, chief complaint and ROS were reviewed and updated as appropriate per provider:  Allergies, current medications, past family history, past medical history, past social history, past surgical history and problem list.    Review of Systems   Constitutional: Positive for activity change and fatigue. Negative for appetite change and fever.   HENT: Positive for ear pain, sinus pressure and sinus pain. Negative for congestion, sneezing, sore throat and trouble swallowing.    Eyes: Negative for pain, discharge and itching.   Respiratory: Negative for cough, chest tightness and shortness of breath.    Cardiovascular: Negative.    Gastrointestinal: Negative for abdominal pain, nausea and vomiting.   Endocrine: Negative.    Genitourinary: Negative for difficulty urinating, dysuria and flank pain.   Musculoskeletal: Positive for arthralgias, back pain and neck pain. Negative for neck stiffness.   Skin: Negative.    Allergic/Immunologic: Positive for environmental allergies. Negative for food allergies and immunocompromised state.   Neurological: Negative for dizziness, tremors, speech difficulty and light-headedness.   Hematological: Negative.    Psychiatric/Behavioral: Negative for dysphoric mood, self-injury and suicidal ideas.       The current allergy list and medication list was reviewed with patient for accuracy.     Assessment     Alert and oriented x3.   Respirations not labored with conversation.  No cough.  Speech normal.  Hearing adequate.  Cooperative.  Mood normal.  Thought process normal.    Diagnoses and all orders for this visit:    1. Earache on left (Primary)  Comments:  Symptomatic treatment, Tylenol/ibuprofen for pain.  Amoxicillin, Sudafed and Flonase as prescribed.  Avoid water in ears.    Other orders  -     amoxicillin (AMOXIL) 500 MG capsule; Take 1 capsule by mouth 3 (Three) Times a Day.  Dispense: 30 capsule; Refill: 0  -     pseudoephedrine (SUDAFED) 60 MG tablet; Take 1 tablet by mouth Every 4 (Four) Hours As Needed for Congestion.  Dispense: 30 tablet; Refill: 1  -     fluticasone (Flonase) 50 MCG/ACT nasal spray; Administer 2 sprays both nostrils once daily  Dispense: 1 bottle; Refill: 5      Fluids, rest, symptomatic treatment advised. Steam therapy. Dispose of tooth bush after 24 hours of starting antibiotics if ordered. Complete antibiotics as ordered.  Discussed possible side effects/interactions of medication. Discussed symptoms to report as well as reasons to seek urgent or emergent medical attention. Understanding stated.   Recommend follow up in 2-3 days if not improved, sooner if needed.       Coronavirus precautions have been reviewed and discussed.  I have discussed the CDC recommendations  of social distancing, hand washing and disinfecting commonly touched items. Reviewed need to notify PCP and self quarantine with mild symptoms.  Discussed procedure to obtain Covid-19 testing and notification of PCP/health dept/ED/Urgent Center if symptoms begin. Understanding verbalized.       Patient instructed and advised to call if symptoms are increasing or new symptoms occur.    Understands reasons for urgent and emergent care.  Patient (& family) verbalized agreement for treatment plan.     Reviewed disease process, possible complications, reasons for urgent care and possible side effects of medications. Pt/family state understanding.      Follow-up in 2--5 days if not improved, sooner if needed.      This visit has been rescheduled as a phone visit to comply with patient safety concerns in accordance with CDC recommendations. Total time of discussion was 7 minutes.

## 2020-12-11 NOTE — ASSESSMENT & PLAN NOTE
Diabetes is improving with lifestyle modifications.   Continue current treatment regimen.  Diabetes will be reassessed in 3 months.   not applicable (Male)

## 2021-01-05 DIAGNOSIS — M25.561 PAIN IN BOTH KNEES, UNSPECIFIED CHRONICITY: Primary | ICD-10-CM

## 2021-01-05 DIAGNOSIS — M25.562 PAIN IN BOTH KNEES, UNSPECIFIED CHRONICITY: Primary | ICD-10-CM

## 2021-01-08 ENCOUNTER — OFFICE VISIT (OUTPATIENT)
Dept: ORTHOPEDIC SURGERY | Facility: CLINIC | Age: 76
End: 2021-01-08

## 2021-01-08 VITALS — TEMPERATURE: 97.1 F | WEIGHT: 137 LBS | HEIGHT: 62 IN | BODY MASS INDEX: 25.21 KG/M2

## 2021-01-08 DIAGNOSIS — M25.562 PAIN IN BOTH KNEES, UNSPECIFIED CHRONICITY: ICD-10-CM

## 2021-01-08 DIAGNOSIS — M25.561 PAIN IN BOTH KNEES, UNSPECIFIED CHRONICITY: ICD-10-CM

## 2021-01-08 DIAGNOSIS — M17.0 PRIMARY OSTEOARTHRITIS OF KNEES, BILATERAL: Primary | ICD-10-CM

## 2021-01-08 PROCEDURE — 20610 DRAIN/INJ JOINT/BURSA W/O US: CPT | Performed by: ORTHOPAEDIC SURGERY

## 2021-01-08 RX ADMIN — METHYLPREDNISOLONE ACETATE 80 MG: 80 INJECTION, SUSPENSION INTRA-ARTICULAR; INTRALESIONAL; INTRAMUSCULAR; SOFT TISSUE at 10:58

## 2021-01-08 RX ADMIN — LIDOCAINE HYDROCHLORIDE 5 ML: 20 INJECTION, SOLUTION INFILTRATION; PERINEURAL at 10:54

## 2021-01-08 RX ADMIN — LIDOCAINE HYDROCHLORIDE 5 ML: 20 INJECTION, SOLUTION INFILTRATION; PERINEURAL at 10:58

## 2021-01-08 RX ADMIN — METHYLPREDNISOLONE ACETATE 80 MG: 80 INJECTION, SUSPENSION INTRA-ARTICULAR; INTRALESIONAL; INTRAMUSCULAR; SOFT TISSUE at 10:54

## 2021-01-09 PROBLEM — M17.0 PRIMARY OSTEOARTHRITIS OF KNEES, BILATERAL: Status: ACTIVE | Noted: 2021-01-09

## 2021-01-11 RX ORDER — LIDOCAINE HYDROCHLORIDE 20 MG/ML
5 INJECTION, SOLUTION INFILTRATION; PERINEURAL
Status: COMPLETED | OUTPATIENT
Start: 2021-01-08 | End: 2021-01-08

## 2021-01-11 RX ORDER — METHYLPREDNISOLONE ACETATE 80 MG/ML
80 INJECTION, SUSPENSION INTRA-ARTICULAR; INTRALESIONAL; INTRAMUSCULAR; SOFT TISSUE
Status: COMPLETED | OUTPATIENT
Start: 2021-01-08 | End: 2021-01-08

## 2021-01-18 ENCOUNTER — TRANSCRIBE ORDERS (OUTPATIENT)
Dept: ADMINISTRATIVE | Facility: HOSPITAL | Age: 76
End: 2021-01-18

## 2021-01-18 DIAGNOSIS — Z01.818 PRE-OPERATIVE CLEARANCE: Primary | ICD-10-CM

## 2021-01-19 ENCOUNTER — LAB (OUTPATIENT)
Dept: LAB | Facility: HOSPITAL | Age: 76
End: 2021-01-19

## 2021-01-19 DIAGNOSIS — Z01.818 PRE-OPERATIVE CLEARANCE: ICD-10-CM

## 2021-01-19 PROCEDURE — U0004 COV-19 TEST NON-CDC HGH THRU: HCPCS | Performed by: OPHTHALMOLOGY

## 2021-01-20 LAB — SARS-COV-2 RNA RESP QL NAA+PROBE: NOT DETECTED

## 2021-01-28 DIAGNOSIS — M25.512 BILATERAL SHOULDER PAIN, UNSPECIFIED CHRONICITY: Primary | ICD-10-CM

## 2021-01-28 DIAGNOSIS — M25.511 BILATERAL SHOULDER PAIN, UNSPECIFIED CHRONICITY: Primary | ICD-10-CM

## 2021-02-03 ENCOUNTER — HOSPITAL ENCOUNTER (OUTPATIENT)
Dept: GENERAL RADIOLOGY | Facility: HOSPITAL | Age: 76
Discharge: HOME | End: 2021-02-03
Admitting: ORTHOPAEDIC SURGERY

## 2021-02-03 ENCOUNTER — OFFICE VISIT (OUTPATIENT)
Dept: ORTHOPEDIC SURGERY | Facility: CLINIC | Age: 76
End: 2021-02-03

## 2021-02-03 VITALS
DIASTOLIC BLOOD PRESSURE: 78 MMHG | HEART RATE: 78 BPM | SYSTOLIC BLOOD PRESSURE: 176 MMHG | HEIGHT: 66 IN | BODY MASS INDEX: 20.89 KG/M2 | WEIGHT: 130 LBS

## 2021-02-03 DIAGNOSIS — M25.511 BILATERAL SHOULDER PAIN, UNSPECIFIED CHRONICITY: Primary | ICD-10-CM

## 2021-02-03 DIAGNOSIS — M75.82 TENDINITIS OF LEFT ROTATOR CUFF: ICD-10-CM

## 2021-02-03 DIAGNOSIS — M25.512 BILATERAL SHOULDER PAIN, UNSPECIFIED CHRONICITY: Primary | ICD-10-CM

## 2021-02-03 DIAGNOSIS — M25.511 BILATERAL SHOULDER PAIN, UNSPECIFIED CHRONICITY: ICD-10-CM

## 2021-02-03 DIAGNOSIS — M75.81 ROTATOR CUFF TENDINITIS, RIGHT: ICD-10-CM

## 2021-02-03 DIAGNOSIS — M25.512 BILATERAL SHOULDER PAIN, UNSPECIFIED CHRONICITY: ICD-10-CM

## 2021-02-03 PROCEDURE — 73030 X-RAY EXAM OF SHOULDER: CPT | Performed by: RADIOLOGY

## 2021-02-03 PROCEDURE — 73030 X-RAY EXAM OF SHOULDER: CPT

## 2021-02-03 PROCEDURE — 99213 OFFICE O/P EST LOW 20 MIN: CPT | Performed by: ORTHOPAEDIC SURGERY

## 2021-02-03 NOTE — PROGRESS NOTES
Established New Problem         Patient: Tyler Cabello  YOB: 1945  Date of Encounter: 02/03/2021      Chief  Complaint:   Chief Complaint   Patient presents with   • Left Shoulder - Initial Evaluation, Pain, New Problem   • Right Shoulder - Initial Evaluation, Pain, New Problem           HPI:  Tyler Cabello, 75 y.o. female presents with complaints of bilateral shoulder pain and she reports symptoms dating back as far as 1990.  She localizes pain to the posterior aspects of both shoulders with radiation down both arms.  In 2004 she underwent cervical spine surgery and this was repeated in 2012.  She subsequently underwent carpal tunnel release right hand in 2017.  She reports her sensation improved for 2 to 3 months but numbness in her hands has returned.  She is worked in the past in housekeeping.  She reports no trauma to either shoulder currently right shoulder symptoms greater than left.  She reports that her posterior shoulder pain became severe 3 years ago.  She wakes up at night with no feeling in her hands by her description.  She is known to have type 2 diabetes and lumbar radiculopathy with sciatica.  He is also known to have osteoarthritis involving her knees.  She has social history positive for tobacco use in the past.      Medical History:  Patient Active Problem List   Diagnosis   • Gastroesophageal reflux disease with esophagitis   • Moderate persistent asthma with status asthmaticus   • Constipation   • Mixed hyperlipidemia   • Essential hypertension   • RLS (restless legs syndrome)   • Type 2 diabetes mellitus without complication (CMS/HCC)   • Dry scalp   • Acquired hypothyroidism   • Chronic pain of right knee   • Chronic pain of both knees   • Encounter for immunization   • DDD (degenerative disc disease), cervical   • Reactive depression   • Dyspareunia, female   • Mild episode of recurrent major depressive disorder (CMS/HCC)   • Chronic bilateral low back pain with bilateral  sciatica   • Lumbar radiculopathy   • Cervical radiculitis   • Primary osteoarthritis of knees, bilateral   • Tendinitis of left rotator cuff   • Rotator cuff tendinitis, right     Past Medical History:   Diagnosis Date   • Acquired hypothyroidism 2/21/2018   • Allergic rhinitis    • Asthma     last asthma attack 2-3 month ago, SEASONAL ALLERGIES   • Chest pain     RELATED TO GALLBLADDER PER PT   • Constipation    • Cough    • DDD (degenerative disc disease), cervical 7/25/2018   • Diabetes mellitus (CMS/[facility])    • Elevated cholesterol    • GERD (gastroesophageal reflux disease)    • Hyperlipidemia    • Hypertension    • Malaise and fatigue    • Menopause, premature    • Ovarian cancer (CMS/[facility]) 1985   • PONV (postoperative nausea and vomiting)    • RLS (restless legs syndrome)    • Swelling, limb        Social History:  Social History     Socioeconomic History   • Marital status:      Spouse name: moiz   • Number of children: 2   • Years of education: 8   • Highest education level: Not on file   Occupational History   • Occupation: retired   Social Needs   • Financial resource strain: Not very hard   • Food insecurity     Worry: Never true     Inability: Never true   • Transportation needs     Medical: No     Non-medical: No   Tobacco Use   • Smoking status: Former Smoker     Packs/day: 0.50     Years: 25.00     Pack years: 12.50     Types: Cigarettes   • Smokeless tobacco: Former User     Quit date: 7/6/2008   • Tobacco comment: 25-30 YEARS   Substance and Sexual Activity   • Alcohol use: No   • Drug use: No   • Sexual activity: Defer   Lifestyle   • Physical activity     Days per week: 3 days     Minutes per session: 30 min   • Stress: Very much   Relationships   • Social connections     Talks on phone: Once a week     Gets together: Never     Attends [employer] service: More than 4 times per year     Active member of club or organization: No     Attends meetings of clubs or organizations: Never      Relationship status:        Surgical History:  Past Surgical History:   Procedure Laterality Date   • CERVICAL EPIDURAL N/A 1/15/2020    Procedure: CERVICAL EPIDURAL C7/T1;  Surgeon: Tha Rutledge DO;  Location: [facility] OR;  Service: Pain Management   •  SECTION     • COLONOSCOPY     • ENDOSCOPY N/A 3/22/2018    Procedure: ESOPHAGOGASTRODUODENOSCOPY WITH ANESTHESIA;  Surgeon: Don Tovar MD;  Location: [facility] OR;  Service: Gastroenterology   • HYSTERECTOMY  1985    ovarian ca   • LUMBAR EPIDURAL INJECTION N/A 2019    Procedure: LUMBAR EPIDURAL 1ST VISIT L4/5;  Surgeon: Tha Rutledge DO;  Location:  [facility] OR;  Service: Pain Management   • NECK SURGERY      C6-7 ACDF   • OVARY SURGERY     • RI LAP,CHOLECYSTECTOMY N/A 2016    Procedure: CHOLECYSTECTOMY LAPAROSCOPIC;  Surgeon: Don Tovar MD;  Location: [facility] OR;  Service: General   • WRIST SURGERY         Radiology:   Xr Shoulder 2+ View Bilateral    Result Date: 2/3/2021    No acute findings in the bilateral shoulders.  This report was finalized on 2/3/2021 9:55 AM by Dr. Anil Buitrago MD.      Radiographs of bilateral shoulders are negative by report and by my review.      Examination:   Examination left shoulder demonstrates limited flexion with moderate signs of impingement.  She has limited internal rotation to her lower lumbar spine and this is symmetrical with the right.  She demonstrates no localized tenderness to the acromioclavicular joint or biceps tendon has negative speeds test negative crossarm adduction.  He demonstrates no obvious muscle wasting bilaterally.      Right shoulder with similar findings of moderate impingement limited internal rotation.      Assessment & Plan:   75 y.o. female presents chronic posterior bilateral shoulder pain with radiation down both arms history of cervical spine surgery x2 and carpal tunnel release on the right.  Presentation is certainly suspicious for chronic  bilateral shoulder pain related to her neck pathology.  As a diagnostic maneuver today she was provided Depo-Medrol 80 mg lidocaine block subacromial space with 10 cc of lidocaine.  She demonstrated significant decrease in pain but continued to have moderate limitation of motion.  We discussed her options she is referred to physical therapy for lateral rotator cuff tendinitis protocol.  We will see her back in 8 weeks.  We will assess her response to steroid injection right shoulder.  May consider EMG nerve conduction studies in the future.        Cc:  Miranda Bo, ZHOU              This document has been electronically signed by Omar Saenz MD   February 7, 2021 14:11 EST

## 2021-02-07 PROBLEM — M75.82 TENDINITIS OF LEFT ROTATOR CUFF: Status: ACTIVE | Noted: 2021-02-07

## 2021-02-07 PROBLEM — M75.81 ROTATOR CUFF TENDINITIS, RIGHT: Status: ACTIVE | Noted: 2021-02-07

## 2021-02-26 ENCOUNTER — LAB (OUTPATIENT)
Dept: FAMILY MEDICINE CLINIC | Facility: CLINIC | Age: 76
End: 2021-02-26

## 2021-02-26 DIAGNOSIS — K21.00 GASTROESOPHAGEAL REFLUX DISEASE WITH ESOPHAGITIS: ICD-10-CM

## 2021-02-26 DIAGNOSIS — K59.03 DRUG-INDUCED CONSTIPATION: ICD-10-CM

## 2021-02-26 DIAGNOSIS — J45.42 MODERATE PERSISTENT ASTHMA WITH STATUS ASTHMATICUS: ICD-10-CM

## 2021-02-26 DIAGNOSIS — G25.81 RLS (RESTLESS LEGS SYNDROME): ICD-10-CM

## 2021-02-26 DIAGNOSIS — M50.30 DDD (DEGENERATIVE DISC DISEASE), CERVICAL: ICD-10-CM

## 2021-02-26 DIAGNOSIS — G89.29 CHRONIC BILATERAL LOW BACK PAIN WITH BILATERAL SCIATICA: ICD-10-CM

## 2021-02-26 DIAGNOSIS — E55.9 VITAMIN D DEFICIENCY: ICD-10-CM

## 2021-02-26 DIAGNOSIS — E78.2 MIXED HYPERLIPIDEMIA: ICD-10-CM

## 2021-02-26 DIAGNOSIS — I10 ESSENTIAL HYPERTENSION: ICD-10-CM

## 2021-02-26 DIAGNOSIS — M54.41 CHRONIC BILATERAL LOW BACK PAIN WITH BILATERAL SCIATICA: ICD-10-CM

## 2021-02-26 DIAGNOSIS — M54.42 CHRONIC BILATERAL LOW BACK PAIN WITH BILATERAL SCIATICA: ICD-10-CM

## 2021-02-26 DIAGNOSIS — E11.9 TYPE 2 DIABETES MELLITUS WITHOUT COMPLICATION, WITHOUT LONG-TERM CURRENT USE OF INSULIN (HCC): ICD-10-CM

## 2021-02-26 DIAGNOSIS — E03.9 ACQUIRED HYPOTHYROIDISM: ICD-10-CM

## 2021-02-27 LAB
25(OH)D3+25(OH)D2 SERPL-MCNC: 59.8 NG/ML (ref 30–100)
ALBUMIN SERPL-MCNC: 4.3 G/DL (ref 3.5–5.2)
ALBUMIN/GLOB SERPL: 2 G/DL
ALP SERPL-CCNC: 67 U/L (ref 39–117)
ALT SERPL-CCNC: 22 U/L (ref 1–33)
AST SERPL-CCNC: 15 U/L (ref 1–32)
BASOPHILS # BLD AUTO: 0.05 10*3/MM3 (ref 0–0.2)
BASOPHILS NFR BLD AUTO: 0.7 % (ref 0–1.5)
BILIRUB SERPL-MCNC: 0.3 MG/DL (ref 0–1.2)
BUN SERPL-MCNC: 23 MG/DL (ref 8–23)
BUN/CREAT SERPL: 24.7 (ref 7–25)
CALCIUM SERPL-MCNC: 9.3 MG/DL (ref 8.6–10.5)
CHLORIDE SERPL-SCNC: 106 MMOL/L (ref 98–107)
CHOLEST SERPL-MCNC: 171 MG/DL (ref 0–200)
CO2 SERPL-SCNC: 27.8 MMOL/L (ref 22–29)
CREAT SERPL-MCNC: 0.93 MG/DL (ref 0.57–1)
EOSINOPHIL # BLD AUTO: 0.14 10*3/MM3 (ref 0–0.4)
EOSINOPHIL NFR BLD AUTO: 1.9 % (ref 0.3–6.2)
ERYTHROCYTE [DISTWIDTH] IN BLOOD BY AUTOMATED COUNT: 13.4 % (ref 12.3–15.4)
GLOBULIN SER CALC-MCNC: 2.2 GM/DL
GLUCOSE SERPL-MCNC: 102 MG/DL (ref 65–99)
HBA1C MFR BLD: 6.65 % (ref 4.8–5.6)
HCT VFR BLD AUTO: 43.5 % (ref 34–46.6)
HDLC SERPL-MCNC: 76 MG/DL (ref 40–60)
HGB BLD-MCNC: 14.1 G/DL (ref 12–15.9)
IMM GRANULOCYTES # BLD AUTO: 0.01 10*3/MM3 (ref 0–0.05)
IMM GRANULOCYTES NFR BLD AUTO: 0.1 % (ref 0–0.5)
LDLC SERPL CALC-MCNC: 85 MG/DL (ref 0–100)
LYMPHOCYTES # BLD AUTO: 2.51 10*3/MM3 (ref 0.7–3.1)
LYMPHOCYTES NFR BLD AUTO: 34 % (ref 19.6–45.3)
MCH RBC QN AUTO: 31.4 PG (ref 26.6–33)
MCHC RBC AUTO-ENTMCNC: 32.4 G/DL (ref 31.5–35.7)
MCV RBC AUTO: 96.9 FL (ref 79–97)
MICROALBUMIN UR-MCNC: 5.4 UG/ML
MONOCYTES # BLD AUTO: 0.41 10*3/MM3 (ref 0.1–0.9)
MONOCYTES NFR BLD AUTO: 5.5 % (ref 5–12)
NEUTROPHILS # BLD AUTO: 4.27 10*3/MM3 (ref 1.7–7)
NEUTROPHILS NFR BLD AUTO: 57.8 % (ref 42.7–76)
NRBC BLD AUTO-RTO: 0 /100 WBC (ref 0–0.2)
PLATELET # BLD AUTO: 285 10*3/MM3 (ref 140–450)
POTASSIUM SERPL-SCNC: 4.6 MMOL/L (ref 3.5–5.2)
PROT SERPL-MCNC: 6.5 G/DL (ref 6–8.5)
RBC # BLD AUTO: 4.49 10*6/MM3 (ref 3.77–5.28)
SODIUM SERPL-SCNC: 142 MMOL/L (ref 136–145)
TRIGL SERPL-MCNC: 46 MG/DL (ref 0–150)
TSH SERPL DL<=0.005 MIU/L-ACNC: 2.82 UIU/ML (ref 0.27–4.2)
URATE SERPL-MCNC: 6.1 MG/DL (ref 2.4–5.7)
VIT B12 SERPL-MCNC: 543 PG/ML (ref 211–946)
VLDLC SERPL CALC-MCNC: 10 MG/DL (ref 5–40)
WBC # BLD AUTO: 7.39 10*3/MM3 (ref 3.4–10.8)

## 2021-03-02 DIAGNOSIS — J30.2 OTHER SEASONAL ALLERGIC RHINITIS: ICD-10-CM

## 2021-03-02 DIAGNOSIS — G25.81 RESTLESS LEG SYNDROME: ICD-10-CM

## 2021-03-02 DIAGNOSIS — E11.9 TYPE 2 DIABETES MELLITUS WITHOUT COMPLICATION, WITHOUT LONG-TERM CURRENT USE OF INSULIN (HCC): ICD-10-CM

## 2021-03-02 DIAGNOSIS — E03.9 ACQUIRED HYPOTHYROIDISM: ICD-10-CM

## 2021-03-02 DIAGNOSIS — K21.00 GASTROESOPHAGEAL REFLUX DISEASE WITH ESOPHAGITIS: ICD-10-CM

## 2021-03-02 DIAGNOSIS — M19.90 ARTHRITIS: ICD-10-CM

## 2021-03-02 DIAGNOSIS — G89.29 CHRONIC PAIN OF RIGHT KNEE: ICD-10-CM

## 2021-03-02 DIAGNOSIS — F32.9 REACTIVE DEPRESSION: ICD-10-CM

## 2021-03-02 DIAGNOSIS — K59.00 CONSTIPATION, UNSPECIFIED CONSTIPATION TYPE: ICD-10-CM

## 2021-03-02 DIAGNOSIS — M25.561 CHRONIC PAIN OF RIGHT KNEE: ICD-10-CM

## 2021-03-02 DIAGNOSIS — M50.30 DDD (DEGENERATIVE DISC DISEASE), CERVICAL: ICD-10-CM

## 2021-03-02 DIAGNOSIS — I10 ESSENTIAL HYPERTENSION: ICD-10-CM

## 2021-03-02 DIAGNOSIS — R60.0 LOCALIZED EDEMA: ICD-10-CM

## 2021-03-02 RX ORDER — FLUOXETINE HYDROCHLORIDE 20 MG/1
20 CAPSULE ORAL DAILY
Qty: 30 CAPSULE | Refills: 5 | Status: SHIPPED | OUTPATIENT
Start: 2021-03-02 | End: 2021-03-03 | Stop reason: SDUPTHER

## 2021-03-02 RX ORDER — PANTOPRAZOLE SODIUM 40 MG/1
40 TABLET, DELAYED RELEASE ORAL DAILY
Qty: 90 TABLET | Refills: 3 | Status: SHIPPED | OUTPATIENT
Start: 2021-03-02 | End: 2021-03-03 | Stop reason: SDUPTHER

## 2021-03-02 RX ORDER — DOCUSATE SODIUM 100 MG/1
100 CAPSULE, LIQUID FILLED ORAL 2 TIMES DAILY
Qty: 180 CAPSULE | Refills: 4 | Status: SHIPPED | OUTPATIENT
Start: 2021-03-02 | End: 2021-03-03 | Stop reason: SDUPTHER

## 2021-03-02 RX ORDER — FUROSEMIDE 20 MG/1
20 TABLET ORAL DAILY PRN
Qty: 90 TABLET | Refills: 3 | Status: SHIPPED | OUTPATIENT
Start: 2021-03-02 | End: 2021-03-03 | Stop reason: SDUPTHER

## 2021-03-02 RX ORDER — TRIAMCINOLONE ACETONIDE 1 MG/G
CREAM TOPICAL 3 TIMES DAILY
Qty: 80 G | Refills: 3 | Status: SHIPPED | OUTPATIENT
Start: 2021-03-02 | End: 2021-03-03 | Stop reason: SDUPTHER

## 2021-03-02 RX ORDER — CYCLOBENZAPRINE HCL 10 MG
10 TABLET ORAL 3 TIMES DAILY PRN
Qty: 180 TABLET | Refills: 3 | Status: SHIPPED | OUTPATIENT
Start: 2021-03-02 | End: 2021-03-03 | Stop reason: SDUPTHER

## 2021-03-02 RX ORDER — CETIRIZINE HYDROCHLORIDE 10 MG/1
10 TABLET ORAL 2 TIMES DAILY
Qty: 180 TABLET | Refills: 3 | Status: SHIPPED | OUTPATIENT
Start: 2021-03-02 | End: 2021-03-03 | Stop reason: SDUPTHER

## 2021-03-02 RX ORDER — ROSUVASTATIN CALCIUM 5 MG/1
5 TABLET, COATED ORAL NIGHTLY
Qty: 90 TABLET | Refills: 3 | Status: SHIPPED | OUTPATIENT
Start: 2021-03-02 | End: 2021-03-03 | Stop reason: SDUPTHER

## 2021-03-02 RX ORDER — PSEUDOEPHEDRINE HYDROCHLORIDE 60 MG/1
60 TABLET, FILM COATED ORAL EVERY 4 HOURS PRN
Qty: 30 TABLET | Refills: 1 | Status: SHIPPED | OUTPATIENT
Start: 2021-03-02 | End: 2021-03-03 | Stop reason: SDUPTHER

## 2021-03-02 RX ORDER — BACLOFEN 10 MG/1
10 TABLET ORAL 3 TIMES DAILY PRN
Qty: 90 TABLET | Refills: 3 | Status: SHIPPED | OUTPATIENT
Start: 2021-03-02 | End: 2021-03-03 | Stop reason: SDUPTHER

## 2021-03-02 RX ORDER — MONTELUKAST SODIUM 10 MG/1
10 TABLET ORAL NIGHTLY
Qty: 90 TABLET | Refills: 3 | Status: SHIPPED | OUTPATIENT
Start: 2021-03-02 | End: 2021-03-03 | Stop reason: SDUPTHER

## 2021-03-02 RX ORDER — IBUPROFEN 800 MG/1
800 TABLET ORAL EVERY 8 HOURS PRN
Qty: 90 TABLET | Refills: 3 | Status: SHIPPED | OUTPATIENT
Start: 2021-03-02 | End: 2021-03-03 | Stop reason: SDUPTHER

## 2021-03-02 RX ORDER — VERAPAMIL HYDROCHLORIDE 240 MG/1
240 TABLET, FILM COATED, EXTENDED RELEASE ORAL NIGHTLY
Qty: 90 TABLET | Refills: 3 | Status: SHIPPED | OUTPATIENT
Start: 2021-03-02 | End: 2021-03-03 | Stop reason: SDUPTHER

## 2021-03-02 RX ORDER — POTASSIUM CHLORIDE 1500 MG/1
20 TABLET, FILM COATED, EXTENDED RELEASE ORAL DAILY PRN
Qty: 90 TABLET | Refills: 3 | Status: SHIPPED | OUTPATIENT
Start: 2021-03-02 | End: 2021-03-03 | Stop reason: SDUPTHER

## 2021-03-02 RX ORDER — FLUTICASONE PROPIONATE 50 MCG
SPRAY, SUSPENSION (ML) NASAL
Qty: 9.9 ML | Refills: 3 | Status: SHIPPED | OUTPATIENT
Start: 2021-03-02 | End: 2021-03-03 | Stop reason: SDUPTHER

## 2021-03-02 RX ORDER — ROPINIROLE 2 MG/1
2 TABLET, FILM COATED ORAL NIGHTLY
Qty: 90 TABLET | Refills: 3 | Status: SHIPPED | OUTPATIENT
Start: 2021-03-02 | End: 2021-03-03 | Stop reason: SDUPTHER

## 2021-03-02 RX ORDER — PLECANATIDE 3 MG/1
1 TABLET ORAL DAILY
Qty: 7 TABLET | Refills: 0 | COMMUNITY
Start: 2021-03-02 | End: 2021-03-03 | Stop reason: SDUPTHER

## 2021-03-02 RX ORDER — LEVOTHYROXINE SODIUM 0.05 MG/1
50 TABLET ORAL DAILY
Qty: 90 TABLET | Refills: 3 | Status: SHIPPED | OUTPATIENT
Start: 2021-03-02 | End: 2021-03-03 | Stop reason: SDUPTHER

## 2021-03-02 NOTE — TELEPHONE ENCOUNTER
----- Message from Caitie Cummings, Marvin Rep sent at 3/2/2021 11:41 AM EST -----  Had to res her apt with mb from Thursday until the 15th  [facility]

## 2021-03-03 ENCOUNTER — TELEPHONE (OUTPATIENT)
Dept: FAMILY MEDICINE CLINIC | Facility: CLINIC | Age: 76
End: 2021-03-03

## 2021-03-03 DIAGNOSIS — E11.9 TYPE 2 DIABETES MELLITUS WITHOUT COMPLICATION, WITHOUT LONG-TERM CURRENT USE OF INSULIN (HCC): ICD-10-CM

## 2021-03-03 DIAGNOSIS — K21.00 GASTROESOPHAGEAL REFLUX DISEASE WITH ESOPHAGITIS: ICD-10-CM

## 2021-03-03 DIAGNOSIS — J30.2 OTHER SEASONAL ALLERGIC RHINITIS: ICD-10-CM

## 2021-03-03 DIAGNOSIS — R60.0 LOCALIZED EDEMA: ICD-10-CM

## 2021-03-03 DIAGNOSIS — E03.9 ACQUIRED HYPOTHYROIDISM: ICD-10-CM

## 2021-03-03 DIAGNOSIS — F32.9 REACTIVE DEPRESSION: ICD-10-CM

## 2021-03-03 DIAGNOSIS — K59.00 CONSTIPATION, UNSPECIFIED CONSTIPATION TYPE: ICD-10-CM

## 2021-03-03 DIAGNOSIS — G25.81 RESTLESS LEG SYNDROME: ICD-10-CM

## 2021-03-03 DIAGNOSIS — I10 ESSENTIAL HYPERTENSION: ICD-10-CM

## 2021-03-03 DIAGNOSIS — M19.90 ARTHRITIS: ICD-10-CM

## 2021-03-03 RX ORDER — IBUPROFEN 800 MG/1
800 TABLET ORAL EVERY 8 HOURS PRN
Qty: 90 TABLET | Refills: 3 | Status: SHIPPED | OUTPATIENT
Start: 2021-03-03

## 2021-03-03 RX ORDER — ALLOPURINOL 100 MG/1
100 TABLET ORAL DAILY
Qty: 30 TABLET | Refills: 5 | Status: ON HOLD | OUTPATIENT
Start: 2021-03-03 | End: 2022-04-25

## 2021-03-03 RX ORDER — CETIRIZINE HYDROCHLORIDE 10 MG/1
10 TABLET ORAL 2 TIMES DAILY
Qty: 180 TABLET | Refills: 3 | Status: SHIPPED | OUTPATIENT
Start: 2021-03-03

## 2021-03-03 RX ORDER — ALLOPURINOL 100 MG/1
100 TABLET ORAL DAILY
Qty: 30 TABLET | Refills: 5 | Status: SHIPPED | OUTPATIENT
Start: 2021-03-03 | End: 2021-03-03 | Stop reason: SDUPTHER

## 2021-03-03 RX ORDER — CYCLOBENZAPRINE HCL 10 MG
10 TABLET ORAL 3 TIMES DAILY PRN
Qty: 180 TABLET | Refills: 3 | Status: ON HOLD | OUTPATIENT
Start: 2021-03-03 | End: 2022-04-25

## 2021-03-03 RX ORDER — FLUTICASONE PROPIONATE 50 MCG
SPRAY, SUSPENSION (ML) NASAL
Qty: 9.9 ML | Refills: 3 | Status: ON HOLD | OUTPATIENT
Start: 2021-03-03 | End: 2022-04-25

## 2021-03-03 RX ORDER — ROSUVASTATIN CALCIUM 5 MG/1
5 TABLET, COATED ORAL NIGHTLY
Qty: 90 TABLET | Refills: 3 | Status: SHIPPED | OUTPATIENT
Start: 2021-03-03

## 2021-03-03 RX ORDER — POTASSIUM CHLORIDE 1500 MG/1
20 TABLET, FILM COATED, EXTENDED RELEASE ORAL DAILY PRN
Qty: 90 TABLET | Refills: 3 | Status: ON HOLD | OUTPATIENT
Start: 2021-03-03 | End: 2022-04-25

## 2021-03-03 RX ORDER — FLUOXETINE HYDROCHLORIDE 20 MG/1
20 CAPSULE ORAL DAILY
Qty: 30 CAPSULE | Refills: 5 | Status: SHIPPED | OUTPATIENT
Start: 2021-03-03

## 2021-03-03 RX ORDER — LEVOTHYROXINE SODIUM 0.05 MG/1
50 TABLET ORAL DAILY
Qty: 90 TABLET | Refills: 3 | Status: SHIPPED | OUTPATIENT
Start: 2021-03-03

## 2021-03-03 RX ORDER — ROPINIROLE 2 MG/1
2 TABLET, FILM COATED ORAL NIGHTLY
Qty: 90 TABLET | Refills: 3 | Status: SHIPPED | OUTPATIENT
Start: 2021-03-03

## 2021-03-03 RX ORDER — DOCUSATE SODIUM 100 MG/1
100 CAPSULE, LIQUID FILLED ORAL 2 TIMES DAILY
Qty: 180 CAPSULE | Refills: 4 | Status: SHIPPED | OUTPATIENT
Start: 2021-03-03

## 2021-03-03 RX ORDER — TRIAMCINOLONE ACETONIDE 1 MG/G
CREAM TOPICAL 3 TIMES DAILY
Qty: 80 G | Refills: 3 | Status: ON HOLD | OUTPATIENT
Start: 2021-03-03 | End: 2022-04-25

## 2021-03-03 RX ORDER — MONTELUKAST SODIUM 10 MG/1
10 TABLET ORAL NIGHTLY
Qty: 90 TABLET | Refills: 3 | Status: SHIPPED | OUTPATIENT
Start: 2021-03-03

## 2021-03-03 RX ORDER — PANTOPRAZOLE SODIUM 40 MG/1
40 TABLET, DELAYED RELEASE ORAL DAILY
Qty: 90 TABLET | Refills: 3 | Status: SHIPPED | OUTPATIENT
Start: 2021-03-03

## 2021-03-03 RX ORDER — PSEUDOEPHEDRINE HYDROCHLORIDE 60 MG/1
60 TABLET, FILM COATED ORAL EVERY 4 HOURS PRN
Qty: 30 TABLET | Refills: 1 | Status: ON HOLD | OUTPATIENT
Start: 2021-03-03 | End: 2022-04-25

## 2021-03-03 RX ORDER — BACLOFEN 10 MG/1
10 TABLET ORAL 3 TIMES DAILY PRN
Qty: 90 TABLET | Refills: 3 | Status: SHIPPED | OUTPATIENT
Start: 2021-03-03

## 2021-03-03 RX ORDER — VERAPAMIL HYDROCHLORIDE 240 MG/1
240 TABLET, FILM COATED, EXTENDED RELEASE ORAL NIGHTLY
Qty: 90 TABLET | Refills: 3 | Status: SHIPPED | OUTPATIENT
Start: 2021-03-03

## 2021-03-03 RX ORDER — PLECANATIDE 3 MG/1
1 TABLET ORAL DAILY
Qty: 7 TABLET | Refills: 0 | COMMUNITY
Start: 2021-03-03

## 2021-03-03 RX ORDER — FUROSEMIDE 20 MG/1
20 TABLET ORAL DAILY PRN
Qty: 90 TABLET | Refills: 3 | Status: SHIPPED | OUTPATIENT
Start: 2021-03-03

## 2021-03-03 NOTE — TELEPHONE ENCOUNTER
----- Message from ZHOU Villar sent at 3/1/2021  3:59 PM EST -----  Uric acid level slightly elevated start patient on allopurinol 100 mg daily #30 with 5 refills.  Advise her on gout diet.  Cholesterol levels are stable, B12 is normal, vitamin D is normal.  Her A1c is elevated which means her diabetes is not in control.  Tell her I want her cut back on breads, sweets and carbohydrates.  Her TSH is normal.  Fasting glucose was 102.  Her GFR is 59 which is fairly consistent with her usual function.  Tell her no anti-inflammatory medications and a low salt diet, drink plenty of water.

## 2021-03-16 ENCOUNTER — OFFICE VISIT (OUTPATIENT)
Dept: FAMILY MEDICINE CLINIC | Facility: CLINIC | Age: 76
End: 2021-03-16

## 2021-03-16 VITALS
HEART RATE: 86 BPM | OXYGEN SATURATION: 96 % | BODY MASS INDEX: 22.66 KG/M2 | SYSTOLIC BLOOD PRESSURE: 165 MMHG | WEIGHT: 141 LBS | HEIGHT: 66 IN | DIASTOLIC BLOOD PRESSURE: 90 MMHG | TEMPERATURE: 96.8 F

## 2021-03-16 DIAGNOSIS — I10 ESSENTIAL HYPERTENSION: ICD-10-CM

## 2021-03-16 DIAGNOSIS — E78.2 MIXED HYPERLIPIDEMIA: ICD-10-CM

## 2021-03-16 DIAGNOSIS — E11.9 TYPE 2 DIABETES MELLITUS WITHOUT COMPLICATION, WITHOUT LONG-TERM CURRENT USE OF INSULIN (HCC): ICD-10-CM

## 2021-03-16 DIAGNOSIS — J01.00 SUBACUTE MAXILLARY SINUSITIS: Primary | ICD-10-CM

## 2021-03-16 DIAGNOSIS — M54.9 BACK PAIN, UNSPECIFIED BACK LOCATION, UNSPECIFIED BACK PAIN LATERALITY, UNSPECIFIED CHRONICITY: ICD-10-CM

## 2021-03-16 PROCEDURE — 99214 OFFICE O/P EST MOD 30 MIN: CPT | Performed by: NURSE PRACTITIONER

## 2021-03-16 RX ORDER — ECHINACEA PURPUREA EXTRACT 125 MG
1 TABLET ORAL AS NEEDED
Qty: 1 EACH | Refills: 12 | Status: ON HOLD | OUTPATIENT
Start: 2021-03-16 | End: 2022-04-25

## 2021-03-16 RX ORDER — DOXYCYCLINE HYCLATE 100 MG/1
100 CAPSULE ORAL 2 TIMES DAILY
Qty: 40 CAPSULE | Refills: 0 | Status: SHIPPED | OUTPATIENT
Start: 2021-03-16 | End: 2021-04-05

## 2021-03-16 NOTE — PROGRESS NOTES
Cathryn Cabello is a 75 y.o. female.     No chief complaint on file.    Cc  Sinus congestion and bleeding       History of Present Illness:    Sinus symptoms x2 weeks.  Has been doing steam and over-the-counter remedies.  Patient is blowing thick green sinus discharge with a blood-tinged.  Her cheeks are tender.  No fever.  No cough.    Recent cataract surgery-patient has had cataract surgery on both eyes since her last visit.    Chronic pain-under the care of pain management.  Patient is currently in physical therapy.    Hypertension-BP is mildly elevated today.  States she checks it at home has been running well.  States she is feeling bad this morning due to her sinus pressure.    Patient has had recent labs on February 26, 2021, her uric acid was elevated mildly at 6.1, HDL cholesterol mildly elevated at 76, glucose 102, A1c controlled at 6.6, GFR stable at 59.  Otherwise relatively normal.    Type 2 diabetes is well controlled.      The following portions of the patient's history and ROS were reviewed and updated as appropriate per provider:  Allergies, current medications, past family history, past medical history, past social history, past surgical history and problem list.    Review of Systems   Constitutional: Positive for activity change, chills and fatigue. Negative for appetite change and fever.   HENT: Positive for congestion, ear pain, sinus pressure and sinus pain. Negative for facial swelling, hearing loss, sore throat, trouble swallowing and voice change.    Eyes: Negative for pain, discharge and visual disturbance.   Respiratory: Negative for apnea, cough, chest tightness, shortness of breath and wheezing.    Cardiovascular: Negative for chest pain, palpitations and leg swelling.   Gastrointestinal: Negative for abdominal pain, blood in stool, constipation, diarrhea, nausea and vomiting.   Endocrine: Negative.    Genitourinary: Negative for difficulty urinating, dysuria and flank pain.  "  Musculoskeletal: Positive for arthralgias, back pain, gait problem, myalgias and neck pain. Negative for neck stiffness.   Skin: Negative.  Negative for color change.   Allergic/Immunologic: Positive for environmental allergies and immunocompromised state. Negative for food allergies.   Neurological: Positive for headaches (Sinus headache). Negative for dizziness, syncope and numbness.   Hematological: Negative.    Psychiatric/Behavioral: Negative for confusion, self-injury and suicidal ideas. The patient is not nervous/anxious.        Objective     /90   Pulse 86   Temp 96.8 °F (36 °C) (Temporal)   Ht 167.6 cm (66\")   Wt 64 kg (141 lb)   LMP 07/08/1986 (LMP Unknown)   SpO2 96%   BMI 22.76 kg/m²   Lab on 02/26/2021   Component Date Value Ref Range Status   • Microalbumin, Urine 02/26/2021 5.4  Not Estab. ug/mL Final   • Uric Acid 02/26/2021 6.1* 2.4 - 5.7 mg/dL Final   • Total Cholesterol 02/26/2021 171  0 - 200 mg/dL Final   • Triglycerides 02/26/2021 46  0 - 150 mg/dL Final   • HDL Cholesterol 02/26/2021 76* 40 - 60 mg/dL Final   • VLDL Cholesterol Edin 02/26/2021 10  5 - 40 mg/dL Final   • LDL Chol Calc ([facility]) 02/26/2021 85  0 - 100 mg/dL Final   • Vitamin B-12 02/26/2021 543  211 - 946 pg/mL Final   • 25 Hydroxy, Vitamin D 02/26/2021 59.8  30.0 - 100.0 ng/ml Final   • Hemoglobin A1C 02/26/2021 6.65* 4.80 - 5.60 % Final   • TSH 02/26/2021 2.820  0.270 - 4.200 uIU/mL Final   • Glucose 02/26/2021 102* 65 - 99 mg/dL Final   • BUN 02/26/2021 23  8 - 23 mg/dL Final   • Creatinine 02/26/2021 0.93  0.57 - 1.00 mg/dL Final   • eGFR Non  Am 02/26/2021 59* >60 mL/min/1.73 Final   • eGFR African Am 02/26/2021 71  >60 mL/min/1.73 Final   • BUN/Creatinine Ratio 02/26/2021 24.7  7.0 - 25.0 Final   • Sodium 02/26/2021 142  136 - 145 mmol/L Final   • Potassium 02/26/2021 4.6  3.5 - 5.2 mmol/L Final   • Chloride 02/26/2021 106  98 - 107 mmol/L Final   • Total CO2 02/26/2021 27.8  22.0 - 29.0 mmol/L Final   • " Calcium 02/26/2021 9.3  8.6 - 10.5 mg/dL Final   • Total Protein 02/26/2021 6.5  6.0 - 8.5 g/dL Final   • Albumin 02/26/2021 4.30  3.50 - 5.20 g/dL Final   • Globulin 02/26/2021 2.2  gm/dL Final   • A/G Ratio 02/26/2021 2.0  g/dL Final   • Total Bilirubin 02/26/2021 0.3  0.0 - 1.2 mg/dL Final   • Alkaline Phosphatase 02/26/2021 67  39 - 117 U/L Final   • AST (SGOT) 02/26/2021 15  1 - 32 U/L Final   • ALT (SGPT) 02/26/2021 22  1 - 33 U/L Final   • WBC 02/26/2021 7.39  3.40 - 10.80 10*3/mm3 Final   • RBC 02/26/2021 4.49  3.77 - 5.28 10*6/mm3 Final   • Hemoglobin 02/26/2021 14.1  12.0 - 15.9 g/dL Final   • Hematocrit 02/26/2021 43.5  34.0 - 46.6 % Final   • MCV 02/26/2021 96.9  79.0 - 97.0 fL Final   • MCH 02/26/2021 31.4  26.6 - 33.0 pg Final   • MCHC 02/26/2021 32.4  31.5 - 35.7 g/dL Final   • RDW 02/26/2021 13.4  12.3 - 15.4 % Final   • Platelets 02/26/2021 285  140 - 450 10*3/mm3 Final   • Neutrophil Rel % 02/26/2021 57.8  42.7 - 76.0 % Final   • Lymphocyte Rel % 02/26/2021 34.0  19.6 - 45.3 % Final   • Monocyte Rel % 02/26/2021 5.5  5.0 - 12.0 % Final   • Eosinophil Rel % 02/26/2021 1.9  0.3 - 6.2 % Final   • Basophil Rel % 02/26/2021 0.7  0.0 - 1.5 % Final   • Neutrophils Absolute 02/26/2021 4.27  1.70 - 7.00 10*3/mm3 Final   • Lymphocytes Absolute 02/26/2021 2.51  0.70 - 3.10 10*3/mm3 Final   • Monocytes Absolute 02/26/2021 0.41  0.10 - 0.90 10*3/mm3 Final   • Eosinophils Absolute 02/26/2021 0.14  0.00 - 0.40 10*3/mm3 Final   • Basophils Absolute 02/26/2021 0.05  0.00 - 0.20 10*3/mm3 Final   • Immature Granulocyte Rel % 02/26/2021 0.1  0.0 - 0.5 % Final   • Immature Grans Absolute 02/26/2021 0.01  0.00 - 0.05 10*3/mm3 Final   • nRBC 02/26/2021 0.0  0.0 - 0.2 /100 WBC Final       Physical Exam  Vitals reviewed.   Constitutional:       General: She is not in acute distress.     Appearance: Normal appearance. She is well-developed. She is not ill-appearing, toxic-appearing or diaphoretic.   HENT:      Head:  Normocephalic and atraumatic. Hair is normal.      Right Ear: Ear canal and external ear normal. Tympanic membrane is bulging.      Left Ear: Ear canal and external ear normal. Tympanic membrane is bulging.      Nose: Congestion present.      Right Sinus: Maxillary sinus tenderness present. No frontal sinus tenderness.      Left Sinus: Maxillary sinus tenderness present. No frontal sinus tenderness.      Mouth/Throat:      Lips: Pink. No lesions.      Pharynx: No oropharyngeal exudate.   Eyes:      General: Lids are normal. Vision grossly intact. Gaze aligned appropriately.         Right eye: No discharge.         Left eye: No discharge.      Conjunctiva/sclera: Conjunctivae normal.      Pupils: Pupils are equal, round, and reactive to light.   Neck:      Thyroid: No thyromegaly.      Trachea: No tracheal deviation.   Cardiovascular:      Rate and Rhythm: Normal rate and regular rhythm.      Pulses: Normal pulses.      Heart sounds: Normal heart sounds. No murmur. No friction rub. No gallop.    Pulmonary:      Effort: Pulmonary effort is normal. No accessory muscle usage or respiratory distress.      Breath sounds: Normal breath sounds. No wheezing or rales.   Chest:      Chest wall: No tenderness.   Abdominal:      General: Bowel sounds are normal. There is no distension.      Palpations: Abdomen is soft. There is no mass.      Tenderness: There is no abdominal tenderness. There is no guarding or rebound.   Musculoskeletal:      Right shoulder: Crepitus present. Decreased range of motion.      Left shoulder: Crepitus present. Decreased range of motion.      Cervical back: Normal range of motion and neck supple. Spasms and tenderness present. No spinous process tenderness or muscular tenderness. Normal range of motion.      Lumbar back: Tenderness present. Decreased range of motion.   Lymphadenopathy:      Cervical: No cervical adenopathy.   Skin:     General: Skin is warm and dry.      Capillary Refill: Capillary  refill takes less than 2 seconds.      Coloration: Skin is not cyanotic or pale.      Findings: No erythema or rash.      Nails: There is no clubbing.   Neurological:      General: No focal deficit present.      Mental Status: She is alert and oriented to person, place, and time.      Deep Tendon Reflexes: Reflexes are normal and symmetric.      Comments: CN 2-12 grossly intact    Psychiatric:         Attention and Perception: Attention and perception normal.         Mood and Affect: Mood and affect normal.         Speech: Speech normal.         Behavior: Behavior normal. Behavior is cooperative.         Thought Content: Thought content normal.         Cognition and Memory: Cognition normal.         Judgment: Judgment normal.         Assessment/Plan     Problem List Items Addressed This Visit        Cardiac and Vasculature    Mixed hyperlipidemia (Chronic)    Current Assessment & Plan     Lipid abnormalities are improving with treatment.  Nutritional counseling was provided., Pharmacotherapy as ordered. and Dietary education provided  Lipids will be reassessed in 6 months.         Essential hypertension (Chronic)    Current Assessment & Plan     Hypertension is Elevated today.  Continue current treatment regimen.  Dietary sodium restriction.  Weight loss.  Ambulatory blood pressure monitoring.  Reporting blood pressure reading greater than 150/90 or less than 100/60.  Blood pressure elevation today is likely due to acute illness.  Blood pressure will be reassessed at the next regular appointment.            Endocrine and Metabolic    Type 2 diabetes mellitus without complication (CMS/HCC) (Chronic)    Current Assessment & Plan     Diabetes is improving with treatment and lifestyle changes.   Reminded to bring in blood sugar diary at next visit.  Dietary recommendations for ADA diet.  Discussed ways to avoid symptomatic hypoglycemia.  Discussed sick day management.  Discussed foot care.  Reminded to get yearly  retinal exam.  Diabetes will be reassessed in 6 months.           Other Visit Diagnoses     Subacute maxillary sinusitis    -  Primary    Relevant Medications    doxycycline (VIBRAMYCIN) 100 MG capsule    sodium chloride (Ocean Nasal Spray) 0.65 % nasal spray          I have reviewed medication list and discussed with patient the possible side effects and interactions.  We have discussed purpose for medication, route, dosage, frequency.  Refill routine medications.    Recent labs reviewed and discussed with patient.  See above labs.     Pt has been instructed today regarding low fat heart smart diet. Weight management and routine exercise has been recommended. Avoid high fat foods, starchy foods and processed foods. Increase lean meats, fresh vegetables and fresh fruits.          Patient's Body mass index is 22.76 kg/m². BMI is within normal parameters. No follow-up required..    Coronavirus precautions have been reviewed and discussed.  I have discussed the CDC recommendations  of social distancing, hand washing and disinfecting commonly touched items. Reviewed need to notify PCP and self quarantine with mild symptoms.  Discussed procedure to obtain Covid-19 testing and notification of PCP/health dept/ED/Urgent Center if symptoms begin. Understanding verbalized.    Provided information on availability of coronavirus vaccine and where she may obtain locally.    I have discussed diagnosis in detail today allowing time for questions and answers. Patient is aware of reasons to seek urgent or emergent medical care as well as reasons to return to the clinic for evaluation. Possible side effects, interactions and progression of symptoms discussed as well. Patient / family states understanding.   Emotional support and active listening provided.       Fluids, rest, symptomatic treatment advised. Steam therapy. Dispose of tooth bush after 24 hours of starting antibiotics if ordered. Complete antibiotics as ordered.   Discussed possible side effects/interactions of medication. Discussed symptoms to report as well as reasons to seek urgent or emergent medical attention. Understanding stated.   Recommend follow up in 2-3 days if not improved, sooner if needed.     Follow up in 3 months. Routine labs fasting one week prior to next office visit. Return sooner if needed.           This document has been electronically signed by:  ZHOU Moncada, NP-C

## 2021-03-16 NOTE — ASSESSMENT & PLAN NOTE
Diabetes is improving with treatment and lifestyle changes.   Reminded to bring in blood sugar diary at next visit.  Dietary recommendations for ADA diet.  Discussed ways to avoid symptomatic hypoglycemia.  Discussed sick day management.  Discussed foot care.  Reminded to get yearly retinal exam.  Diabetes will be reassessed in 6 months.

## 2021-03-16 NOTE — ASSESSMENT & PLAN NOTE
Hypertension is Elevated today.  Continue current treatment regimen.  Dietary sodium restriction.  Weight loss.  Ambulatory blood pressure monitoring.  Reporting blood pressure reading greater than 150/90 or less than 100/60.  Blood pressure elevation today is likely due to acute illness.  Blood pressure will be reassessed at the next regular appointment.

## 2021-03-16 NOTE — ASSESSMENT & PLAN NOTE
Lipid abnormalities are improving with treatment.  Nutritional counseling was provided., Pharmacotherapy as ordered. and Dietary education provided  Lipids will be reassessed in 6 months.

## 2021-03-23 ENCOUNTER — TELEPHONE (OUTPATIENT)
Dept: FAMILY MEDICINE CLINIC | Facility: CLINIC | Age: 76
End: 2021-03-23

## 2021-03-23 NOTE — TELEPHONE ENCOUNTER
Caller: Tyler Cabello    Relationship to patient: Self    Best call back number: [phone]    Patient is needing: PATIENT IS STILL EXPERIENCING SYMPTOMS LIKE HEADACHE, SINUS PRESSURE IN FACE AND WOULD LIKE TO KNOW IF MARISA RAMIREZ COULD REQUEST ANOTHER MEDICATION TO HELP BETTER THAN THE ANABIOTICS SHE HAS PRESCRIBED IS NOT WORKING     PLEASE ADVISE

## 2021-03-31 ENCOUNTER — OFFICE VISIT (OUTPATIENT)
Dept: ORTHOPEDIC SURGERY | Facility: CLINIC | Age: 76
End: 2021-03-31

## 2021-03-31 VITALS — WEIGHT: 141.09 LBS | BODY MASS INDEX: 22.68 KG/M2 | HEIGHT: 66 IN | TEMPERATURE: 98 F

## 2021-03-31 DIAGNOSIS — M25.512 BILATERAL SHOULDER PAIN, UNSPECIFIED CHRONICITY: Primary | ICD-10-CM

## 2021-03-31 DIAGNOSIS — M25.511 BILATERAL SHOULDER PAIN, UNSPECIFIED CHRONICITY: Primary | ICD-10-CM

## 2021-03-31 PROCEDURE — 99213 OFFICE O/P EST LOW 20 MIN: CPT | Performed by: ORTHOPAEDIC SURGERY

## 2021-03-31 NOTE — PROGRESS NOTES
Follow-up Visit         Patient: Tyler Cabello  YOB: 1945  Date of Encounter: 03/31/2021      Chief  Complaint:   Chief Complaint   Patient presents with   • Right Shoulder - Pain, Follow-up   • Left Shoulder - Pain, Follow-up         HPI:  Tyler Cabello, 75 y.o. female presents in follow-up bilateral shoulder pain.  Her history is remarkable for cervical spine fusion 2014 carpal tunnel release right hand 2017.  Overall she is improved with bilateral shoulder pain and stiffness she is attending physical therapy has completed 4 weeks.  She reports improvement in her shoulder motion and decrease in her shoulder pain overall.  Was provided steroid injection acromial space left shoulder at last visit and reports that she has improved significantly.  She continues to experience mild numbness to her thumb bilaterally.      Medical History:  Patient Active Problem List   Diagnosis   • Gastroesophageal reflux disease with esophagitis   • Moderate persistent asthma with status asthmaticus   • Constipation   • Mixed hyperlipidemia   • Essential hypertension   • RLS (restless legs syndrome)   • Type 2 diabetes mellitus without complication (CMS/HCC)   • Dry scalp   • Acquired hypothyroidism   • Chronic pain of right knee   • Chronic pain of both knees   • Encounter for immunization   • DDD (degenerative disc disease), cervical   • Reactive depression   • Dyspareunia, female   • Mild episode of recurrent major depressive disorder (CMS/HCC)   • Chronic bilateral low back pain with bilateral sciatica   • Lumbar radiculopathy   • Cervical radiculitis   • Primary osteoarthritis of knees, bilateral   • Tendinitis of left rotator cuff   • Rotator cuff tendinitis, right     Past Medical History:   Diagnosis Date   • Acquired hypothyroidism 2/21/2018   • Allergic rhinitis    • Asthma     last asthma attack 2-3 month ago, SEASONAL ALLERGIES   • Chest pain     RELATED TO GALLBLADDER PER PT   • Constipation    • Cough     • DDD (degenerative disc disease), cervical 7/25/2018   • Diabetes mellitus (CMS/[facility])    • Elevated cholesterol    • GERD (gastroesophageal reflux disease)    • Hyperlipidemia    • Hypertension    • Malaise and fatigue    • Menopause, premature    • Ovarian cancer (CMS/HCC) 1985   • PONV (postoperative nausea and vomiting)    • RLS (restless legs syndrome)    • Swelling, limb          Social History:  Social History     Socioeconomic History   • Marital status:      Spouse name: moiz   • Number of children: 2   • Years of education: 8   • Highest education level: Not on file   Tobacco Use   • Smoking status: Former Smoker     Packs/day: 0.50     Years: 25.00     Pack years: 12.50     Types: Cigarettes   • Smokeless tobacco: Former User     Quit date: 7/6/2008   • Tobacco comment: 25-30 YEARS   Substance and Sexual Activity   • Alcohol use: No   • Drug use: No   • Sexual activity: Defer         Current Medications:    Current Outpatient Medications:   •  allopurinol (Zyloprim) 100 MG tablet, Take 1 tablet by mouth Daily., Disp: 30 tablet, Rfl: 5  •  baclofen (LIORESAL) 10 MG tablet, Take 1 tablet by mouth 3 (Three) Times a Day As Needed for Muscle Spasms., Disp: 90 tablet, Rfl: 3  •  cetirizine (zyrTEC) 10 MG tablet, Take 1 tablet by mouth 2 (Two) Times a Day., Disp: 180 tablet, Rfl: 3  •  cyclobenzaprine (FLEXERIL) 10 MG tablet, Take 1 tablet by mouth 3 (Three) Times a Day As Needed for Muscle Spasms., Disp: 180 tablet, Rfl: 3  •  diclofenac (VOLTAREN) 1 % gel gel, Apply 4 g topically to the appropriate area as directed 4 (Four) Times a Day As Needed (pain)., Disp: 4 tube, Rfl: 3  •  docusate sodium (COLACE) 100 MG capsule, Take 1 capsule by mouth 2 (Two) Times a Day., Disp: 180 capsule, Rfl: 4  •  doxycycline (VIBRAMYCIN) 100 MG capsule, Take 1 capsule by mouth 2 (Two) Times a Day for 20 days., Disp: 40 capsule, Rfl: 0  •  FLUoxetine (PROzac) 20 MG capsule, Take 1 capsule by mouth Daily., Disp: 30  capsule, Rfl: 5  •  fluticasone (Flonase) 50 MCG/ACT nasal spray, Administer 2 sprays both nostrils once daily, Disp: 9.9 mL, Rfl: 3  •  furosemide (LASIX) 20 MG tablet, Take 1 tablet by mouth Daily As Needed (swelling)., Disp: 90 tablet, Rfl: 3  •  gabapentin (NEURONTIN) 100 MG capsule, 1 tablet twice daily, Disp: 60 capsule, Rfl: 2  •  HYDROcodone-acetaminophen (NORCO) 7.5-325 MG per tablet, Take 1 tablet by mouth Every 6 (Six) Hours As Needed for Moderate Pain ., Disp: 60 tablet, Rfl: 0  •  levothyroxine (Synthroid) 50 MCG tablet, Take 1 tablet by mouth Daily., Disp: 90 tablet, Rfl: 3  •  linaclotide (LINZESS) 290 MCG capsule capsule, Take 1 capsule by mouth Every Morning Before Breakfast., Disp: 90 capsule, Rfl: 3  •  montelukast (Singulair) 10 MG tablet, Take 1 tablet by mouth Every Night., Disp: 90 tablet, Rfl: 3  •  pantoprazole (PROTONIX) 40 MG EC tablet, Take 1 tablet by mouth Daily., Disp: 90 tablet, Rfl: 3  •  Plecanatide (Trulance) 3 MG tablet, Take 1 tablet by mouth Daily., Disp: 7 tablet, Rfl: 0  •  potassium chloride ER (K-TAB) 20 MEQ tablet controlled-release ER tablet, Take 1 tablet by mouth Daily As Needed (when take lasix)., Disp: 90 tablet, Rfl: 3  •  pseudoephedrine (SUDAFED) 60 MG tablet, Take 1 tablet by mouth Every 4 (Four) Hours As Needed for Congestion., Disp: 30 tablet, Rfl: 1  •  rOPINIRole (REQUIP) 2 MG tablet, Take 1 tablet by mouth Every Night. Take 1 hour before bedtime., Disp: 90 tablet, Rfl: 3  •  rosuvastatin (CRESTOR) 5 MG tablet, Take 1 tablet by mouth Every Night., Disp: 90 tablet, Rfl: 3  •  sodium chloride (Ocean Nasal Spray) 0.65 % nasal spray, 1 spray into the nostril(s) as directed by provider As Needed for Congestion., Disp: 1 each, Rfl: 12  •  traMADol ER (ULTRAM-ER) 100 MG 24 hr tablet, Take 2 tablets by mouth Daily., Disp: 60 tablet, Rfl: 2  •  triamcinolone (KENALOG) 0.1 % cream, Apply  topically to the appropriate area as directed 3 (Three) Times a Day. to affected  area, Disp: 80 g, Rfl: 3  •  verapamil SR (CALAN-SR) 240 MG CR tablet, Take 1 tablet by mouth Every Night., Disp: 90 tablet, Rfl: 3  •  XIIDRA 5 % ophthalmic solution, INSTILL 1 DROP INTO EACH EYE BID, Disp: , Rfl:   •  ibuprofen (ADVIL,MOTRIN) 800 MG tablet, Take 1 tablet by mouth Every 8 (Eight) Hours As Needed for Mild Pain ., Disp: 90 tablet, Rfl: 3      Allergies:  Allergies   Allergen Reactions   • Sulfa Antibiotics Anaphylaxis   • Nickel Swelling   • Latex Rash   • Lisinopril Cough         Family History:  Family History   Problem Relation Age of Onset   • Breast cancer Neg Hx          Surgical History:  Past Surgical History:   Procedure Laterality Date   • CERVICAL EPIDURAL N/A 1/15/2020    Procedure: CERVICAL EPIDURAL C7/T1;  Surgeon: Tha Rutledge DO;  Location: [facility] OR;  Service: Pain Management   •  SECTION     • COLONOSCOPY     • ENDOSCOPY N/A 3/22/2018    Procedure: ESOPHAGOGASTRODUODENOSCOPY WITH ANESTHESIA;  Surgeon: Don Tovar MD;  Location: [facility] OR;  Service: Gastroenterology   • HYSTERECTOMY  1985    ovarian ca   • LUMBAR EPIDURAL INJECTION N/A 2019    Procedure: LUMBAR EPIDURAL 1ST VISIT L4/5;  Surgeon: Tha Rutledge DO;  Location: [facility] OR;  Service: Pain Management   • NECK SURGERY      C6-7 ACDF   • OVARY SURGERY     • UT LAP,CHOLECYSTECTOMY N/A 2016    Procedure: CHOLECYSTECTOMY LAPAROSCOPIC;  Surgeon: Don Tovar MD;  Location: [facility] OR;  Service: General   • WRIST SURGERY           Examination:   Examination shoulder reveals forward elevation to 160 degrees with 50 degree arc of internal and external rotation left shoulder elevation to 150 degrees further elevated passively.  Has 40 degree arc of internal and external rotation.  Demonstrates diminished sensation over the dorsal and her aspects of her thumbs bilaterally.          Assessment & Plan:   75 y.o. female presents in follow-up bilateral shoulder pain with a history of cervical  fusion and carpal tunnel release on the right with mild sensory loss bilateral hands.  I suspect this is related to her cervical spine fusion.  As she continues to improve with physical therapy she is encouraged to continue and provided referral for 6 additional weeks.  We will follow-up in 8 weeks.         Diagnosis Plan   1. Bilateral shoulder pain, unspecified chronicity                 Cc:  Miranda Bo APRN              This document has been electronically signed by Omar Saenz MD   March 31, 2021 10:11 EDT

## 2021-04-29 DIAGNOSIS — M54.9 BACK PAIN, UNSPECIFIED BACK LOCATION, UNSPECIFIED BACK PAIN LATERALITY, UNSPECIFIED CHRONICITY: Primary | ICD-10-CM

## 2021-05-26 ENCOUNTER — HOSPITAL ENCOUNTER (OUTPATIENT)
Dept: GENERAL RADIOLOGY | Facility: HOSPITAL | Age: 76
Discharge: HOME | End: 2021-05-26
Admitting: ORTHOPAEDIC SURGERY

## 2021-05-26 ENCOUNTER — OFFICE VISIT (OUTPATIENT)
Dept: ORTHOPEDIC SURGERY | Facility: CLINIC | Age: 76
End: 2021-05-26

## 2021-05-26 VITALS — WEIGHT: 141.09 LBS | BODY MASS INDEX: 22.68 KG/M2 | HEIGHT: 66 IN | HEART RATE: 98 BPM

## 2021-05-26 DIAGNOSIS — M17.11 PRIMARY OSTEOARTHRITIS OF RIGHT KNEE: ICD-10-CM

## 2021-05-26 DIAGNOSIS — M75.81 ROTATOR CUFF TENDINITIS, RIGHT: Primary | ICD-10-CM

## 2021-05-26 DIAGNOSIS — M25.561 RIGHT KNEE PAIN, UNSPECIFIED CHRONICITY: ICD-10-CM

## 2021-05-26 DIAGNOSIS — M25.561 RIGHT KNEE PAIN, UNSPECIFIED CHRONICITY: Primary | ICD-10-CM

## 2021-05-26 PROCEDURE — 73562 X-RAY EXAM OF KNEE 3: CPT

## 2021-05-26 PROCEDURE — 73562 X-RAY EXAM OF KNEE 3: CPT | Performed by: RADIOLOGY

## 2021-05-26 PROCEDURE — 20610 DRAIN/INJ JOINT/BURSA W/O US: CPT | Performed by: ORTHOPAEDIC SURGERY

## 2021-05-26 PROCEDURE — 99213 OFFICE O/P EST LOW 20 MIN: CPT | Performed by: ORTHOPAEDIC SURGERY

## 2021-05-26 RX ADMIN — LIDOCAINE HYDROCHLORIDE 5 ML: 20 INJECTION, SOLUTION INFILTRATION; PERINEURAL at 12:03

## 2021-05-26 RX ADMIN — METHYLPREDNISOLONE ACETATE 80 MG: 80 INJECTION, SUSPENSION INTRA-ARTICULAR; INTRALESIONAL; INTRAMUSCULAR; SOFT TISSUE at 12:03

## 2021-05-26 NOTE — PROGRESS NOTES
Follow-up Visit         Patient: Tyler Cabello  YOB: 1945  Date of Encounter: 05/26/2021      Chief  Complaint:   Chief Complaint   Patient presents with   • Right Shoulder - Pain, Follow-up   • Left Shoulder - Pain, Follow-up   • Right Knee - Pain   • Shoulder Pain     Follow up from physcial therapy          HPI:  Tyler Cabello, 76 y.o. female presents in follow-up bilateral shoulder pain.  She has history of cervical spine fusion in 2014.  She underwent carpal tunnel release right hand in 2017.  She has continued to experience posterior shoulder pain overall improved however after attending physical therapy for several weeks she reports now that she had to quit her physical therapy because it increased her shoulder pain.  Her symptoms and pain are primarily posterior aspect of both shoulders.    Denies weakness to her arms or numbness in her hands.  Her second complaint is right knee pain.  She has been treated with steroid injections bilateral knees last provided in 2019 she does report improvement following this.    Medical History:  Patient Active Problem List   Diagnosis   • Gastroesophageal reflux disease with esophagitis   • Moderate persistent asthma with status asthmaticus   • Constipation   • Mixed hyperlipidemia   • Essential hypertension   • RLS (restless legs syndrome)   • Type 2 diabetes mellitus without complication (CMS/HCC)   • Dry scalp   • Acquired hypothyroidism   • Chronic pain of right knee   • Chronic pain of both knees   • Encounter for immunization   • DDD (degenerative disc disease), cervical   • Reactive depression   • Dyspareunia, female   • Mild episode of recurrent major depressive disorder (CMS/HCC)   • Chronic bilateral low back pain with bilateral sciatica   • Lumbar radiculopathy   • Cervical radiculitis   • Primary osteoarthritis of knees, bilateral   • Tendinitis of left rotator cuff   • Rotator cuff tendinitis, right   • Primary osteoarthritis of right knee      Past Medical History:   Diagnosis Date   • Acquired hypothyroidism 2/21/2018   • Allergic rhinitis    • Asthma     last asthma attack 2-3 month ago, SEASONAL ALLERGIES   • Chest pain     RELATED TO GALLBLADDER PER PT   • Constipation    • Cough    • DDD (degenerative disc disease), cervical 7/25/2018   • Diabetes mellitus (CMS/HCC)    • Elevated cholesterol    • GERD (gastroesophageal reflux disease)    • Hyperlipidemia    • Hypertension    • Malaise and fatigue    • Menopause, premature    • Ovarian cancer (CMS/HCC) 1985   • PONV (postoperative nausea and vomiting)    • RLS (restless legs syndrome)    • Swelling, limb          Social History:  Social History     Socioeconomic History   • Marital status:      Spouse name: moiz   • Number of children: 2   • Years of education: 8   • Highest education level: Not on file   Tobacco Use   • Smoking status: Former Smoker     Packs/day: 0.50     Years: 25.00     Pack years: 12.50     Types: Cigarettes   • Smokeless tobacco: Former User     Quit date: 7/6/2008   • Tobacco comment: 25-30 YEARS   Substance and Sexual Activity   • Alcohol use: No   • Drug use: No   • Sexual activity: Defer         Current Medications:    Current Outpatient Medications:   •  allopurinol (Zyloprim) 100 MG tablet, Take 1 tablet by mouth Daily., Disp: 30 tablet, Rfl: 5  •  baclofen (LIORESAL) 10 MG tablet, Take 1 tablet by mouth 3 (Three) Times a Day As Needed for Muscle Spasms., Disp: 90 tablet, Rfl: 3  •  cetirizine (zyrTEC) 10 MG tablet, Take 1 tablet by mouth 2 (Two) Times a Day., Disp: 180 tablet, Rfl: 3  •  cyclobenzaprine (FLEXERIL) 10 MG tablet, Take 1 tablet by mouth 3 (Three) Times a Day As Needed for Muscle Spasms., Disp: 180 tablet, Rfl: 3  •  diclofenac (VOLTAREN) 1 % gel gel, Apply 4 g topically to the appropriate area as directed 4 (Four) Times a Day As Needed (pain)., Disp: 4 tube, Rfl: 3  •  docusate sodium (COLACE) 100 MG capsule, Take 1 capsule by mouth 2 (Two) Times  a Day., Disp: 180 capsule, Rfl: 4  •  FLUoxetine (PROzac) 20 MG capsule, Take 1 capsule by mouth Daily., Disp: 30 capsule, Rfl: 5  •  fluticasone (Flonase) 50 MCG/ACT nasal spray, Administer 2 sprays both nostrils once daily, Disp: 9.9 mL, Rfl: 3  •  furosemide (LASIX) 20 MG tablet, Take 1 tablet by mouth Daily As Needed (swelling)., Disp: 90 tablet, Rfl: 3  •  gabapentin (NEURONTIN) 100 MG capsule, 1 tablet twice daily, Disp: 60 capsule, Rfl: 2  •  HYDROcodone-acetaminophen (NORCO) 7.5-325 MG per tablet, Take 1 tablet by mouth Every 6 (Six) Hours As Needed for Moderate Pain ., Disp: 60 tablet, Rfl: 0  •  ibuprofen (ADVIL,MOTRIN) 800 MG tablet, Take 1 tablet by mouth Every 8 (Eight) Hours As Needed for Mild Pain ., Disp: 90 tablet, Rfl: 3  •  levothyroxine (Synthroid) 50 MCG tablet, Take 1 tablet by mouth Daily., Disp: 90 tablet, Rfl: 3  •  linaclotide (LINZESS) 290 MCG capsule capsule, Take 1 capsule by mouth Every Morning Before Breakfast., Disp: 90 capsule, Rfl: 3  •  montelukast (Singulair) 10 MG tablet, Take 1 tablet by mouth Every Night., Disp: 90 tablet, Rfl: 3  •  pantoprazole (PROTONIX) 40 MG EC tablet, Take 1 tablet by mouth Daily., Disp: 90 tablet, Rfl: 3  •  Plecanatide (Trulance) 3 MG tablet, Take 1 tablet by mouth Daily., Disp: 7 tablet, Rfl: 0  •  potassium chloride ER (K-TAB) 20 MEQ tablet controlled-release ER tablet, Take 1 tablet by mouth Daily As Needed (when take lasix)., Disp: 90 tablet, Rfl: 3  •  pseudoephedrine (SUDAFED) 60 MG tablet, Take 1 tablet by mouth Every 4 (Four) Hours As Needed for Congestion., Disp: 30 tablet, Rfl: 1  •  rOPINIRole (REQUIP) 2 MG tablet, Take 1 tablet by mouth Every Night. Take 1 hour before bedtime., Disp: 90 tablet, Rfl: 3  •  rosuvastatin (CRESTOR) 5 MG tablet, Take 1 tablet by mouth Every Night., Disp: 90 tablet, Rfl: 3  •  sodium chloride (Ocean Nasal Spray) 0.65 % nasal spray, 1 spray into the nostril(s) as directed by provider As Needed for Congestion.,  Disp: 1 each, Rfl: 12  •  traMADol ER (ULTRAM-ER) 100 MG 24 hr tablet, Take 2 tablets by mouth Daily., Disp: 60 tablet, Rfl: 2  •  triamcinolone (KENALOG) 0.1 % cream, Apply  topically to the appropriate area as directed 3 (Three) Times a Day. to affected area, Disp: 80 g, Rfl: 3  •  verapamil SR (CALAN-SR) 240 MG CR tablet, Take 1 tablet by mouth Every Night., Disp: 90 tablet, Rfl: 3  •  XIIDRA 5 % ophthalmic solution, INSTILL 1 DROP INTO EACH EYE BID, Disp: , Rfl:       Allergies:  Allergies   Allergen Reactions   • Sulfa Antibiotics Anaphylaxis   • Nickel Swelling   • Latex Rash   • Lisinopril Cough         Family History:  Family History   Problem Relation Age of Onset   • Breast cancer Neg Hx          Surgical History:  Past Surgical History:   Procedure Laterality Date   • CERVICAL EPIDURAL N/A 1/15/2020    Procedure: CERVICAL EPIDURAL C7/T1;  Surgeon: Tha Rutledge DO;  [facility];  Service: Pain Management   •  SECTION     • COLONOSCOPY     • ENDOSCOPY N/A 3/22/2018    Procedure: ESOPHAGOGASTRODUODENOSCOPY WITH ANESTHESIA;  Surgeon: Don Tovar MD;  [facility];  Service: Gastroenterology   • HYSTERECTOMY  1985    ovarian ca   • LUMBAR EPIDURAL INJECTION N/A 2019    Procedure: LUMBAR EPIDURAL 1ST VISIT L4/5;  Surgeon: Tha Rutledge DO;  [facility];  Service: Pain Management   • NECK SURGERY      C6-7 ACDF   • OVARY SURGERY     • MO LAP,CHOLECYSTECTOMY N/A 2016    Procedure: CHOLECYSTECTOMY LAPAROSCOPIC;  Surgeon: Don Tovar MD;  [facility];  Service: General   • WRIST SURGERY           Radiology:   XR Knee 3 View Right    Result Date: 2021    Mild medial compartment osteoarthritic change and joint space height loss.  This report was finalized on 2021 10:25 AM by Dr. Cameron Cedillo MD.        Examination:   Examination left shoulder demonstrates full mobility with minimal discomfort and limitation of motion right  shoulder with moderate signs of impingement with mild limitation of flexion.  She has full internal and external rotation.  Neurovascular examination is intact to both shoulders.    Knee examination shows mild effusion and moderate medial joint line tenderness motion is full no instability normal neurovascular status.      Assessment & Plan:   76 y.o. female presents in follow-up with bilateral shoulder complaints her right is worse and medically demonstrates moderate signs of impingement today she is provided steroid injection Depo-Medrol 80 mg lidocaine block subacromial space.  Right knee treated with intra-articular steroid injection Depo-Medrol 80 mg with lidocaine block she will follow-up as needed.         Diagnosis Plan   1. Rotator cuff tendinitis, right     2. Primary osteoarthritis of right knee           Large Joint Arthrocentesis: R knee  Date/Time: 5/26/2021 12:03 PM  Consent given by: patient  Site marked: site marked  Timeout: Immediately prior to procedure a time out was called to verify the correct patient, procedure, equipment, support staff and site/side marked as required   Supporting Documentation  Indications: pain   Procedure Details  Location: knee - R knee  Preparation: Patient was prepped and draped in the usual sterile fashion  Needle size: 25 G  Approach: anterolateral  Medications administered: 80 mg methylPREDNISolone acetate 80 MG/ML; 5 mL lidocaine 2%  Patient tolerance: patient tolerated the procedure well with no immediate complications    Large Joint Arthrocentesis: R subacromial bursa  Date/Time: 5/26/2021 12:03 PM  Consent given by: patient  Site marked: site marked  Timeout: Immediately prior to procedure a time out was called to verify the correct patient, procedure, equipment, support staff and site/side marked as required   Supporting Documentation  Indications: pain   Procedure Details  Location: shoulder - R subacromial bursa  Preparation: Patient was prepped and draped in  the usual sterile fashion  Needle size: 25 G  Approach: lateral  Medications administered: 5 mL lidocaine 2%; 80 mg methylPREDNISolone acetate 80 MG/ML  Patient tolerance: patient tolerated the procedure well with no immediate complications                  This document has been electronically signed by Omar Saenz MD   May 29, 2021 12:01 EDT

## 2021-05-29 PROBLEM — M17.11 PRIMARY OSTEOARTHRITIS OF RIGHT KNEE: Status: ACTIVE | Noted: 2021-05-29

## 2021-05-29 RX ORDER — METHYLPREDNISOLONE ACETATE 80 MG/ML
80 INJECTION, SUSPENSION INTRA-ARTICULAR; INTRALESIONAL; INTRAMUSCULAR; SOFT TISSUE
Status: COMPLETED | OUTPATIENT
Start: 2021-05-26 | End: 2021-05-26

## 2021-05-29 RX ORDER — LIDOCAINE HYDROCHLORIDE 20 MG/ML
5 INJECTION, SOLUTION INFILTRATION; PERINEURAL
Status: COMPLETED | OUTPATIENT
Start: 2021-05-26 | End: 2021-05-26

## 2022-02-09 ENCOUNTER — OFFICE VISIT (OUTPATIENT)
Dept: ORTHOPEDIC SURGERY | Facility: CLINIC | Age: 77
End: 2022-02-09

## 2022-02-09 VITALS — BODY MASS INDEX: 22.66 KG/M2 | HEIGHT: 66 IN | WEIGHT: 141 LBS

## 2022-02-09 DIAGNOSIS — M75.82 TENDINITIS OF LEFT ROTATOR CUFF: ICD-10-CM

## 2022-02-09 DIAGNOSIS — M75.81 ROTATOR CUFF TENDINITIS, RIGHT: Primary | ICD-10-CM

## 2022-02-09 PROCEDURE — 20610 DRAIN/INJ JOINT/BURSA W/O US: CPT | Performed by: ORTHOPAEDIC SURGERY

## 2022-02-09 RX ADMIN — METHYLPREDNISOLONE ACETATE 40 MG: 40 INJECTION, SUSPENSION INTRA-ARTICULAR; INTRALESIONAL; INTRAMUSCULAR; SOFT TISSUE at 13:55

## 2022-02-09 RX ADMIN — LIDOCAINE HYDROCHLORIDE 5 ML: 10 INJECTION, SOLUTION EPIDURAL; INFILTRATION; INTRACAUDAL; PERINEURAL at 14:00

## 2022-02-09 RX ADMIN — LIDOCAINE HYDROCHLORIDE 5 ML: 10 INJECTION, SOLUTION EPIDURAL; INFILTRATION; INTRACAUDAL; PERINEURAL at 13:55

## 2022-02-09 RX ADMIN — METHYLPREDNISOLONE ACETATE 40 MG: 40 INJECTION, SUSPENSION INTRA-ARTICULAR; INTRALESIONAL; INTRAMUSCULAR; SOFT TISSUE at 14:00

## 2022-02-09 NOTE — PROGRESS NOTES
Follow-up Visit         Patient: Tyler Cabello  YOB: 1945  Date of Encounter: 02/09/2022      Chief  Complaint:   Chief Complaint   Patient presents with   • Left Shoulder - Follow-up, Pain   • Right Shoulder - Follow-up, Pain   • Right Knee - Follow-up, Pain         HPI:  Tyler Cabello, 76 y.o. female presents complaints of bilateral shoulder pain she describes it as excruciating.  She last received subacromial steroid injections May 2021.  She reports good response for 4 months and is requesting repeat injections today.  She reports no additional trauma.        Medical History:  Patient Active Problem List   Diagnosis   • Gastroesophageal reflux disease with esophagitis   • Moderate persistent asthma with status asthmaticus   • Constipation   • Mixed hyperlipidemia   • Essential hypertension   • RLS (restless legs syndrome)   • Type 2 diabetes mellitus without complication (HCC)   • Dry scalp   • Acquired hypothyroidism   • Chronic pain of right knee   • Chronic pain of both knees   • Encounter for immunization   • DDD (degenerative disc disease), cervical   • Reactive depression   • Dyspareunia, female   • Mild episode of recurrent major depressive disorder (HCC)   • Chronic bilateral low back pain with bilateral sciatica   • Lumbar radiculopathy   • Cervical radiculitis   • Primary osteoarthritis of knees, bilateral   • Tendinitis of left rotator cuff   • Rotator cuff tendinitis, right   • Primary osteoarthritis of right knee     Past Medical History:   Diagnosis Date   • Acquired hypothyroidism 2/21/2018   • Allergic rhinitis    • Asthma     last asthma attack 2-3 month ago, SEASONAL ALLERGIES   • Chest pain     RELATED TO GALLBLADDER PER PT   • Constipation    • Cough    • DDD (degenerative disc disease), cervical 7/25/2018   • Diabetes mellitus (HCC)    • Elevated cholesterol    • GERD (gastroesophageal reflux disease)    • Hyperlipidemia    • Hypertension    • Malaise and fatigue    •  Menopause, premature    • Ovarian cancer (HCC) 1985   • PONV (postoperative nausea and vomiting)    • RLS (restless legs syndrome)    • Swelling, limb          Social History:  Social History     Socioeconomic History   • Marital status:      Spouse name: moiz   • Number of children: 2   • Years of education: 8   Tobacco Use   • Smoking status: Former Smoker     Packs/day: 0.50     Years: 25.00     Pack years: 12.50     Types: Cigarettes   • Smokeless tobacco: Former User     Quit date: 7/6/2008   • Tobacco comment: 25-30 YEARS   Vaping Use   • Vaping Use: Never used   Substance and Sexual Activity   • Alcohol use: No   • Drug use: No   • Sexual activity: Defer         Current Medications:    Current Outpatient Medications:   •  allopurinol (Zyloprim) 100 MG tablet, Take 1 tablet by mouth Daily., Disp: 30 tablet, Rfl: 5  •  baclofen (LIORESAL) 10 MG tablet, Take 1 tablet by mouth 3 (Three) Times a Day As Needed for Muscle Spasms., Disp: 90 tablet, Rfl: 3  •  cetirizine (zyrTEC) 10 MG tablet, Take 1 tablet by mouth 2 (Two) Times a Day., Disp: 180 tablet, Rfl: 3  •  cyclobenzaprine (FLEXERIL) 10 MG tablet, Take 1 tablet by mouth 3 (Three) Times a Day As Needed for Muscle Spasms., Disp: 180 tablet, Rfl: 3  •  diclofenac (VOLTAREN) 1 % gel gel, Apply 4 g topically to the appropriate area as directed 4 (Four) Times a Day As Needed (pain)., Disp: 4 tube, Rfl: 3  •  docusate sodium (COLACE) 100 MG capsule, Take 1 capsule by mouth 2 (Two) Times a Day., Disp: 180 capsule, Rfl: 4  •  FLUoxetine (PROzac) 20 MG capsule, Take 1 capsule by mouth Daily., Disp: 30 capsule, Rfl: 5  •  fluticasone (Flonase) 50 MCG/ACT nasal spray, Administer 2 sprays both nostrils once daily, Disp: 9.9 mL, Rfl: 3  •  furosemide (LASIX) 20 MG tablet, Take 1 tablet by mouth Daily As Needed (swelling)., Disp: 90 tablet, Rfl: 3  •  gabapentin (NEURONTIN) 100 MG capsule, 1 tablet twice daily, Disp: 60 capsule, Rfl: 2  •  HYDROcodone-acetaminophen  (NORCO) 7.5-325 MG per tablet, Take 1 tablet by mouth Every 6 (Six) Hours As Needed for Moderate Pain ., Disp: 60 tablet, Rfl: 0  •  ibuprofen (ADVIL,MOTRIN) 800 MG tablet, Take 1 tablet by mouth Every 8 (Eight) Hours As Needed for Mild Pain ., Disp: 90 tablet, Rfl: 3  •  levothyroxine (Synthroid) 50 MCG tablet, Take 1 tablet by mouth Daily., Disp: 90 tablet, Rfl: 3  •  linaclotide (LINZESS) 290 MCG capsule capsule, Take 1 capsule by mouth Every Morning Before Breakfast., Disp: 90 capsule, Rfl: 3  •  montelukast (Singulair) 10 MG tablet, Take 1 tablet by mouth Every Night., Disp: 90 tablet, Rfl: 3  •  pantoprazole (PROTONIX) 40 MG EC tablet, Take 1 tablet by mouth Daily., Disp: 90 tablet, Rfl: 3  •  Plecanatide (Trulance) 3 MG tablet, Take 1 tablet by mouth Daily., Disp: 7 tablet, Rfl: 0  •  potassium chloride ER (K-TAB) 20 MEQ tablet controlled-release ER tablet, Take 1 tablet by mouth Daily As Needed (when take lasix)., Disp: 90 tablet, Rfl: 3  •  pseudoephedrine (SUDAFED) 60 MG tablet, Take 1 tablet by mouth Every 4 (Four) Hours As Needed for Congestion., Disp: 30 tablet, Rfl: 1  •  rOPINIRole (REQUIP) 2 MG tablet, Take 1 tablet by mouth Every Night. Take 1 hour before bedtime., Disp: 90 tablet, Rfl: 3  •  rosuvastatin (CRESTOR) 5 MG tablet, Take 1 tablet by mouth Every Night., Disp: 90 tablet, Rfl: 3  •  sodium chloride (Ocean Nasal Spray) 0.65 % nasal spray, 1 spray into the nostril(s) as directed by provider As Needed for Congestion., Disp: 1 each, Rfl: 12  •  traMADol ER (ULTRAM-ER) 100 MG 24 hr tablet, Take 2 tablets by mouth Daily., Disp: 60 tablet, Rfl: 2  •  triamcinolone (KENALOG) 0.1 % cream, Apply  topically to the appropriate area as directed 3 (Three) Times a Day. to affected area, Disp: 80 g, Rfl: 3  •  verapamil SR (CALAN-SR) 240 MG CR tablet, Take 1 tablet by mouth Every Night., Disp: 90 tablet, Rfl: 3  •  XIIDRA 5 % ophthalmic solution, INSTILL 1 DROP INTO EACH EYE BID, Disp: , Rfl:        Allergies:  Allergies   Allergen Reactions   • Sulfa Antibiotics Anaphylaxis   • Nickel Swelling   • Latex Rash   • Lisinopril Cough         Family History:  Family History   Problem Relation Age of Onset   • Breast cancer Neg Hx          Surgical History:  Past Surgical History:   Procedure Laterality Date   • CERVICAL EPIDURAL N/A 1/15/2020    Procedure: CERVICAL EPIDURAL C7/T1;  Surgeon: Tha Rutledge DO;  Location: [facility] OR;  Service: Pain Management   •  SECTION     • COLONOSCOPY     • ENDOSCOPY N/A 3/22/2018    Procedure: ESOPHAGOGASTRODUODENOSCOPY WITH ANESTHESIA;  Surgeon: Don Tovar MD;  Location: [facility] OR;  Service: Gastroenterology   • HYSTERECTOMY  1985    ovarian ca   • LUMBAR EPIDURAL INJECTION N/A 2019    Procedure: LUMBAR EPIDURAL 1ST VISIT L4/5;  Surgeon: Tha Rutledge DO;  Location: [facility] OR;  Service: Pain Management   • NECK SURGERY      C6-7 ACDF   • OVARY SURGERY     • ME LAP,CHOLECYSTECTOMY N/A 2016    Procedure: CHOLECYSTECTOMY LAPAROSCOPIC;  Surgeon: Don Tovar MD;  Location: [facility];  Service: General   • WRIST SURGERY         Examination:   Examination bilateral shoulders reveals moderate impingement with mild discomfort with full internal and external rotation she has no gross instability no localized tenderness to the bicipital region bilaterally.  Strength with Jobes maneuver.        Assessment & Plan:   76 y.o. female presents follow-up rotator cuff tendinitis bilateral shoulders with good response in the past requesting same today she is given steroid injections Depo-Medrol 40 mg lidocaine block subacromial space bilateral shoulders.  She will follow-up as needed.         Diagnosis Plan   1. Rotator cuff tendinitis, right     2. Tendinitis of left rotator cuff           Large Joint Arthrocentesis: L subacromial bursa  Date/Time: 2022 1:55 PM  Consent given by: patient  Site marked: site marked  Timeout: Immediately prior  to procedure a time out was called to verify the correct patient, procedure, equipment, support staff and site/side marked as required   Supporting Documentation  Indications: pain   Procedure Details  Location: shoulder - L subacromial bursa  Preparation: Patient was prepped and draped in the usual sterile fashion  Needle size: 25 G  Approach: lateral  Medications administered: 5 mL lidocaine PF 1% 1 %; 40 mg methylPREDNISolone acetate 40 MG/ML  Patient tolerance: patient tolerated the procedure well with no immediate complications    Large Joint Arthrocentesis: R subacromial bursa  Date/Time: 2/9/2022 2:00 PM  Consent given by: patient  Site marked: site marked  Timeout: Immediately prior to procedure a time out was called to verify the correct patient, procedure, equipment, support staff and site/side marked as required   Supporting Documentation  Indications: pain   Procedure Details  Location: shoulder - R subacromial bursa  Preparation: Patient was prepped and draped in the usual sterile fashion  Needle size: 25 G  Approach: lateral  Medications administered: 5 mL lidocaine PF 1% 1 %; 40 mg methylPREDNISolone acetate 40 MG/ML  Patient tolerance: patient tolerated the procedure well with no immediate complications              Cc:  Miranda Bo APRN              This document has been electronically signed by Omar Saenz MD   February 10, 2022 10:13 EST

## 2022-02-11 RX ORDER — LIDOCAINE HYDROCHLORIDE 10 MG/ML
5 INJECTION, SOLUTION EPIDURAL; INFILTRATION; INTRACAUDAL; PERINEURAL
Status: COMPLETED | OUTPATIENT
Start: 2022-02-09 | End: 2022-02-09

## 2022-02-11 RX ORDER — METHYLPREDNISOLONE ACETATE 40 MG/ML
40 INJECTION, SUSPENSION INTRA-ARTICULAR; INTRALESIONAL; INTRAMUSCULAR; SOFT TISSUE
Status: COMPLETED | OUTPATIENT
Start: 2022-02-09 | End: 2022-02-09

## 2022-02-14 ENCOUNTER — OFFICE VISIT (OUTPATIENT)
Dept: ORTHOPEDIC SURGERY | Facility: CLINIC | Age: 77
End: 2022-02-14

## 2022-02-14 VITALS — HEIGHT: 66 IN | BODY MASS INDEX: 22.68 KG/M2 | WEIGHT: 141.09 LBS

## 2022-02-14 DIAGNOSIS — M17.0 PRIMARY OSTEOARTHRITIS OF KNEES, BILATERAL: Primary | ICD-10-CM

## 2022-02-14 PROCEDURE — 20610 DRAIN/INJ JOINT/BURSA W/O US: CPT | Performed by: ORTHOPAEDIC SURGERY

## 2022-02-14 RX ADMIN — LIDOCAINE HYDROCHLORIDE 5 ML: 20 INJECTION, SOLUTION INFILTRATION; PERINEURAL at 21:41

## 2022-02-14 RX ADMIN — TRIAMCINOLONE ACETONIDE 40 MG: 40 INJECTION, SUSPENSION INTRA-ARTICULAR; INTRAMUSCULAR at 21:42

## 2022-02-14 RX ADMIN — LIDOCAINE HYDROCHLORIDE 5 ML: 20 INJECTION, SOLUTION INFILTRATION; PERINEURAL at 21:42

## 2022-02-14 RX ADMIN — TRIAMCINOLONE ACETONIDE 40 MG: 40 INJECTION, SUSPENSION INTRA-ARTICULAR; INTRAMUSCULAR at 21:41

## 2022-02-14 NOTE — PROGRESS NOTES
Follow-up Visit         Patient: Tyler Cabello  YOB: 1945  Date of Encounter: 02/14/2022      Chief  Complaint:   Chief Complaint   Patient presents with   • Left Knee - Pain, Follow-up   • Right Knee - Pain, Follow-up         HPI:  Tyler Cabello, 76 y.o. female presents in follow-up bilateral knee osteoarthritis she was last treated with steroid injections January 8 of 2021.  She received relief of approximately 4 months.  She has been struggling with bilateral knee pain over the past 6 months and is requesting repeat injections.        Medical History:  Patient Active Problem List   Diagnosis   • Gastroesophageal reflux disease with esophagitis   • Moderate persistent asthma with status asthmaticus   • Constipation   • Mixed hyperlipidemia   • Essential hypertension   • RLS (restless legs syndrome)   • Type 2 diabetes mellitus without complication (HCC)   • Dry scalp   • Acquired hypothyroidism   • Chronic pain of right knee   • Chronic pain of both knees   • Encounter for immunization   • DDD (degenerative disc disease), cervical   • Reactive depression   • Dyspareunia, female   • Mild episode of recurrent major depressive disorder (HCC)   • Chronic bilateral low back pain with bilateral sciatica   • Lumbar radiculopathy   • Cervical radiculitis   • Primary osteoarthritis of knees, bilateral   • Tendinitis of left rotator cuff   • Rotator cuff tendinitis, right   • Primary osteoarthritis of right knee     Past Medical History:   Diagnosis Date   • Acquired hypothyroidism 2/21/2018   • Allergic rhinitis    • Asthma     last asthma attack 2-3 month ago, SEASONAL ALLERGIES   • Chest pain     RELATED TO GALLBLADDER PER PT   • Constipation    • Cough    • DDD (degenerative disc disease), cervical 7/25/2018   • Diabetes mellitus (HCC)    • Elevated cholesterol    • GERD (gastroesophageal reflux disease)    • Hyperlipidemia    • Hypertension    • Malaise and fatigue    • Menopause, premature    •  Ovarian cancer (HCC) 1985   • PONV (postoperative nausea and vomiting)    • RLS (restless legs syndrome)    • Swelling, limb          Social History:  Social History     Socioeconomic History   • Marital status:      Spouse name: moiz   • Number of children: 2   • Years of education: 8   Tobacco Use   • Smoking status: Former Smoker     Packs/day: 0.50     Years: 25.00     Pack years: 12.50     Types: Cigarettes   • Smokeless tobacco: Former User     Quit date: 7/6/2008   • Tobacco comment: 25-30 YEARS   Vaping Use   • Vaping Use: Never used   Substance and Sexual Activity   • Alcohol use: No   • Drug use: No   • Sexual activity: Defer         Current Medications:    Current Outpatient Medications:   •  allopurinol (Zyloprim) 100 MG tablet, Take 1 tablet by mouth Daily., Disp: 30 tablet, Rfl: 5  •  baclofen (LIORESAL) 10 MG tablet, Take 1 tablet by mouth 3 (Three) Times a Day As Needed for Muscle Spasms., Disp: 90 tablet, Rfl: 3  •  cetirizine (zyrTEC) 10 MG tablet, Take 1 tablet by mouth 2 (Two) Times a Day., Disp: 180 tablet, Rfl: 3  •  cyclobenzaprine (FLEXERIL) 10 MG tablet, Take 1 tablet by mouth 3 (Three) Times a Day As Needed for Muscle Spasms., Disp: 180 tablet, Rfl: 3  •  diclofenac (VOLTAREN) 1 % gel gel, Apply 4 g topically to the appropriate area as directed 4 (Four) Times a Day As Needed (pain)., Disp: 4 tube, Rfl: 3  •  docusate sodium (COLACE) 100 MG capsule, Take 1 capsule by mouth 2 (Two) Times a Day., Disp: 180 capsule, Rfl: 4  •  FLUoxetine (PROzac) 20 MG capsule, Take 1 capsule by mouth Daily., Disp: 30 capsule, Rfl: 5  •  fluticasone (Flonase) 50 MCG/ACT nasal spray, Administer 2 sprays both nostrils once daily, Disp: 9.9 mL, Rfl: 3  •  furosemide (LASIX) 20 MG tablet, Take 1 tablet by mouth Daily As Needed (swelling)., Disp: 90 tablet, Rfl: 3  •  gabapentin (NEURONTIN) 100 MG capsule, 1 tablet twice daily, Disp: 60 capsule, Rfl: 2  •  HYDROcodone-acetaminophen (NORCO) 7.5-325 MG per  tablet, Take 1 tablet by mouth Every 6 (Six) Hours As Needed for Moderate Pain ., Disp: 60 tablet, Rfl: 0  •  ibuprofen (ADVIL,MOTRIN) 800 MG tablet, Take 1 tablet by mouth Every 8 (Eight) Hours As Needed for Mild Pain ., Disp: 90 tablet, Rfl: 3  •  levothyroxine (Synthroid) 50 MCG tablet, Take 1 tablet by mouth Daily., Disp: 90 tablet, Rfl: 3  •  linaclotide (LINZESS) 290 MCG capsule capsule, Take 1 capsule by mouth Every Morning Before Breakfast., Disp: 90 capsule, Rfl: 3  •  montelukast (Singulair) 10 MG tablet, Take 1 tablet by mouth Every Night., Disp: 90 tablet, Rfl: 3  •  pantoprazole (PROTONIX) 40 MG EC tablet, Take 1 tablet by mouth Daily., Disp: 90 tablet, Rfl: 3  •  Plecanatide (Trulance) 3 MG tablet, Take 1 tablet by mouth Daily., Disp: 7 tablet, Rfl: 0  •  potassium chloride ER (K-TAB) 20 MEQ tablet controlled-release ER tablet, Take 1 tablet by mouth Daily As Needed (when take lasix)., Disp: 90 tablet, Rfl: 3  •  pseudoephedrine (SUDAFED) 60 MG tablet, Take 1 tablet by mouth Every 4 (Four) Hours As Needed for Congestion., Disp: 30 tablet, Rfl: 1  •  rOPINIRole (REQUIP) 2 MG tablet, Take 1 tablet by mouth Every Night. Take 1 hour before bedtime., Disp: 90 tablet, Rfl: 3  •  rosuvastatin (CRESTOR) 5 MG tablet, Take 1 tablet by mouth Every Night., Disp: 90 tablet, Rfl: 3  •  sodium chloride (Ocean Nasal Spray) 0.65 % nasal spray, 1 spray into the nostril(s) as directed by provider As Needed for Congestion., Disp: 1 each, Rfl: 12  •  traMADol ER (ULTRAM-ER) 100 MG 24 hr tablet, Take 2 tablets by mouth Daily., Disp: 60 tablet, Rfl: 2  •  triamcinolone (KENALOG) 0.1 % cream, Apply  topically to the appropriate area as directed 3 (Three) Times a Day. to affected area, Disp: 80 g, Rfl: 3  •  verapamil SR (CALAN-SR) 240 MG CR tablet, Take 1 tablet by mouth Every Night., Disp: 90 tablet, Rfl: 3  •  XIIDRA 5 % ophthalmic solution, INSTILL 1 DROP INTO EACH EYE BID, Disp: , Rfl:       Allergies:  Allergies    Allergen Reactions   • Sulfa Antibiotics Anaphylaxis   • Nickel Swelling   • Latex Rash   • Lisinopril Cough         Family History:  Family History   Problem Relation Age of Onset   • Breast cancer Neg Hx          Surgical History:  Past Surgical History:   Procedure Laterality Date   • CERVICAL EPIDURAL N/A 1/15/2020    Procedure: CERVICAL EPIDURAL C7/T1;  Surgeon: Tha Rutledge DO;  Location: [facility] OR;  Service: Pain Management   •  SECTION     • COLONOSCOPY     • ENDOSCOPY N/A 3/22/2018    Procedure: ESOPHAGOGASTRODUODENOSCOPY WITH ANESTHESIA;  Surgeon: Don Tovar MD;  Location: [facility] OR;  Service: Gastroenterology   • HYSTERECTOMY  1985    ovarian ca   • LUMBAR EPIDURAL INJECTION N/A 2019    Procedure: LUMBAR EPIDURAL 1ST VISIT L4/5;  Surgeon: Tha Rutledge DO;  Location: [facility] OR;  Service: Pain Management   • NECK SURGERY      C6-7 ACDF   • OVARY SURGERY     • CO LAP,CHOLECYSTECTOMY N/A 2016    Procedure: CHOLECYSTECTOMY LAPAROSCOPIC;  Surgeon: Don Tovar MD;  Location: [facility] OR;  Service: General   • WRIST SURGERY           Radiology:   Mammo Screening Digital Tomosynthesis Bilateral With CAD    Result Date: 2/15/2022    There is no mammographic evidence of malignancy.  Annual mammographic screening is recommended unless otherwise clinically indicated.   Patient was entered into automatic tracking software.  ASSESSMENT:   BI-RADS 2: Benign.  This report was finalized on 2/15/2022 12:32 PM by Dr. Cameron Cedillo MD.          Radiographs      Examination:   Examination bilateral knees reveals no effusion.  She has moderate medial joint line tenderness bilaterally demonstrates full motion without instability.        Assessment & Plan:   76 y.o. female presents with known osteoarthritis bilateral knees with good response to intra-articular steroid injections in the past today she is provided Kenalog 40 mg intra-articular with lidocaine block.  She will  follow-up as needed.         Diagnosis Plan   1. Primary osteoarthritis of knees, bilateral           Large Joint Arthrocentesis: R knee  Date/Time: 2/14/2022 9:41 PM  Consent given by: patient  Timeout: Immediately prior to procedure a time out was called to verify the correct patient, procedure, equipment, support staff and site/side marked as required   Supporting Documentation  Indications: pain   Procedure Details  Location: knee - R knee  Preparation: Patient was prepped and draped in the usual sterile fashion  Needle size: 25 G  Approach: anterolateral  Medications administered: 5 mL lidocaine 2%; 40 mg triamcinolone acetonide 40 MG/ML  Patient tolerance: patient tolerated the procedure well with no immediate complications    Large Joint Arthrocentesis: L knee  Date/Time: 2/14/2022 9:42 PM  Consent given by: patient  Timeout: Immediately prior to procedure a time out was called to verify the correct patient, procedure, equipment, support staff and site/side marked as required   Supporting Documentation  Indications: pain   Procedure Details  Location: knee - L knee  Preparation: Patient was prepped and draped in the usual sterile fashion  Needle size: 25 G  Approach: anterolateral  Medications administered: 5 mL lidocaine 2%; 40 mg triamcinolone acetonide 40 MG/ML  Patient tolerance: patient tolerated the procedure well with no immediate complications              Cc:  Miranda Bo APRN              This document has been electronically signed by Omar Saenz MD   February 15, 2022 21:40 EST

## 2022-02-15 ENCOUNTER — HOSPITAL ENCOUNTER (OUTPATIENT)
Dept: MAMMOGRAPHY | Facility: HOSPITAL | Age: 77
Discharge: HOME | End: 2022-02-15
Admitting: NURSE PRACTITIONER

## 2022-02-15 DIAGNOSIS — Z12.31 VISIT FOR SCREENING MAMMOGRAM: ICD-10-CM

## 2022-02-15 PROCEDURE — 77063 BREAST TOMOSYNTHESIS BI: CPT | Performed by: RADIOLOGY

## 2022-02-15 PROCEDURE — 77067 SCR MAMMO BI INCL CAD: CPT

## 2022-02-15 PROCEDURE — 77067 SCR MAMMO BI INCL CAD: CPT | Performed by: RADIOLOGY

## 2022-02-15 PROCEDURE — 77063 BREAST TOMOSYNTHESIS BI: CPT

## 2022-02-15 RX ORDER — TRIAMCINOLONE ACETONIDE 40 MG/ML
40 INJECTION, SUSPENSION INTRA-ARTICULAR; INTRAMUSCULAR
Status: COMPLETED | OUTPATIENT
Start: 2022-02-14 | End: 2022-02-14

## 2022-02-15 RX ORDER — LIDOCAINE HYDROCHLORIDE 20 MG/ML
5 INJECTION, SOLUTION INFILTRATION; PERINEURAL
Status: COMPLETED | OUTPATIENT
Start: 2022-02-14 | End: 2022-02-14

## 2022-02-16 ENCOUNTER — HOSPITAL ENCOUNTER (OUTPATIENT)
Dept: MAMMOGRAPHY | Facility: HOSPITAL | Age: 77
End: 2022-02-16

## 2022-04-18 PROBLEM — G89.4 CHRONIC PAIN SYNDROME: Status: ACTIVE | Noted: 2022-04-18

## 2022-04-18 NOTE — H&P (VIEW-ONLY)
[facility]         History and Physical    MRN: [id number]  Patient Name: Tyler Cabello  : 1945  Gender: female   Physician: Tha Rutledge DO  Date of Service: 2022    HPI: CHRONIC LOW BACK PAIN    History:    Past Medical History:   Diagnosis Date   • Acquired hypothyroidism 2018   • Allergic rhinitis    • Asthma     last asthma attack 2-3 month ago, SEASONAL ALLERGIES   • Chest pain     RELATED TO GALLBLADDER PER PT   • Constipation    • Cough    • DDD (degenerative disc disease), cervical 2018   • Diabetes mellitus (HCC)    • Elevated cholesterol    • GERD (gastroesophageal reflux disease)    • Hyperlipidemia    • Hypertension    • Malaise and fatigue    • Menopause, premature    • Ovarian cancer (HCC)    • PONV (postoperative nausea and vomiting)    • RLS (restless legs syndrome)    • Swelling, limb        Past Surgical History:   Procedure Laterality Date   • CERVICAL EPIDURAL N/A 1/15/2020    Procedure: CERVICAL EPIDURAL C7/T1;  Surgeon: Tha Rutledge DO;  Location: [facility];  Service: Pain Management   •  SECTION     • COLONOSCOPY     • ENDOSCOPY N/A 3/22/2018    Procedure: ESOPHAGOGASTRODUODENOSCOPY WITH ANESTHESIA;  Surgeon: Don Tovar MD;  Location: [facility] OR;  Service: Gastroenterology   • HYSTERECTOMY  1985    ovarian ca   • LUMBAR EPIDURAL INJECTION N/A 2019    Procedure: LUMBAR EPIDURAL 1ST VISIT L4/5;  Surgeon: Tha Rutledge DO;  Location: [facility] OR;  Service: Pain Management   • NECK SURGERY      C6-7 ACDF   • OVARY SURGERY     • LA LAP,CHOLECYSTECTOMY N/A 2016    Procedure: CHOLECYSTECTOMY LAPAROSCOPIC;  Surgeon: Don Tovar MD;  Location: [facility];  Service: General   • WRIST SURGERY         Social History     Socioeconomic History   • Marital status:      Spouse name: moiz   • Number of children: 2   • Years of education: 8   Tobacco Use   • Smoking status: Former Smoker     Packs/day: 0.50      Years: 25.00     Pack years: 12.50     Types: Cigarettes   • Smokeless tobacco: Former User     Quit date: 7/6/2008   • Tobacco comment: 25-30 YEARS   Vaping Use   • Vaping Use: Never used   Substance and Sexual Activity   • Alcohol use: No   • Drug use: No   • Sexual activity: Defer       Family History   Problem Relation Age of Onset   • Breast cancer Neg Hx        Prior to Admission Medications:  (Not in a hospital admission)      Allergies:  Allergies   Allergen Reactions   • Sulfa Antibiotics Anaphylaxis   • Nickel Swelling   • Latex Rash   • Lisinopril Cough        Vitals: BP: ()/()   Arterial Line BP: ()/()      Review of Systems:   Within Normal Limits Abnormal   HEENT [x]    []     Cardiovascular [x]   []     Gastrointestinal [x]   []     Genitourinary [x]   []     Neurologic [x]   []     Pulmonary [x]   []       Physical Exam:   Within Normal Limits Abnormal   Head, Neck, and Throat [x]    []     Heart [x]   []     Lungs [x]   []     Abdomen [x]   []     Extremities [x]   []     Mental Status [x]    []        Diagnosis: G89.4, M54.16    Plan: SPINAL CORD STIMULATOR INSERTION PHASE 2 (N/A)     [x]   H&P revealed no contraindication to planned surgery.     [x]   Labs (if ordered) have been reviewed and revealed no contraindications to planned surgery.    RISKS, BENEFITS, and ALTERNATIVES discussed with patient who would like to proceed.     Tha Rutledge,   4/18/2022

## 2022-04-22 ENCOUNTER — LAB (OUTPATIENT)
Dept: LAB | Facility: HOSPITAL | Age: 77
End: 2022-04-22

## 2022-04-22 DIAGNOSIS — G89.4 CHRONIC PAIN SYNDROME: ICD-10-CM

## 2022-04-22 DIAGNOSIS — M54.16 LUMBAR RADICULOPATHY: ICD-10-CM

## 2022-04-22 PROCEDURE — U0004 COV-19 TEST NON-CDC HGH THRU: HCPCS | Performed by: ANESTHESIOLOGY

## 2022-04-22 PROCEDURE — C9803 HOPD COVID-19 SPEC COLLECT: HCPCS

## 2022-04-23 LAB — SARS-COV-2 RNA PNL SPEC NAA+PROBE: NOT DETECTED

## 2022-04-25 ENCOUNTER — ANESTHESIA EVENT (OUTPATIENT)
Dept: PERIOP | Facility: HOSPITAL | Age: 77
End: 2022-04-25

## 2022-04-25 ENCOUNTER — APPOINTMENT (OUTPATIENT)
Dept: GENERAL RADIOLOGY | Facility: HOSPITAL | Age: 77
End: 2022-04-25

## 2022-04-25 ENCOUNTER — ANESTHESIA (OUTPATIENT)
Dept: PERIOP | Facility: HOSPITAL | Age: 77
End: 2022-04-25

## 2022-04-25 ENCOUNTER — HOSPITAL ENCOUNTER (OUTPATIENT)
Facility: HOSPITAL | Age: 77
Setting detail: HOSPITAL OUTPATIENT SURGERY
Discharge: HOME | End: 2022-04-25
Attending: ANESTHESIOLOGY | Admitting: ANESTHESIOLOGY

## 2022-04-25 VITALS
OXYGEN SATURATION: 98 % | DIASTOLIC BLOOD PRESSURE: 85 MMHG | TEMPERATURE: 98.2 F | SYSTOLIC BLOOD PRESSURE: 164 MMHG | RESPIRATION RATE: 20 BRPM | HEART RATE: 74 BPM | WEIGHT: 140 LBS | BODY MASS INDEX: 27.48 KG/M2 | HEIGHT: 60 IN

## 2022-04-25 LAB — GLUCOSE BLDC GLUCOMTR-MCNC: 118 MG/DL (ref 70–130)

## 2022-04-25 PROCEDURE — 76000 FLUOROSCOPY <1 HR PHYS/QHP: CPT

## 2022-04-25 PROCEDURE — 25010000002 FENTANYL CITRATE (PF) 50 MCG/ML SOLUTION: Performed by: NURSE ANESTHETIST, CERTIFIED REGISTERED

## 2022-04-25 PROCEDURE — 25010000002 VANCOMYCIN 1 G RECONSTITUTED SOLUTION: Performed by: ANESTHESIOLOGY

## 2022-04-25 PROCEDURE — 25010000002 PROPOFOL 10 MG/ML EMULSION: Performed by: NURSE ANESTHETIST, CERTIFIED REGISTERED

## 2022-04-25 PROCEDURE — 76000 FLUOROSCOPY <1 HR PHYS/QHP: CPT | Performed by: RADIOLOGY

## 2022-04-25 PROCEDURE — C1889 IMPLANT/INSERT DEVICE, NOC: HCPCS | Performed by: ANESTHESIOLOGY

## 2022-04-25 PROCEDURE — C1822 GEN, NEURO, HF, RECHG BAT: HCPCS | Performed by: ANESTHESIOLOGY

## 2022-04-25 PROCEDURE — 25010000002 ONDANSETRON PER 1 MG: Performed by: NURSE ANESTHETIST, CERTIFIED REGISTERED

## 2022-04-25 PROCEDURE — C1894 INTRO/SHEATH, NON-LASER: HCPCS | Performed by: ANESTHESIOLOGY

## 2022-04-25 PROCEDURE — 82962 GLUCOSE BLOOD TEST: CPT

## 2022-04-25 PROCEDURE — 25010000002 MIDAZOLAM PER 1 MG: Performed by: NURSE ANESTHETIST, CERTIFIED REGISTERED

## 2022-04-25 PROCEDURE — 25010000002 CEFAZOLIN PER 500 MG

## 2022-04-25 PROCEDURE — C1897 LEAD, NEUROSTIM TEST KIT: HCPCS | Performed by: ANESTHESIOLOGY

## 2022-04-25 DEVICE — BLUE LEAD KIT, 50CM WITH 5MM SPACING
Type: IMPLANTABLE DEVICE | Site: BACK | Status: FUNCTIONAL
Brand: NEVRO®

## 2022-04-25 DEVICE — KT IPG NEUROSTIM SENZA/OMNIA: Type: IMPLANTABLE DEVICE | Site: BACK | Status: FUNCTIONAL

## 2022-04-25 DEVICE — N300 LEAD ANCHOR KIT
Type: IMPLANTABLE DEVICE | Site: BACK | Status: FUNCTIONAL
Brand: NEVRO®

## 2022-04-25 RX ORDER — SODIUM CHLORIDE 0.9 % (FLUSH) 0.9 %
10 SYRINGE (ML) INJECTION EVERY 12 HOURS SCHEDULED
Status: DISCONTINUED | OUTPATIENT
Start: 2022-04-25 | End: 2022-04-25 | Stop reason: HOSPADM

## 2022-04-25 RX ORDER — DROPERIDOL 2.5 MG/ML
0.62 INJECTION, SOLUTION INTRAMUSCULAR; INTRAVENOUS ONCE AS NEEDED
Status: DISCONTINUED | OUTPATIENT
Start: 2022-04-25 | End: 2022-04-25 | Stop reason: HOSPADM

## 2022-04-25 RX ORDER — OXYCODONE HYDROCHLORIDE AND ACETAMINOPHEN 5; 325 MG/1; MG/1
1 TABLET ORAL ONCE AS NEEDED
Status: DISCONTINUED | OUTPATIENT
Start: 2022-04-25 | End: 2022-04-25 | Stop reason: HOSPADM

## 2022-04-25 RX ORDER — IPRATROPIUM BROMIDE AND ALBUTEROL SULFATE 2.5; .5 MG/3ML; MG/3ML
3 SOLUTION RESPIRATORY (INHALATION) ONCE AS NEEDED
Status: DISCONTINUED | OUTPATIENT
Start: 2022-04-25 | End: 2022-04-25 | Stop reason: HOSPADM

## 2022-04-25 RX ORDER — HYDROMORPHONE HYDROCHLORIDE 1 MG/ML
0.5 INJECTION, SOLUTION INTRAMUSCULAR; INTRAVENOUS; SUBCUTANEOUS
Status: DISCONTINUED | OUTPATIENT
Start: 2022-04-25 | End: 2022-04-25 | Stop reason: HOSPADM

## 2022-04-25 RX ORDER — SODIUM CHLORIDE 0.9 % (FLUSH) 0.9 %
10 SYRINGE (ML) INJECTION AS NEEDED
Status: DISCONTINUED | OUTPATIENT
Start: 2022-04-25 | End: 2022-04-25 | Stop reason: HOSPADM

## 2022-04-25 RX ORDER — MEPERIDINE HYDROCHLORIDE 25 MG/ML
12.5 INJECTION INTRAMUSCULAR; INTRAVENOUS; SUBCUTANEOUS
Status: DISCONTINUED | OUTPATIENT
Start: 2022-04-25 | End: 2022-04-25 | Stop reason: HOSPADM

## 2022-04-25 RX ORDER — MAGNESIUM HYDROXIDE 1200 MG/15ML
LIQUID ORAL AS NEEDED
Status: DISCONTINUED | OUTPATIENT
Start: 2022-04-25 | End: 2022-04-25 | Stop reason: HOSPADM

## 2022-04-25 RX ORDER — VANCOMYCIN HYDROCHLORIDE 1 G/20ML
INJECTION, POWDER, LYOPHILIZED, FOR SOLUTION INTRAVENOUS AS NEEDED
Status: DISCONTINUED | OUTPATIENT
Start: 2022-04-25 | End: 2022-04-25 | Stop reason: HOSPADM

## 2022-04-25 RX ORDER — FAMOTIDINE 10 MG/ML
INJECTION, SOLUTION INTRAVENOUS AS NEEDED
Status: DISCONTINUED | OUTPATIENT
Start: 2022-04-25 | End: 2022-04-25 | Stop reason: SURG

## 2022-04-25 RX ORDER — ONDANSETRON 2 MG/ML
INJECTION INTRAMUSCULAR; INTRAVENOUS AS NEEDED
Status: DISCONTINUED | OUTPATIENT
Start: 2022-04-25 | End: 2022-04-25 | Stop reason: SURG

## 2022-04-25 RX ORDER — ONDANSETRON 2 MG/ML
4 INJECTION INTRAMUSCULAR; INTRAVENOUS AS NEEDED
Status: DISCONTINUED | OUTPATIENT
Start: 2022-04-25 | End: 2022-04-25 | Stop reason: HOSPADM

## 2022-04-25 RX ORDER — FENTANYL CITRATE 50 UG/ML
50 INJECTION, SOLUTION INTRAMUSCULAR; INTRAVENOUS
Status: DISCONTINUED | OUTPATIENT
Start: 2022-04-25 | End: 2022-04-25 | Stop reason: HOSPADM

## 2022-04-25 RX ORDER — SODIUM CHLORIDE, SODIUM LACTATE, POTASSIUM CHLORIDE, CALCIUM CHLORIDE 600; 310; 30; 20 MG/100ML; MG/100ML; MG/100ML; MG/100ML
125 INJECTION, SOLUTION INTRAVENOUS ONCE
Status: COMPLETED | OUTPATIENT
Start: 2022-04-25 | End: 2022-04-25

## 2022-04-25 RX ORDER — SODIUM CHLORIDE, SODIUM LACTATE, POTASSIUM CHLORIDE, CALCIUM CHLORIDE 600; 310; 30; 20 MG/100ML; MG/100ML; MG/100ML; MG/100ML
100 INJECTION, SOLUTION INTRAVENOUS ONCE AS NEEDED
Status: DISCONTINUED | OUTPATIENT
Start: 2022-04-25 | End: 2022-04-25 | Stop reason: HOSPADM

## 2022-04-25 RX ORDER — MIDAZOLAM HYDROCHLORIDE 1 MG/ML
0.5 INJECTION INTRAMUSCULAR; INTRAVENOUS
Status: DISCONTINUED | OUTPATIENT
Start: 2022-04-25 | End: 2022-04-25 | Stop reason: HOSPADM

## 2022-04-25 RX ORDER — MIDAZOLAM HYDROCHLORIDE 1 MG/ML
INJECTION INTRAMUSCULAR; INTRAVENOUS AS NEEDED
Status: DISCONTINUED | OUTPATIENT
Start: 2022-04-25 | End: 2022-04-25 | Stop reason: SURG

## 2022-04-25 RX ORDER — FENTANYL CITRATE 50 UG/ML
INJECTION, SOLUTION INTRAMUSCULAR; INTRAVENOUS AS NEEDED
Status: DISCONTINUED | OUTPATIENT
Start: 2022-04-25 | End: 2022-04-25 | Stop reason: SURG

## 2022-04-25 RX ADMIN — SODIUM CHLORIDE, POTASSIUM CHLORIDE, SODIUM LACTATE AND CALCIUM CHLORIDE: 600; 310; 30; 20 INJECTION, SOLUTION INTRAVENOUS at 07:46

## 2022-04-25 RX ADMIN — MIDAZOLAM 2 MG: 1 INJECTION INTRAMUSCULAR; INTRAVENOUS at 07:52

## 2022-04-25 RX ADMIN — ONDANSETRON 4 MG: 2 INJECTION INTRAMUSCULAR; INTRAVENOUS at 07:52

## 2022-04-25 RX ADMIN — PROPOFOL 50 MCG/KG/MIN: 10 INJECTION, EMULSION INTRAVENOUS at 07:54

## 2022-04-25 RX ADMIN — FAMOTIDINE 20 MG: 10 INJECTION INTRAVENOUS at 07:52

## 2022-04-25 RX ADMIN — FENTANYL CITRATE 100 MCG: 50 INJECTION INTRAMUSCULAR; INTRAVENOUS at 07:52

## 2022-04-25 NOTE — OP NOTE
Procedure Note  Date of Procedure: 4/25/2022  Patient Name: Tyler Cabello  Patient MRN: [id number]  YOB: 1945     Preoperative Diagnosis: chronic low back pain     Postoperative Diagnosis: Same     Procedure Performed: Percutaneous spinal cord stimulator implantation     Surgeon: Tha Rutledge DO     Anesthesia:  Monitored Anesthesia Care (MAC)     Fluids: Please see anesthesia record    Estimated Blood Loss: None    Grafts & Implants: Spinal Cord Stimulator    Procedure Description:    The patient was taken to the operating suite, and placed in a prone position skin overlying the thoracic and lumbar region of the spine was cleaned with chlorhexidine solution and allowed to dry for approximately 2-3 minutes.  The area was then draped using sterile sheets, towels, and Ioban dressing to create a sterile field.  Sterile technique was maintained throughout the procedure.  Following this a total of 25 of local anesthetic was used to anesthetize the skin and soft tissues in the surgical area.  This was a  1:1 mixture of 0.5% Marcaine with epinephrine and 2% lidocaine plain.  Following this a small vertically oriented incision was made in the left lower lumbar paraspinal tissues.  Blunt dissection was carried out to the level of the prevertebral fascia.  Following this a 14-gauge Touhy needle was advanced through the incision in a left paramedian approach at the   T12/L1  ( insertion site)  intralaminar space.  The epidural space was positively identified using a loss of resistance technique to air.  Following this a nevro lead was advanced within the epidural space to lie at the T8 (vertebral level)  vertebral body slightly left of midline.  Next a second 14-gauge Touhy needle was advanced in a similar manner through the incision, again using a left paramedian approach at the T12\L1 ( insertion site)   intralaminar space.  Again loss-of-resistance technique was used to identify the epidural space and a  second nevro octrode lead was navigated to lie at the T9   slightly right of midline. Fluoroscopic examination was consistent with the trial location as well.  At this point, the needles were removed under live fluoroscopy,  leaving the leads in place.  Anchors were placed over each the leads and anchored and locked into place.  A 2-0 silk suture was used to secure the anchors to the prevertebral fascia through each eyelet on each of the anchors.  Following this, a horizontally oriented incision was made in the  Right flank  (location of pocket )  using blunt dissection and Bovie electrocautery.  A pocket was created in this area.  Next, using a tunneling device the leads were tunneled from the paramedian incision to the IPG pocket site.  The leads were then connected to a Nevro ( type of IPG)   IPG.  At this point impedance testing was conducted by Nevro representative and deemed to be appropriate.  Following this a generous amount of bacitracin and water were used to irrigate both incision sites. 0.5g vancomycin powder distributed among the sites as well.  The IPG was then placed in the IPG pocket.  Both incisions were closed using a 2-0 Vicryl suture in a simple interrupted fashion for deep layer closure and stainless steel staples for skin approximation.  The wounds were then dressed with a small piece of Telfa. 4 x 4. and Island dressing.  The patient was taken to the recovery area in stable condition with no complications.     CPT   63650 x2  [id number]  [id number]     Tha Rutledge,   4/25/2022

## 2022-04-25 NOTE — ANESTHESIA POSTPROCEDURE EVALUATION
Patient: Tyler Cabello    Procedure Summary     Date: 04/25/22 Room / Location: [facility] OR 08 /  [facility] OR    Anesthesia Start: 0746 Anesthesia Stop: 0901    Procedure: SPINAL CORD STIMULATOR INSERTION PHASE 2 (N/A Back) Diagnosis:       Chronic pain syndrome      Lumbar radiculopathy      (Chronic pain syndrome [G89.4])      (Lumbar radiculopathy [M54.16])    Surgeons: Tha Rutledge DO Provider: Terry Mendieta MD    Anesthesia Type: MAC ASA Status: 3          Anesthesia Type: MAC    Vitals  Vitals Value Taken Time   /85 04/25/22 0932   Temp 98.2 °F (36.8 °C) 04/25/22 0902   Pulse 74 04/25/22 0932   Resp 20 04/25/22 0932   SpO2 98 % 04/25/22 0932           Post Anesthesia Care and Evaluation    Patient location during evaluation: PHASE II  Patient participation: complete - patient participated  Level of consciousness: responsive to verbal stimuli  Pain score: 0  Pain management: adequate  Airway patency: patent  Anesthetic complications: No anesthetic complications  PONV Status: none  Cardiovascular status: hemodynamically stable  Respiratory status: room air  Hydration status: acceptable

## 2022-04-25 NOTE — ANESTHESIA PREPROCEDURE EVALUATION
Anesthesia Evaluation     Patient summary reviewed and Nursing notes reviewed   history of anesthetic complications: PONV  NPO Solid Status: > 8 hours  NPO Liquid Status: > 8 hours           Airway   Mallampati: II  TM distance: >3 FB  Neck ROM: full  Dental      Pulmonary     breath sounds clear to auscultation  (+) asthma,  Cardiovascular   Exercise tolerance: good (4-7 METS)    Rhythm: regular  Rate: normal    (+) hypertension, hyperlipidemia,       Neuro/Psych- negative ROS  GI/Hepatic/Renal/Endo    (+) obesity,  GERD,  diabetes mellitus, thyroid problem     Musculoskeletal     Abdominal   (+) obese,     Abdomen: soft.   Substance History      OB/GYN          Other   arthritis,    history of cancer (ovarian) remission                  Anesthesia Plan    ASA 3     MAC     intravenous induction     Anesthetic plan, all risks, benefits, and alternatives have been provided, discussed and informed consent has been obtained with: patient.  Use of blood products discussed with consented to blood products.   Plan discussed with CRNA.        CODE STATUS:

## 2022-08-03 ENCOUNTER — OFFICE VISIT (OUTPATIENT)
Dept: ORTHOPEDIC SURGERY | Facility: CLINIC | Age: 77
End: 2022-08-03

## 2022-08-03 ENCOUNTER — HOSPITAL ENCOUNTER (OUTPATIENT)
Dept: GENERAL RADIOLOGY | Facility: HOSPITAL | Age: 77
Discharge: HOME | End: 2022-08-03
Admitting: ORTHOPAEDIC SURGERY

## 2022-08-03 VITALS — HEIGHT: 60 IN | BODY MASS INDEX: 27.48 KG/M2 | WEIGHT: 140 LBS

## 2022-08-03 DIAGNOSIS — M17.0 PRIMARY OSTEOARTHRITIS OF KNEES, BILATERAL: Primary | ICD-10-CM

## 2022-08-03 DIAGNOSIS — M75.52 BILATERAL SHOULDER BURSITIS: ICD-10-CM

## 2022-08-03 DIAGNOSIS — M75.51 BILATERAL SHOULDER BURSITIS: ICD-10-CM

## 2022-08-03 PROCEDURE — 73562 X-RAY EXAM OF KNEE 3: CPT

## 2022-08-03 PROCEDURE — 99212 OFFICE O/P EST SF 10 MIN: CPT | Performed by: ORTHOPAEDIC SURGERY

## 2022-08-03 PROCEDURE — 20610 DRAIN/INJ JOINT/BURSA W/O US: CPT | Performed by: ORTHOPAEDIC SURGERY

## 2022-08-03 PROCEDURE — 73562 X-RAY EXAM OF KNEE 3: CPT | Performed by: RADIOLOGY

## 2022-08-03 RX ORDER — HYDROCODONE BITARTRATE AND ACETAMINOPHEN 10; 325 MG/1; MG/1
1 TABLET ORAL EVERY 6 HOURS PRN
COMMUNITY

## 2022-08-03 RX ADMIN — LIDOCAINE HYDROCHLORIDE 5 ML: 10 INJECTION, SOLUTION EPIDURAL; INFILTRATION; INTRACAUDAL; PERINEURAL at 19:40

## 2022-08-03 RX ADMIN — METHYLPREDNISOLONE ACETATE 40 MG: 40 INJECTION, SUSPENSION INTRA-ARTICULAR; INTRALESIONAL; INTRAMUSCULAR; SOFT TISSUE at 19:40

## 2022-08-03 NOTE — PROGRESS NOTES
Follow-up Visit         Patient: Tyler Cabello  YOB: 1945  Date of Encounter: 08/03/2022      Chief  Complaint:   Chief Complaint   Patient presents with   • Right Knee - Follow-up, Pain   • Left Knee - Follow-up, Pain   • Left Shoulder - Follow-up, Pain   • Right Shoulder - Pain         HPI:  Tyler Cabello, 77 y.o. female presents for evaluation of bilateral knee pain bilateral shoulder pain.  She presents requesting consideration for viscous injection both knees.  She has received intra-articular steroid injections in the past with fairly good response.  Her second complaint is bilateral shoulder pain.  She describes stiffness in both shoulders difficulty sleeping at night localizes pain to both shoulders right slightly worse than left.  She was last provided subacromial steroid injections February 2022.  She received relief until recently.  She denies new complaints of weakness or numbness to either shoulder.        Medical History:  Patient Active Problem List   Diagnosis   • Gastroesophageal reflux disease with esophagitis   • Moderate persistent asthma with status asthmaticus   • Constipation   • Mixed hyperlipidemia   • Essential hypertension   • RLS (restless legs syndrome)   • Type 2 diabetes mellitus without complication (HCC)   • Dry scalp   • Acquired hypothyroidism   • Chronic pain of right knee   • Chronic pain of both knees   • Encounter for immunization   • DDD (degenerative disc disease), cervical   • Reactive depression   • Dyspareunia, female   • Mild episode of recurrent major depressive disorder (HCC)   • Chronic bilateral low back pain with bilateral sciatica   • Lumbar radiculopathy   • Cervical radiculitis   • Primary osteoarthritis of knees, bilateral   • Tendinitis of left rotator cuff   • Rotator cuff tendinitis, right   • Primary osteoarthritis of right knee   • Chronic pain syndrome   • Bilateral shoulder bursitis     Past Medical History:   Diagnosis Date   • Acquired  hypothyroidism 2/21/2018   • Allergic rhinitis    • Asthma     last asthma attack 2-3 month ago, SEASONAL ALLERGIES   • Chest pain     RELATED TO GALLBLADDER PER PT   • Constipation    • Cough    • DDD (degenerative disc disease), cervical 7/25/2018   • Diabetes mellitus (HCC)    • Elevated cholesterol    • GERD (gastroesophageal reflux disease)    • Hyperlipidemia    • Hypertension    • Malaise and fatigue    • Menopause, premature    • Ovarian cancer (HCC) 1985   • PONV (postoperative nausea and vomiting)    • RLS (restless legs syndrome)    • Swelling, limb          Social History:  Social History     Socioeconomic History   • Marital status:      Spouse name: moiz   • Number of children: 2   • Years of education: 8   Tobacco Use   • Smoking status: Former Smoker     Packs/day: 0.50     Years: 25.00     Pack years: 12.50     Types: Cigarettes   • Smokeless tobacco: Former User     Quit date: 7/6/2008   • Tobacco comment: 25-30 YEARS   Vaping Use   • Vaping Use: Never used   Substance and Sexual Activity   • Alcohol use: No   • Drug use: No   • Sexual activity: Defer         Current Medications:    Current Outpatient Medications:   •  baclofen (LIORESAL) 10 MG tablet, Take 1 tablet by mouth 3 (Three) Times a Day As Needed for Muscle Spasms., Disp: 90 tablet, Rfl: 3  •  cetirizine (zyrTEC) 10 MG tablet, Take 1 tablet by mouth 2 (Two) Times a Day., Disp: 180 tablet, Rfl: 3  •  docusate sodium (COLACE) 100 MG capsule, Take 1 capsule by mouth 2 (Two) Times a Day., Disp: 180 capsule, Rfl: 4  •  FLUoxetine (PROzac) 20 MG capsule, Take 1 capsule by mouth Daily., Disp: 30 capsule, Rfl: 5  •  furosemide (LASIX) 20 MG tablet, Take 1 tablet by mouth Daily As Needed (swelling)., Disp: 90 tablet, Rfl: 3  •  gabapentin (NEURONTIN) 100 MG capsule, 1 tablet twice daily (Patient taking differently: Take 600 mg by mouth 3 (Three) Times a Day. 1 tablet twice daily), Disp: 60 capsule, Rfl: 2  •  HYDROcodone-acetaminophen  (NORCO)  MG per tablet, Take 1 tablet by mouth Every 6 (Six) Hours As Needed for Moderate Pain ., Disp: , Rfl:   •  ibuprofen (ADVIL,MOTRIN) 800 MG tablet, Take 1 tablet by mouth Every 8 (Eight) Hours As Needed for Mild Pain ., Disp: 90 tablet, Rfl: 3  •  levothyroxine (Synthroid) 50 MCG tablet, Take 1 tablet by mouth Daily., Disp: 90 tablet, Rfl: 3  •  linaclotide (LINZESS) 290 MCG capsule capsule, Take 1 capsule by mouth Every Morning Before Breakfast., Disp: 90 capsule, Rfl: 3  •  montelukast (Singulair) 10 MG tablet, Take 1 tablet by mouth Every Night., Disp: 90 tablet, Rfl: 3  •  pantoprazole (PROTONIX) 40 MG EC tablet, Take 1 tablet by mouth Daily., Disp: 90 tablet, Rfl: 3  •  Plecanatide (Trulance) 3 MG tablet, Take 1 tablet by mouth Daily., Disp: 7 tablet, Rfl: 0  •  rOPINIRole (REQUIP) 2 MG tablet, Take 1 tablet by mouth Every Night. Take 1 hour before bedtime., Disp: 90 tablet, Rfl: 3  •  rosuvastatin (CRESTOR) 5 MG tablet, Take 1 tablet by mouth Every Night., Disp: 90 tablet, Rfl: 3  •  verapamil SR (CALAN-SR) 240 MG CR tablet, Take 1 tablet by mouth Every Night., Disp: 90 tablet, Rfl: 3      Allergies:  Allergies   Allergen Reactions   • Sulfa Antibiotics Anaphylaxis   • Nickel Swelling   • Latex Rash   • Lisinopril Cough         Family History:  Family History   Problem Relation Age of Onset   • Breast cancer Neg Hx          Surgical History:  Past Surgical History:   Procedure Laterality Date   • CERVICAL EPIDURAL N/A 01/15/2020    Procedure: CERVICAL EPIDURAL C7/T1;  Surgeon: Tha Rutledge DO;  Location: [facility] OR;  Service: Pain Management   •  SECTION     • COLONOSCOPY     • ENDOSCOPY N/A 2018    Procedure: ESOPHAGOGASTRODUODENOSCOPY WITH ANESTHESIA;  Surgeon: Don Tovar MD;  Location: [facility] OR;  Service: Gastroenterology   • HYSTERECTOMY  1985    ovarian ca   • LUMBAR EPIDURAL INJECTION N/A 2019    Procedure: LUMBAR EPIDURAL 1ST VISIT L4/5;  Surgeon:  Tha Rutledge, DO;  [facility]:  [facility] OR;  Service: Pain Management   • NECK SURGERY      C6-7 ACDF   • OVARY SURGERY     • NV LAP,CHOLECYSTECTOMY N/A 07/08/2016    Procedure: CHOLECYSTECTOMY LAPAROSCOPIC;  Surgeon: Don Tovar MD;  Location:  [facility] OR;  Service: General   • SPINAL CORD STIMULATOR IMPLANT N/A 4/25/2022    Procedure: SPINAL CORD STIMULATOR INSERTION PHASE 2;  Surgeon: Tha Rutledge DO;  Location:  [facility] OR;  Service: Pain Management;  Laterality: N/A;   • WRIST SURGERY Right     CARPAL TUNNEL RELEASE         Radiology:   XR Knee 3 View Bilateral    Result Date: 8/3/2022  Mild osteoarthritis in the knees, radiographically more advanced on the right.  This report was finalized on 8/3/2022 3:05 PM by Dr. Ned Fam II, MD.          Radiographs lateral knees by my review show mild osteoarthritis right slightly worse than left.      Examination:   Examination bilateral knees demonstrates no effusion of significance.  She has full extension and flexion with moderate medial joint line tenderness no instability.  bilateral shoulder evaluation reveals mild limitation of motion with discomfort at full flexion right and left consistent with impingement.  She demonstrates good strength with Jobes maneuver localized tenderness to the bicipital groove right or left and no localized tenderness over the AC joint.      Assessment & Plan:   77 y.o. female presents with bilateral shoulder complaints consistent with subacromial bursitis.  She continues to struggle with pain difficulty sleeping today she is provided Depo-Medrol 40 mg lidocaine block bilateral shoulders subacromial space.  For her worsening bilateral knee complaints we will request bilateral viscous injections.  She will return provided she is approved.  If not we will consider Zilretta.         Diagnosis Plan   1. Primary osteoarthritis of knees, bilateral  XR Knee 3 View Bilateral   2. Bilateral shoulder bursitis           Large  Joint Arthrocentesis: L subacromial bursa  Date/Time: 8/3/2022 7:40 PM  Consent given by: patient  Site marked: site marked  Timeout: Immediately prior to procedure a time out was called to verify the correct patient, procedure, equipment, support staff and site/side marked as required   Supporting Documentation  Indications: pain   Procedure Details  Location: shoulder - L subacromial bursa  Preparation: Patient was prepped and draped in the usual sterile fashion  Needle size: 25 G  Approach: lateral  Medications administered: 5 mL lidocaine PF 1% 1 %; 40 mg methylPREDNISolone acetate 40 MG/ML  Patient tolerance: patient tolerated the procedure well with no immediate complications    Large Joint Arthrocentesis: R subacromial bursa  Date/Time: 8/3/2022 7:40 PM  Consent given by: patient  Site marked: site marked  Timeout: Immediately prior to procedure a time out was called to verify the correct patient, procedure, equipment, support staff and site/side marked as required   Supporting Documentation  Indications: pain   Procedure Details  Location: shoulder - R subacromial bursa  Preparation: Patient was prepped and draped in the usual sterile fashion  Needle size: 25 G  Approach: lateral  Medications administered: 5 mL lidocaine PF 1% 1 %; 40 mg methylPREDNISolone acetate 40 MG/ML  Patient tolerance: patient tolerated the procedure well with no immediate complications              Cc:  Miranda Bo APRN              This document has been electronically signed by Omar Saenz MD   August 4, 2022 19:38 EDT

## 2022-08-05 RX ORDER — LIDOCAINE HYDROCHLORIDE 10 MG/ML
5 INJECTION, SOLUTION EPIDURAL; INFILTRATION; INTRACAUDAL; PERINEURAL
Status: COMPLETED | OUTPATIENT
Start: 2022-08-03 | End: 2022-08-03

## 2022-08-05 RX ORDER — METHYLPREDNISOLONE ACETATE 40 MG/ML
40 INJECTION, SUSPENSION INTRA-ARTICULAR; INTRALESIONAL; INTRAMUSCULAR; SOFT TISSUE
Status: COMPLETED | OUTPATIENT
Start: 2022-08-03 | End: 2022-08-03

## 2022-08-24 ENCOUNTER — OFFICE VISIT (OUTPATIENT)
Dept: ORTHOPEDIC SURGERY | Facility: CLINIC | Age: 77
End: 2022-08-24

## 2022-08-24 VITALS — WEIGHT: 139.99 LBS | BODY MASS INDEX: 27.48 KG/M2 | HEIGHT: 60 IN

## 2022-08-24 DIAGNOSIS — M17.0 PRIMARY OSTEOARTHRITIS OF KNEES, BILATERAL: Primary | ICD-10-CM

## 2022-08-24 PROCEDURE — 20610 DRAIN/INJ JOINT/BURSA W/O US: CPT | Performed by: ORTHOPAEDIC SURGERY

## 2022-08-24 RX ADMIN — LIDOCAINE HYDROCHLORIDE 5 ML: 10 INJECTION, SOLUTION EPIDURAL; INFILTRATION; INTRACAUDAL; PERINEURAL at 14:18

## 2022-08-24 RX ADMIN — LIDOCAINE HYDROCHLORIDE 5 ML: 10 INJECTION, SOLUTION EPIDURAL; INFILTRATION; INTRACAUDAL; PERINEURAL at 14:19

## 2022-08-24 NOTE — PROGRESS NOTES
Follow-up Visit         Patient: Tyler Cabello  YOB: 1945  Date of Encounter: 08/24/2022      Chief  Complaint:   Chief Complaint   Patient presents with   • Left Knee - Pain, Follow-up, Osteoarthritis     Auth #[id number]    • Right Knee - Pain, Follow-up, Osteoarthritis     Auth #[id number]          HPI:  Tyler Cabello, 77 y.o. female presents in follow-up bilateral knee osteoarthritis she presents today for bilateral viscous injections she has been preapproved for Durolane.        Medical History:  Patient Active Problem List   Diagnosis   • Gastroesophageal reflux disease with esophagitis   • Moderate persistent asthma with status asthmaticus   • Constipation   • Mixed hyperlipidemia   • Essential hypertension   • RLS (restless legs syndrome)   • Type 2 diabetes mellitus without complication (HCC)   • Dry scalp   • Acquired hypothyroidism   • Chronic pain of right knee   • Chronic pain of both knees   • Encounter for immunization   • DDD (degenerative disc disease), cervical   • Reactive depression   • Dyspareunia, female   • Mild episode of recurrent major depressive disorder (HCC)   • Chronic bilateral low back pain with bilateral sciatica   • Lumbar radiculopathy   • Cervical radiculitis   • Primary osteoarthritis of knees, bilateral   • Tendinitis of left rotator cuff   • Rotator cuff tendinitis, right   • Primary osteoarthritis of right knee   • Chronic pain syndrome   • Bilateral shoulder bursitis     Past Medical History:   Diagnosis Date   • Acquired hypothyroidism 2/21/2018   • Allergic rhinitis    • Asthma     last asthma attack 2-3 month ago, SEASONAL ALLERGIES   • Chest pain     RELATED TO GALLBLADDER PER PT   • Constipation    • Cough    • DDD (degenerative disc disease), cervical 7/25/2018   • Diabetes mellitus (HCC)    • Elevated cholesterol    • GERD (gastroesophageal reflux disease)    • Hyperlipidemia    • Hypertension    • Malaise and fatigue    • Menopause, premature    •  Ovarian cancer (HCC) 1985   • PONV (postoperative nausea and vomiting)    • RLS (restless legs syndrome)    • Swelling, limb          Social History:  Social History     Socioeconomic History   • Marital status:      Spouse name: moiz   • Number of children: 2   • Years of education: 8   Tobacco Use   • Smoking status: Former Smoker     Packs/day: 0.50     Years: 25.00     Pack years: 12.50     Types: Cigarettes   • Smokeless tobacco: Former User     Quit date: 7/6/2008   • Tobacco comment: 25-30 YEARS   Vaping Use   • Vaping Use: Never used   Substance and Sexual Activity   • Alcohol use: No   • Drug use: No   • Sexual activity: Defer         Current Medications:    Current Outpatient Medications:   •  baclofen (LIORESAL) 10 MG tablet, Take 1 tablet by mouth 3 (Three) Times a Day As Needed for Muscle Spasms., Disp: 90 tablet, Rfl: 3  •  cetirizine (zyrTEC) 10 MG tablet, Take 1 tablet by mouth 2 (Two) Times a Day., Disp: 180 tablet, Rfl: 3  •  docusate sodium (COLACE) 100 MG capsule, Take 1 capsule by mouth 2 (Two) Times a Day., Disp: 180 capsule, Rfl: 4  •  FLUoxetine (PROzac) 20 MG capsule, Take 1 capsule by mouth Daily., Disp: 30 capsule, Rfl: 5  •  furosemide (LASIX) 20 MG tablet, Take 1 tablet by mouth Daily As Needed (swelling)., Disp: 90 tablet, Rfl: 3  •  gabapentin (NEURONTIN) 100 MG capsule, 1 tablet twice daily (Patient taking differently: Take 600 mg by mouth 3 (Three) Times a Day. 1 tablet twice daily), Disp: 60 capsule, Rfl: 2  •  HYDROcodone-acetaminophen (NORCO)  MG per tablet, Take 1 tablet by mouth Every 6 (Six) Hours As Needed for Moderate Pain ., Disp: , Rfl:   •  ibuprofen (ADVIL,MOTRIN) 800 MG tablet, Take 1 tablet by mouth Every 8 (Eight) Hours As Needed for Mild Pain ., Disp: 90 tablet, Rfl: 3  •  levothyroxine (Synthroid) 50 MCG tablet, Take 1 tablet by mouth Daily., Disp: 90 tablet, Rfl: 3  •  linaclotide (LINZESS) 290 MCG capsule capsule, Take 1 capsule by mouth Every Morning  Before Breakfast., Disp: 90 capsule, Rfl: 3  •  montelukast (Singulair) 10 MG tablet, Take 1 tablet by mouth Every Night., Disp: 90 tablet, Rfl: 3  •  pantoprazole (PROTONIX) 40 MG EC tablet, Take 1 tablet by mouth Daily., Disp: 90 tablet, Rfl: 3  •  Plecanatide (Trulance) 3 MG tablet, Take 1 tablet by mouth Daily., Disp: 7 tablet, Rfl: 0  •  rOPINIRole (REQUIP) 2 MG tablet, Take 1 tablet by mouth Every Night. Take 1 hour before bedtime., Disp: 90 tablet, Rfl: 3  •  rosuvastatin (CRESTOR) 5 MG tablet, Take 1 tablet by mouth Every Night., Disp: 90 tablet, Rfl: 3  •  verapamil SR (CALAN-SR) 240 MG CR tablet, Take 1 tablet by mouth Every Night., Disp: 90 tablet, Rfl: 3      Allergies:  Allergies   Allergen Reactions   • Sulfa Antibiotics Anaphylaxis   • Nickel Swelling   • Latex Rash   • Lisinopril Cough         Family History:  Family History   Problem Relation Age of Onset   • Breast cancer Neg Hx          Surgical History:  Past Surgical History:   Procedure Laterality Date   • CERVICAL EPIDURAL N/A 01/15/2020    Procedure: CERVICAL EPIDURAL C7/T1;  Surgeon: Tha Rutledge DO;  Location: [facility];  Service: Pain Management   •  SECTION     • COLONOSCOPY     • ENDOSCOPY N/A 2018    Procedure: ESOPHAGOGASTRODUODENOSCOPY WITH ANESTHESIA;  Surgeon: Don Tovar MD;  Location: [facility] OR;  Service: Gastroenterology   • HYSTERECTOMY  1985    ovarian ca   • LUMBAR EPIDURAL INJECTION N/A 2019    Procedure: LUMBAR EPIDURAL 1ST VISIT L4/5;  Surgeon: Tha Rutledge DO;  Location: [facility] OR;  Service: Pain Management   • NECK SURGERY      C6-7 ACDF   • OVARY SURGERY     • MN LAP,CHOLECYSTECTOMY N/A 2016    Procedure: CHOLECYSTECTOMY LAPAROSCOPIC;  Surgeon: Don Tovar MD;  Location: [facility] OR;  Service: General   • SPINAL CORD STIMULATOR IMPLANT N/A 2022    Procedure: SPINAL CORD STIMULATOR INSERTION PHASE 2;  Surgeon: Tha Rutledge DO;  Location: [facility] OR;   Service: Pain Management;  Laterality: N/A;   • WRIST SURGERY Right     CARPAL TUNNEL RELEASE       Radiology:   XR Knee 3 View Bilateral    Result Date: 8/3/2022  Mild osteoarthritis in the knees, radiographically more advanced on the right.  This report was finalized on 8/3/2022 3:05 PM by Dr. Ned Fam II, MD.        Examination:   Examination bilateral knees reveals minimal effusion moderate medial joint line tenderness motion is full.        Assessment & Plan:   77 y.o. female presents follow-up bilateral knee osteoarthritis she has been approved for Durolane. Today she is provided Durolane intra-articular with lidocaine block bilateral knees she will follow-up as needed.         Diagnosis Plan   1. Primary osteoarthritis of knees, bilateral           Large Joint Arthrocentesis: L knee  Date/Time: 8/24/2022 2:18 PM  Consent given by: patient  Site marked: site marked  Timeout: Immediately prior to procedure a time out was called to verify the correct patient, procedure, equipment, support staff and site/side marked as required   Supporting Documentation  Indications: pain   Procedure Details  Location: knee - L knee  Preparation: Patient was prepped and draped in the usual sterile fashion  Needle size: 20 G  Approach: anterolateral  Medications administered: 5 mL lidocaine PF 1% 1 %; 60 mg Sodium Hyaluronate 60 MG/3ML  Patient tolerance: patient tolerated the procedure well with no immediate complications    Large Joint Arthrocentesis: R knee  Date/Time: 8/24/2022 2:19 PM  Consent given by: patient  Site marked: site marked  Timeout: Immediately prior to procedure a time out was called to verify the correct patient, procedure, equipment, support staff and site/side marked as required   Supporting Documentation  Indications: pain   Procedure Details  Location: knee - R knee  Preparation: Patient was prepped and draped in the usual sterile fashion  Needle size: 20 G  Approach: anterolateral  Medications  administered: 5 mL lidocaine PF 1% 1 %; 60 mg Sodium Hyaluronate 60 MG/3ML  Patient tolerance: patient tolerated the procedure well with no immediate complications              Cc:  Miranda Bo, ZHOU              This document has been electronically signed by Omar Saenz MD   August 25, 2022 14:17 EDT

## 2022-08-26 RX ORDER — LIDOCAINE HYDROCHLORIDE 10 MG/ML
5 INJECTION, SOLUTION EPIDURAL; INFILTRATION; INTRACAUDAL; PERINEURAL
Status: COMPLETED | OUTPATIENT
Start: 2022-08-24 | End: 2022-08-24

## 2022-09-27 ENCOUNTER — TRANSCRIBE ORDERS (OUTPATIENT)
Dept: ADMINISTRATIVE | Facility: HOSPITAL | Age: 77
End: 2022-09-27

## 2022-09-27 ENCOUNTER — HOSPITAL ENCOUNTER (OUTPATIENT)
Dept: GENERAL RADIOLOGY | Facility: HOSPITAL | Age: 77
Discharge: HOME | End: 2022-09-27

## 2022-09-27 DIAGNOSIS — M25.571 PAIN, JOINT, ANKLE AND FOOT, RIGHT: Primary | ICD-10-CM

## 2022-09-27 DIAGNOSIS — M25.571 PAIN, JOINT, ANKLE AND FOOT, RIGHT: ICD-10-CM

## 2022-09-27 PROCEDURE — 73610 X-RAY EXAM OF ANKLE: CPT

## 2022-09-27 PROCEDURE — 73610 X-RAY EXAM OF ANKLE: CPT | Performed by: RADIOLOGY

## 2023-01-23 ENCOUNTER — OFFICE VISIT (OUTPATIENT)
Dept: ORTHOPEDIC SURGERY | Facility: CLINIC | Age: 78
End: 2023-01-23
Payer: MEDICARE

## 2023-01-23 VITALS — WEIGHT: 139 LBS | HEIGHT: 60 IN | BODY MASS INDEX: 27.29 KG/M2

## 2023-01-23 DIAGNOSIS — M17.0 PRIMARY OSTEOARTHRITIS OF KNEES, BILATERAL: ICD-10-CM

## 2023-01-23 DIAGNOSIS — M75.52 BILATERAL SHOULDER BURSITIS: Primary | ICD-10-CM

## 2023-01-23 DIAGNOSIS — M75.51 BILATERAL SHOULDER BURSITIS: Primary | ICD-10-CM

## 2023-01-23 PROCEDURE — 20610 DRAIN/INJ JOINT/BURSA W/O US: CPT | Performed by: ORTHOPAEDIC SURGERY

## 2023-01-23 RX ADMIN — LIDOCAINE HYDROCHLORIDE 3 ML: 10 INJECTION, SOLUTION EPIDURAL; INFILTRATION; INTRACAUDAL; PERINEURAL at 12:22

## 2023-01-23 RX ADMIN — METHYLPREDNISOLONE ACETATE 40 MG: 40 INJECTION, SUSPENSION INTRA-ARTICULAR; INTRALESIONAL; INTRAMUSCULAR; SOFT TISSUE at 12:23

## 2023-01-23 RX ADMIN — LIDOCAINE HYDROCHLORIDE 3 ML: 10 INJECTION, SOLUTION EPIDURAL; INFILTRATION; INTRACAUDAL; PERINEURAL at 12:24

## 2023-01-23 RX ADMIN — METHYLPREDNISOLONE ACETATE 40 MG: 40 INJECTION, SUSPENSION INTRA-ARTICULAR; INTRALESIONAL; INTRAMUSCULAR; SOFT TISSUE at 12:24

## 2023-01-23 RX ADMIN — METHYLPREDNISOLONE ACETATE 40 MG: 40 INJECTION, SUSPENSION INTRA-ARTICULAR; INTRALESIONAL; INTRAMUSCULAR; SOFT TISSUE at 12:22

## 2023-01-23 RX ADMIN — LIDOCAINE HYDROCHLORIDE 3 ML: 10 INJECTION, SOLUTION EPIDURAL; INFILTRATION; INTRACAUDAL; PERINEURAL at 12:23

## 2023-01-23 NOTE — PROGRESS NOTES
Follow-up Visit         Patient: Tyler Cabello  YOB: 1945  Date of Encounter: 01/23/2023      Chief  Complaint:   Chief Complaint   Patient presents with   • Right Shoulder - Follow-up, Edema, Pain   • Left Shoulder - Follow-up, Edema, Pain   • Left Knee - Follow-up, Pain   • Right Knee - Follow-up, Pain         HPI:  Tyler Cabello, 77 y.o. female presents in follow-up bilateral knee pain and bilateral shoulder pain.  She has received subacromial steroid injections bilateral shoulders approximately 6 months ago with good response.  She was provided Durolane injections bilateral knees August 24, 2022 reports it did response she has previously received intra-articular steroid injections with good response.  She presents today requesting steroid injections to both shoulders and both knees.  Her medical history includes type 2 diabetes and chronic pain syndrome.        Medical History:  Patient Active Problem List   Diagnosis   • Gastroesophageal reflux disease with esophagitis   • Moderate persistent asthma with status asthmaticus   • Constipation   • Mixed hyperlipidemia   • Essential hypertension   • RLS (restless legs syndrome)   • Type 2 diabetes mellitus without complication (HCC)   • Dry scalp   • Acquired hypothyroidism   • Chronic pain of right knee   • Chronic pain of both knees   • Encounter for immunization   • DDD (degenerative disc disease), cervical   • Reactive depression   • Dyspareunia, female   • Mild episode of recurrent major depressive disorder (HCC)   • Chronic bilateral low back pain with bilateral sciatica   • Lumbar radiculopathy   • Cervical radiculitis   • Primary osteoarthritis of knees, bilateral   • Tendinitis of left rotator cuff   • Rotator cuff tendinitis, right   • Primary osteoarthritis of right knee   • Chronic pain syndrome   • Bilateral shoulder bursitis     Past Medical History:   Diagnosis Date   • Acquired hypothyroidism 2/21/2018   • Allergic rhinitis    •  Asthma     last asthma attack 2-3 month ago, SEASONAL ALLERGIES   • Chest pain     RELATED TO GALLBLADDER PER PT   • Constipation    • Cough    • DDD (degenerative disc disease), cervical 7/25/2018   • Diabetes mellitus (HCC)    • Elevated cholesterol    • GERD (gastroesophageal reflux disease)    • Hyperlipidemia    • Hypertension    • Malaise and fatigue    • Menopause, premature    • Ovarian cancer (HCC) 1985   • PONV (postoperative nausea and vomiting)    • RLS (restless legs syndrome)    • Swelling, limb          Social History:  Social History     Socioeconomic History   • Marital status:      Spouse name: moiz   • Number of children: 2   • Years of education: 8   Tobacco Use   • Smoking status: Former     Packs/day: 0.50     Years: 25.00     Pack years: 12.50     Types: Cigarettes   • Smokeless tobacco: Former     Quit date: 7/6/2008   • Tobacco comments:     25-30 YEARS   Vaping Use   • Vaping Use: Never used   Substance and Sexual Activity   • Alcohol use: No   • Drug use: No   • Sexual activity: Defer         Current Medications:    Current Outpatient Medications:   •  baclofen (LIORESAL) 10 MG tablet, Take 1 tablet by mouth 3 (Three) Times a Day As Needed for Muscle Spasms., Disp: 90 tablet, Rfl: 3  •  cetirizine (zyrTEC) 10 MG tablet, Take 1 tablet by mouth 2 (Two) Times a Day., Disp: 180 tablet, Rfl: 3  •  docusate sodium (COLACE) 100 MG capsule, Take 1 capsule by mouth 2 (Two) Times a Day., Disp: 180 capsule, Rfl: 4  •  FLUoxetine (PROzac) 20 MG capsule, Take 1 capsule by mouth Daily., Disp: 30 capsule, Rfl: 5  •  furosemide (LASIX) 20 MG tablet, Take 1 tablet by mouth Daily As Needed (swelling)., Disp: 90 tablet, Rfl: 3  •  gabapentin (NEURONTIN) 100 MG capsule, 1 tablet twice daily (Patient taking differently: Take 600 mg by mouth 3 (Three) Times a Day. 1 tablet twice daily), Disp: 60 capsule, Rfl: 2  •  HYDROcodone-acetaminophen (NORCO)  MG per tablet, Take 1 tablet by mouth Every 6  (Six) Hours As Needed for Moderate Pain ., Disp: , Rfl:   •  ibuprofen (ADVIL,MOTRIN) 800 MG tablet, Take 1 tablet by mouth Every 8 (Eight) Hours As Needed for Mild Pain ., Disp: 90 tablet, Rfl: 3  •  levothyroxine (Synthroid) 50 MCG tablet, Take 1 tablet by mouth Daily., Disp: 90 tablet, Rfl: 3  •  linaclotide (LINZESS) 290 MCG capsule capsule, Take 1 capsule by mouth Every Morning Before Breakfast., Disp: 90 capsule, Rfl: 3  •  montelukast (Singulair) 10 MG tablet, Take 1 tablet by mouth Every Night., Disp: 90 tablet, Rfl: 3  •  pantoprazole (PROTONIX) 40 MG EC tablet, Take 1 tablet by mouth Daily., Disp: 90 tablet, Rfl: 3  •  Plecanatide (Trulance) 3 MG tablet, Take 1 tablet by mouth Daily., Disp: 7 tablet, Rfl: 0  •  rOPINIRole (REQUIP) 2 MG tablet, Take 1 tablet by mouth Every Night. Take 1 hour before bedtime., Disp: 90 tablet, Rfl: 3  •  rosuvastatin (CRESTOR) 5 MG tablet, Take 1 tablet by mouth Every Night., Disp: 90 tablet, Rfl: 3  •  verapamil SR (CALAN-SR) 240 MG CR tablet, Take 1 tablet by mouth Every Night., Disp: 90 tablet, Rfl: 3      Allergies:  Allergies   Allergen Reactions   • Sulfa Antibiotics Anaphylaxis   • Nickel Swelling   • Latex Rash   • Lisinopril Cough         Family History:  Family History   Problem Relation Age of Onset   • Breast cancer Neg Hx          Surgical History:  Past Surgical History:   Procedure Laterality Date   • CERVICAL EPIDURAL N/A 01/15/2020    Procedure: CERVICAL EPIDURAL C7/T1;  Surgeon: Tha Rutledge DO;  Location: [facility] OR;  Service: Pain Management   •  SECTION     • COLONOSCOPY     • ENDOSCOPY N/A 2018    Procedure: ESOPHAGOGASTRODUODENOSCOPY WITH ANESTHESIA;  Surgeon: Don Tovar MD;  Location: [facility] OR;  Service: Gastroenterology   • HYSTERECTOMY  1985    ovarian ca   • LUMBAR EPIDURAL INJECTION N/A 2019    Procedure: LUMBAR EPIDURAL 1ST VISIT L4/5;  Surgeon: Tha Rutledge DO;  Location: [facility] OR;  Service: Pain  Management   • NECK SURGERY      C6-7 ACDF   • OVARY SURGERY     • [place] LAPAROSCOPY SURG CHOLECYSTECTOMY N/A 07/08/2016    Procedure: CHOLECYSTECTOMY LAPAROSCOPIC;  Surgeon: Don Tovar MD;  Location: [facility];  Service: General   • SPINAL CORD STIMULATOR IMPLANT N/A 4/25/2022    Procedure: SPINAL CORD STIMULATOR INSERTION PHASE 2;  Surgeon: Tha Rutledge DO;  Location: [facility];  Service: Pain Management;  Laterality: N/A;   • WRIST SURGERY Right     CARPAL TUNNEL RELEASE       Orthopedic Examination:   Bilateral shoulder evaluation reveals good strength with Jobes maneuver moderate signs of impingement left shoulder mild signs of impingement right shoulder full internal and external rotation bilaterally neurovascular exam grossly intact.    Bilateral knee evaluation demonstrates flexion and extension with no instability mild effusion right knee minimal to the left.  Moderate medial joint line tenderness bilaterally.      Assessment & Plan:   77 y.o. female presents bilateral shoulder pain with good response to subacromial steroid injection in the past today she is provided Depo-Medrol 40 mg lidocaine block subacromial space bilateral shoulders.  For her bilateral knee complaints with known osteoarthritis she is provided intra-articular steroid injection Depo-Medrol 40 mg lidocaine block aspirating 10 cc serous fluid left knee.  She will return as needed.         Diagnosis Plan   1. Bilateral shoulder bursitis        2. Primary osteoarthritis of knees, bilateral              Large Joint Arthrocentesis: L knee  Date/Time: 1/23/2023 12:22 PM  Consent given by: patient  Site marked: site marked  Timeout: Immediately prior to procedure a time out was called to verify the correct patient, procedure, equipment, support staff and site/side marked as required   Supporting Documentation  Indications: pain   Procedure Details  Location: knee - L knee  Preparation: Patient was prepped and draped in the usual  sterile fashion  Needle size: 25 G  Approach: anterolateral  Medications administered: 40 mg methylPREDNISolone acetate 40 MG/ML; 3 mL lidocaine PF 1% 1 %  Patient tolerance: patient tolerated the procedure well with no immediate complications    Large Joint Arthrocentesis: R knee  Date/Time: 1/23/2023 12:23 PM  Consent given by: patient  Site marked: site marked  Timeout: Immediately prior to procedure a time out was called to verify the correct patient, procedure, equipment, support staff and site/side marked as required   Supporting Documentation  Indications: pain   Procedure Details  Location: knee - R knee  Preparation: Patient was prepped and draped in the usual sterile fashion  Needle size: 25 G  Approach: anterolateral  Medications administered: 40 mg methylPREDNISolone acetate 40 MG/ML; 3 mL lidocaine PF 1% 1 %  Patient tolerance: patient tolerated the procedure well with no immediate complications    Large Joint Arthrocentesis: L subacromial bursa  Date/Time: 1/23/2023 12:23 PM  Consent given by: patient  Site marked: site marked  Timeout: Immediately prior to procedure a time out was called to verify the correct patient, procedure, equipment, support staff and site/side marked as required   Supporting Documentation  Indications: pain   Procedure Details  Location: shoulder - L subacromial bursa  Preparation: Patient was prepped and draped in the usual sterile fashion  Needle size: 25 G  Approach: lateral  Medications administered: 40 mg methylPREDNISolone acetate 40 MG/ML; 3 mL lidocaine PF 1% 1 %  Patient tolerance: patient tolerated the procedure well with no immediate complications    Large Joint Arthrocentesis: R subacromial bursa  Date/Time: 1/23/2023 12:24 PM  Consent given by: patient  Site marked: site marked  Timeout: Immediately prior to procedure a time out was called to verify the correct patient, procedure, equipment, support staff and site/side marked as required   Supporting  Documentation  Indications: pain   Procedure Details  Location: shoulder - R subacromial bursa  Preparation: Patient was prepped and draped in the usual sterile fashion  Needle size: 25 G  Approach: lateral  Medications administered: 40 mg methylPREDNISolone acetate 40 MG/ML; 3 mL lidocaine PF 1% 1 %  Patient tolerance: patient tolerated the procedure well with no immediate complications              Cc:  Miranda Bo APRN              This document has been electronically signed by Omar Sanez MD   January 26, 2023 12:21 EST

## 2023-01-27 RX ORDER — METHYLPREDNISOLONE ACETATE 40 MG/ML
40 INJECTION, SUSPENSION INTRA-ARTICULAR; INTRALESIONAL; INTRAMUSCULAR; SOFT TISSUE
Status: COMPLETED | OUTPATIENT
Start: 2023-01-23 | End: 2023-01-23

## 2023-01-27 RX ORDER — LIDOCAINE HYDROCHLORIDE 10 MG/ML
3 INJECTION, SOLUTION EPIDURAL; INFILTRATION; INTRACAUDAL; PERINEURAL
Status: COMPLETED | OUTPATIENT
Start: 2023-01-23 | End: 2023-01-23

## 2023-04-03 ENCOUNTER — TRANSCRIBE ORDERS (OUTPATIENT)
Dept: ADMINISTRATIVE | Facility: HOSPITAL | Age: 78
End: 2023-04-03
Payer: MEDICARE

## 2023-04-03 DIAGNOSIS — Z12.31 VISIT FOR SCREENING MAMMOGRAM: Primary | ICD-10-CM

## 2023-05-01 ENCOUNTER — HOSPITAL ENCOUNTER (OUTPATIENT)
Dept: MAMMOGRAPHY | Facility: HOSPITAL | Age: 78
Discharge: HOME | End: 2023-05-01
Admitting: NURSE PRACTITIONER
Payer: MEDICARE

## 2023-05-01 DIAGNOSIS — Z12.31 VISIT FOR SCREENING MAMMOGRAM: ICD-10-CM

## 2023-05-01 PROCEDURE — 77063 BREAST TOMOSYNTHESIS BI: CPT | Performed by: RADIOLOGY

## 2023-05-01 PROCEDURE — 77067 SCR MAMMO BI INCL CAD: CPT | Performed by: RADIOLOGY

## 2023-05-01 PROCEDURE — 77063 BREAST TOMOSYNTHESIS BI: CPT

## 2023-05-01 PROCEDURE — 77067 SCR MAMMO BI INCL CAD: CPT

## 2024-04-12 ENCOUNTER — TRANSCRIBE ORDERS (OUTPATIENT)
Dept: ADMINISTRATIVE | Facility: HOSPITAL | Age: 79
End: 2024-04-12
Payer: MEDICARE

## 2024-04-12 ENCOUNTER — HOSPITAL ENCOUNTER (OUTPATIENT)
Dept: GENERAL RADIOLOGY | Facility: HOSPITAL | Age: 79
Discharge: HOME | End: 2024-04-12
Payer: MEDICARE

## 2024-04-12 DIAGNOSIS — M25.562 LEFT KNEE PAIN, UNSPECIFIED CHRONICITY: ICD-10-CM

## 2024-04-12 DIAGNOSIS — M25.561 RIGHT KNEE PAIN, UNSPECIFIED CHRONICITY: Primary | ICD-10-CM

## 2024-04-12 DIAGNOSIS — M25.561 RIGHT KNEE PAIN, UNSPECIFIED CHRONICITY: ICD-10-CM

## 2024-04-12 DIAGNOSIS — Z12.31 SCREENING MAMMOGRAM, ENCOUNTER FOR: Primary | ICD-10-CM

## 2024-04-12 PROCEDURE — 73560 X-RAY EXAM OF KNEE 1 OR 2: CPT

## 2024-05-02 ENCOUNTER — HOSPITAL ENCOUNTER (OUTPATIENT)
Facility: HOSPITAL | Age: 79
Discharge: HOME | End: 2024-05-02
Admitting: NURSE PRACTITIONER
Payer: MEDICARE

## 2024-05-02 DIAGNOSIS — Z12.31 SCREENING MAMMOGRAM, ENCOUNTER FOR: ICD-10-CM

## 2024-05-02 PROCEDURE — 77067 SCR MAMMO BI INCL CAD: CPT | Performed by: RADIOLOGY

## 2024-05-02 PROCEDURE — 77063 BREAST TOMOSYNTHESIS BI: CPT

## 2024-05-02 PROCEDURE — 77067 SCR MAMMO BI INCL CAD: CPT

## 2024-05-02 PROCEDURE — 77063 BREAST TOMOSYNTHESIS BI: CPT | Performed by: RADIOLOGY

## 2024-05-28 ENCOUNTER — HOSPITAL ENCOUNTER (OUTPATIENT)
Dept: GENERAL RADIOLOGY | Facility: HOSPITAL | Age: 79
Discharge: HOME | End: 2024-05-28
Payer: MEDICARE

## 2024-05-28 ENCOUNTER — TRANSCRIBE ORDERS (OUTPATIENT)
Dept: ADMINISTRATIVE | Facility: HOSPITAL | Age: 79
End: 2024-05-28
Payer: MEDICARE

## 2024-05-28 DIAGNOSIS — M54.50 LOW BACK PAIN, UNSPECIFIED BACK PAIN LATERALITY, UNSPECIFIED CHRONICITY, UNSPECIFIED WHETHER SCIATICA PRESENT: Primary | ICD-10-CM

## 2024-05-28 DIAGNOSIS — M54.50 LOW BACK PAIN, UNSPECIFIED BACK PAIN LATERALITY, UNSPECIFIED CHRONICITY, UNSPECIFIED WHETHER SCIATICA PRESENT: ICD-10-CM

## 2024-05-28 PROCEDURE — 72110 X-RAY EXAM L-2 SPINE 4/>VWS: CPT

## 2024-07-08 ENCOUNTER — TRANSCRIBE ORDERS (OUTPATIENT)
Dept: ADMINISTRATIVE | Facility: HOSPITAL | Age: 79
End: 2024-07-08
Payer: MEDICARE

## 2024-07-08 DIAGNOSIS — M54.50 LOW BACK PAIN, UNSPECIFIED BACK PAIN LATERALITY, UNSPECIFIED CHRONICITY, UNSPECIFIED WHETHER SCIATICA PRESENT: Primary | ICD-10-CM

## 2024-07-16 ENCOUNTER — HOSPITAL ENCOUNTER (OUTPATIENT)
Dept: CT IMAGING | Facility: HOSPITAL | Age: 79
Discharge: HOME | End: 2024-07-16
Admitting: PHYSICIAN ASSISTANT
Payer: MEDICARE

## 2024-07-16 DIAGNOSIS — M54.50 LOW BACK PAIN, UNSPECIFIED BACK PAIN LATERALITY, UNSPECIFIED CHRONICITY, UNSPECIFIED WHETHER SCIATICA PRESENT: ICD-10-CM

## 2024-07-16 PROCEDURE — 72131 CT LUMBAR SPINE W/O DYE: CPT

## 2024-07-16 PROCEDURE — 72131 CT LUMBAR SPINE W/O DYE: CPT | Performed by: RADIOLOGY

## 2024-08-28 ENCOUNTER — SPECIALTY PHARMACY (OUTPATIENT)
Dept: PHARMACY | Facility: HOSPITAL | Age: 79
End: 2024-08-28
Payer: MEDICARE

## 2024-08-28 VITALS
DIASTOLIC BLOOD PRESSURE: 79 MMHG | HEART RATE: 73 BPM | BODY MASS INDEX: 26.23 KG/M2 | WEIGHT: 133.6 LBS | SYSTOLIC BLOOD PRESSURE: 162 MMHG | OXYGEN SATURATION: 98 % | HEIGHT: 60 IN

## 2024-08-28 DIAGNOSIS — B18.1 CHRONIC VIRAL HEPATITIS B WITHOUT DELTA AGENT AND WITHOUT COMA: Primary | ICD-10-CM

## 2024-08-28 PROCEDURE — 99213 OFFICE O/P EST LOW 20 MIN: CPT | Performed by: PHYSICIAN ASSISTANT

## 2024-08-28 NOTE — PROGRESS NOTES
No chief complaint on file.    Tyler Cabello is a 79 y.o. female who presents to the office today at the request of Miranda Schmid* for No chief complaint on file..    HPI    She found out about having a positive Hepatitis B test result approx 4 weeks ago. She has not had prior treatment for hepatitis. Reports no known personal history of liver disease including other viral hepatitis. There is no known family history of liver disease or cirrhosis. She denies previous IVDU and intranasal drug use. She does not have nonprofessional tattoos. Denies having previous alcoholism. She does not currently drink alcohol other than once or twice per year. She denies current illicit drug use including marijuana. No recent liver imaging. She has had recent labs. She has had previous vaccinations for Hepatitis A and B. She is currently unemployed. The patient receives support from her ..  Patient is of  descent.    Patient had labs at PCP which revealed:  HBsAg screen negative  Hep B core Ab Total positive    Review of Systems   Constitutional:  Negative for activity change, appetite change and fatigue.   HENT:  Negative for trouble swallowing and voice change.    Respiratory:  Negative for cough and choking.    Cardiovascular:  Negative for leg swelling.   Gastrointestinal:  Negative for abdominal distention, abdominal pain, anal bleeding, blood in stool, constipation, diarrhea, nausea, rectal pain and vomiting.   Endocrine: Negative for polyphagia.   Genitourinary:  Negative for flank pain.   Musculoskeletal:  Positive for arthralgias, back pain and neck pain.   Skin:  Negative for color change and pallor.   Allergic/Immunologic: Negative for food allergies.   Neurological:  Negative for weakness.   Psychiatric/Behavioral:  Negative for confusion. The patient is hyperactive.        ACTIVE PROBLEMS:   Specialty Problems          Cardiology Problems    Mixed hyperlipidemia           PAST MEDICAL HISTORY:  Past  Medical History:   Diagnosis Date    Acquired hypothyroidism 2018    Allergic rhinitis     Asthma     last asthma attack 2-3 month ago, SEASONAL ALLERGIES    Chest pain     RELATED TO GALLBLADDER PER PT    Constipation     Cough     DDD (degenerative disc disease), cervical 2018    Diabetes mellitus     Elevated cholesterol     GERD (gastroesophageal reflux disease)     Hyperlipidemia     Hypertension     Malaise and fatigue     Menopause, premature     Ovarian cancer     PONV (postoperative nausea and vomiting)     RLS (restless legs syndrome)     Swelling, limb        SURGICAL HISTORY:  Past Surgical History:   Procedure Laterality Date    CATARACT EXTRACTION Bilateral     CERVICAL EPIDURAL N/A 01/15/2020    Procedure: CERVICAL EPIDURAL C7/T1;  Surgeon: Tha Rutledge DO;  Location: [facility] OR;  Service: Pain Management     SECTION      COLONOSCOPY      ENDOSCOPY N/A 2018    Procedure: ESOPHAGOGASTRODUODENOSCOPY WITH ANESTHESIA;  Surgeon: Don Tovar MD;  Location: [facility] OR;  Service: Gastroenterology    HYSTERECTOMY  1985    ovarian ca    LUMBAR EPIDURAL INJECTION N/A 2019    Procedure: LUMBAR EPIDURAL 1ST VISIT L4/5;  Surgeon: Tha Rutledge DO;  Location: [facility] OR;  Service: Pain Management    NECK SURGERY      C6-7 ACDF    OVARY SURGERY      [place] LAPAROSCOPY SURG CHOLECYSTECTOMY N/A 2016    Procedure: CHOLECYSTECTOMY LAPAROSCOPIC;  Surgeon: Don Tovar MD;  Location: [facility] OR;  Service: General    SPINAL CORD STIMULATOR IMPLANT N/A 2022    Procedure: SPINAL CORD STIMULATOR INSERTION PHASE 2;  Surgeon: Tha Rutledge DO;  Location: [facility] OR;  Service: Pain Management;  Laterality: N/A;    WRIST SURGERY Right     CARPAL TUNNEL RELEASE       FAMILY HISTORY:  Family History   Problem Relation Age of Onset    Breast cancer Neg Hx        SOCIAL HISTORY:  Social History     Tobacco Use    Smoking status: Former     Current packs/day: 0.50      Average packs/day: 0.5 packs/day for 25.0 years (12.5 total pack years)     Types: Cigarettes     Passive exposure: Past    Smokeless tobacco: Former     Quit date: 7/6/2008    Tobacco comments:     25-30 YEARS   Substance Use Topics    Alcohol use: No       CURRENT MEDICATION:    Current Outpatient Medications:     baclofen (LIORESAL) 10 MG tablet, Take 1 tablet by mouth 3 (Three) Times a Day As Needed for Muscle Spasms., Disp: 90 tablet, Rfl: 3    FLUoxetine (PROzac) 20 MG capsule, Take 1 capsule by mouth Daily., Disp: 30 capsule, Rfl: 5    furosemide (LASIX) 20 MG tablet, Take 1 tablet by mouth Daily As Needed (swelling)., Disp: 90 tablet, Rfl: 3    gabapentin (NEURONTIN) 100 MG capsule, 1 tablet twice daily (Patient taking differently: Take 6 capsules by mouth 3 (Three) Times a Day. 1 tablet twice daily), Disp: 60 capsule, Rfl: 2    HYDROcodone-acetaminophen (NORCO)  MG per tablet, Take 1 tablet by mouth Every 6 (Six) Hours As Needed for Moderate Pain., Disp: , Rfl:     ibuprofen (ADVIL,MOTRIN) 800 MG tablet, Take 1 tablet by mouth Every 8 (Eight) Hours As Needed for Mild Pain ., Disp: 90 tablet, Rfl: 3    linaclotide (LINZESS) 290 MCG capsule capsule, Take 1 capsule by mouth Every Morning Before Breakfast., Disp: 90 capsule, Rfl: 3    montelukast (Singulair) 10 MG tablet, Take 1 tablet by mouth Every Night., Disp: 90 tablet, Rfl: 3    pantoprazole (PROTONIX) 40 MG EC tablet, Take 1 tablet by mouth Daily., Disp: 90 tablet, Rfl: 3    rOPINIRole (REQUIP) 2 MG tablet, Take 1 tablet by mouth Every Night. Take 1 hour before bedtime., Disp: 90 tablet, Rfl: 3    rosuvastatin (CRESTOR) 5 MG tablet, Take 1 tablet by mouth Every Night., Disp: 90 tablet, Rfl: 3    verapamil SR (CALAN-SR) 240 MG CR tablet, Take 1 tablet by mouth Every Night., Disp: 90 tablet, Rfl: 3    cetirizine (zyrTEC) 10 MG tablet, Take 1 tablet by mouth 2 (Two) Times a Day. (Patient not taking: Reported on 8/28/2024), Disp: 180 tablet, Rfl:  "3    docusate sodium (COLACE) 100 MG capsule, Take 1 capsule by mouth 2 (Two) Times a Day. (Patient not taking: Reported on 8/28/2024), Disp: 180 capsule, Rfl: 4    levothyroxine (Synthroid) 50 MCG tablet, Take 1 tablet by mouth Daily. (Patient not taking: Reported on 8/28/2024), Disp: 90 tablet, Rfl: 3    Plecanatide (Trulance) 3 MG tablet, Take 1 tablet by mouth Daily. (Patient not taking: Reported on 8/28/2024), Disp: 7 tablet, Rfl: 0    ALLERGIES:  Shingrix [zoster vac recomb adjuvanted], Sulfa antibiotics, Nickel, Latex, and Lisinopril    VISIT VITALS:  Blood Pressure 162/79 (BP Location: Right arm, Patient Position: Sitting, Cuff Size: Adult)   Pulse 73   Height 152.4 cm (60\")   Weight 60.6 kg (133 lb 9.6 oz)   Last Menstrual Period 07/08/1986 (LMP Unknown)   Oxygen Saturation 98%   Body Mass Index 26.09 kg/m²     PHYSICAL EXAMINATION:  Physical Exam  Constitutional:       General: She is not in acute distress.     Appearance: She is well-developed. She is not diaphoretic.   HENT:      Head: Normocephalic and atraumatic.      Right Ear: External ear normal.      Left Ear: External ear normal.      Nose: Nose normal.      Mouth/Throat:      Pharynx: No oropharyngeal exudate.   Eyes:      General: No scleral icterus.        Right eye: No discharge.         Left eye: No discharge.      Conjunctiva/sclera: Conjunctivae normal.      Pupils: Pupils are equal, round, and reactive to light.   Neck:      Thyroid: No thyromegaly.      Vascular: No JVD.      Trachea: No tracheal deviation.   Cardiovascular:      Rate and Rhythm: Normal rate and regular rhythm.      Heart sounds: Normal heart sounds. No murmur heard.     No friction rub. No gallop.   Pulmonary:      Effort: Pulmonary effort is normal. No respiratory distress.      Breath sounds: Normal breath sounds. No stridor. No wheezing or rales.   Chest:      Chest wall: No tenderness.   Abdominal:      General: Bowel sounds are normal. There is no distension. "      Palpations: Abdomen is soft. There is no mass.      Tenderness: There is no abdominal tenderness. There is no guarding or rebound.      Hernia: No hernia is present.   Genitourinary:     Rectum: Guaiac result negative.   Musculoskeletal:      Cervical back: Normal range of motion and neck supple.   Lymphadenopathy:      Cervical: No cervical adenopathy.   Skin:     General: Skin is warm and dry.      Coloration: Skin is not pale.      Findings: No erythema or rash.   Neurological:      Mental Status: She is alert and oriented to person, place, and time.      Cranial Nerves: No cranial nerve deficit.      Motor: No abnormal muscle tone.      Coordination: Coordination normal.      Deep Tendon Reflexes: Reflexes are normal and symmetric. Reflexes normal.   Psychiatric:         Behavior: Behavior normal.         Thought Content: Thought content normal.         Judgment: Judgment normal.         Assessment & Plan      Diagnosis Plan   1. Chronic viral hepatitis B without delta agent and without coma          The patient's labs from PCP indicate that patient had a past Hep B infection that her body cleared.  However, due to the virus living in her DNA, it has the potential to reactivate later.  Patient was educated on monitoring liver chemistries and how to lower potential of Hep B reactivating.  She voiced understanding.  Return in about 1 week (around 9/4/2024) for Recheck.           Ann Avila PA-C

## 2024-09-12 ENCOUNTER — TRANSCRIBE ORDERS (OUTPATIENT)
Dept: ADMINISTRATIVE | Facility: HOSPITAL | Age: 79
End: 2024-09-12
Payer: MEDICARE

## 2024-09-12 DIAGNOSIS — S32.009A CLOSED FRACTURE OF LUMBAR VERTEBRA, INITIAL ENCOUNTER: Primary | ICD-10-CM

## 2024-09-23 ENCOUNTER — HOSPITAL ENCOUNTER (OUTPATIENT)
Dept: MRI IMAGING | Facility: HOSPITAL | Age: 79
Discharge: HOME | End: 2024-09-23
Admitting: PHYSICIAN ASSISTANT
Payer: MEDICARE

## 2024-09-23 DIAGNOSIS — S32.009A CLOSED FRACTURE OF LUMBAR VERTEBRA, INITIAL ENCOUNTER: ICD-10-CM

## 2024-10-28 ENCOUNTER — TRANSCRIBE ORDERS (OUTPATIENT)
Dept: ADMINISTRATIVE | Facility: HOSPITAL | Age: 79
End: 2024-10-28
Payer: MEDICARE

## 2024-10-28 ENCOUNTER — HOSPITAL ENCOUNTER (OUTPATIENT)
Facility: HOSPITAL | Age: 79
Discharge: HOME | End: 2024-10-28
Admitting: PHYSICIAN ASSISTANT
Payer: MEDICARE

## 2024-10-28 DIAGNOSIS — M54.6 PAIN IN THORACIC SPINE: ICD-10-CM

## 2024-10-28 DIAGNOSIS — M54.6 PAIN IN THORACIC SPINE: Primary | ICD-10-CM

## 2024-10-28 PROCEDURE — 72072 X-RAY EXAM THORAC SPINE 3VWS: CPT | Performed by: RADIOLOGY

## 2024-10-28 PROCEDURE — 72072 X-RAY EXAM THORAC SPINE 3VWS: CPT

## 2025-06-03 NOTE — PROGRESS NOTES
"Cathryn Cabello is a 72 y.o. female.     Chief Complaint   Patient presents with   • Knee Pain       History of Present Illness     Patient is here today with bilateral knee pain.  X-rays show osteoarthritis.  Patient is requesting joint injections in both knees.    Cough and sinus pressure present for several weeks.  Three-way cough syrup did help some.  Now she is having sinus drainage and pressure in her ears.  Moderate intensity.      The following portions of the patient's history were reviewed and updated as appropriate: allergies, current medications, past family history, past medical history, past social history, past surgical history and problem list.    Review of Systems   Constitutional: Positive for activity change and fatigue. Negative for chills and fever.   HENT: Positive for ear pain, sinus pain and sinus pressure.    Eyes: Positive for visual disturbance.   Respiratory: Positive for cough. Negative for shortness of breath and wheezing.    Cardiovascular: Positive for leg swelling (at random times, worse with prolonged sitting or standing ). Negative for chest pain and palpitations.   Gastrointestinal: Positive for constipation.   Endocrine: Negative.    Genitourinary: Negative.    Musculoskeletal: Positive for arthralgias, back pain, joint swelling and neck pain. Negative for neck stiffness.   Skin: Negative.    Allergic/Immunologic: Positive for environmental allergies.   Neurological: Positive for headaches (sinus ).   Hematological: Bruises/bleeds easily.   Psychiatric/Behavioral: Negative for self-injury and suicidal ideas.       Objective     /80  Pulse 80  Temp 98.4 °F (36.9 °C) (Tympanic)   Ht 152.4 cm (60\")  Wt 60.8 kg (134 lb)  LMP 07/08/1986 (Within Months)  SpO2 98%  BMI 26.17 kg/m2  Lab on 02/13/2018   Component Date Value Ref Range Status   • Glucose 02/13/2018 93  70 - 110 mg/dL Final   • BUN 02/13/2018 14  7 - 21 mg/dL Final   • Creatinine 02/13/2018 0.92  0.43 " - 1.29 mg/dL Final   • Sodium 02/13/2018 143  135 - 153 mmol/L Final   • Potassium 02/13/2018 4.2  3.5 - 5.3 mmol/L Final   • Chloride 02/13/2018 107  99 - 112 mmol/L Final   • CO2 02/13/2018 29.0  24.3 - 31.9 mmol/L Final   • Calcium 02/13/2018 9.4  7.7 - 10.0 mg/dL Final   • Total Protein 02/13/2018 7.0  6.0 - 8.0 g/dL Final   • Albumin 02/13/2018 4.60  3.40 - 4.80 g/dL Final   • ALT (SGPT) 02/13/2018 45* 10 - 36 U/L Final   • AST (SGOT) 02/13/2018 28  10 - 30 U/L Final   • Alkaline Phosphatase 02/13/2018 55  35 - 104 U/L Final   • Total Bilirubin 02/13/2018 0.4  0.2 - 1.8 mg/dL Final   • eGFR Non African Amer 02/13/2018 60* >60 mL/min/1.73 Final   • Globulin 02/13/2018 2.4  gm/dL Final   • A/G Ratio 02/13/2018 1.9  1.5 - 2.5 g/dL Final   • BUN/Creatinine Ratio 02/13/2018 15.2  7.0 - 25.0 Final   • Anion Gap 02/13/2018 7.0  3.6 - 11.2 mmol/L Final   • TSH 02/13/2018 5.746* 0.550 - 4.780 mIU/mL Final   • Hemoglobin A1C 02/13/2018 6.10* 4.50 - 5.70 % Final   • 25 Hydroxy, Vitamin D 02/13/2018 41.0  ng/ml Final   • Microalbumin, Urine 02/13/2018 3.3  mg/L Final   • Total Cholesterol 02/13/2018 159  0 - 200 mg/dL Final   • Triglycerides 02/13/2018 108  0 - 150 mg/dL Final   • HDL Cholesterol 02/13/2018 49* 60 - 100 mg/dL Final   • LDL Cholesterol  02/13/2018 88  0 - 100 mg/dL Final   • VLDL Cholesterol 02/13/2018 21.6  mg/dL Final   • LDL/HDL Ratio 02/13/2018 1.80   Final   • Vitamin B-12 02/13/2018 1623* 211 - 911 pg/mL Final   • WBC 02/13/2018 5.61  4.50 - 12.50 10*3/mm3 Final   • RBC 02/13/2018 4.34  4.20 - 5.40 10*6/mm3 Final   • Hemoglobin 02/13/2018 13.5  12.0 - 16.0 g/dL Final   • Hematocrit 02/13/2018 40.2  37.0 - 47.0 % Final   • MCV 02/13/2018 92.6  80.0 - 94.0 fL Final   • MCH 02/13/2018 31.1  27.0 - 33.0 pg Final   • MCHC 02/13/2018 33.6  33.0 - 37.0 g/dL Final   • RDW 02/13/2018 12.7  11.5 - 14.5 % Final   • RDW-SD 02/13/2018 41.8  37.0 - 54.0 fl Final   • MPV 02/13/2018 10.9* 6.0 - 10.0 fL Final   •  No Platelets 02/13/2018 256  130 - 400 10*3/mm3 Final   • Neutrophil % 02/13/2018 46.4  40.0 - 75.0 % Final   • Lymphocyte % 02/13/2018 36.5  16.0 - 46.0 % Final   • Monocyte % 02/13/2018 12.3* 0.0 - 12.0 % Final   • Eosinophil % 02/13/2018 4.3  0.0 - 7.0 % Final   • Basophil % 02/13/2018 0.5  0.0 - 2.0 % Final   • Immature Grans % 02/13/2018 0.0  0.0 - 0.5 % Final   • Neutrophils, Absolute 02/13/2018 2.60  1.40 - 6.50 10*3/mm3 Final   • Lymphocytes, Absolute 02/13/2018 2.05  1.00 - 3.00 10*3/mm3 Final   • Monocytes, Absolute 02/13/2018 0.69  0.10 - 0.90 10*3/mm3 Final   • Eosinophils, Absolute 02/13/2018 0.24  0.00 - 0.70 10*3/mm3 Final   • Basophils, Absolute 02/13/2018 0.03  0.00 - 0.30 10*3/mm3 Final   • Immature Grans, Absolute 02/13/2018 0.00  0.00 - 0.03 10*3/mm3 Final   • Osmolality Calc 02/13/2018 285.1  273.0 - 305.0 mOsm/kg Final       Physical Exam   Constitutional: She is oriented to person, place, and time. She appears well-developed and well-nourished. No distress.   HENT:   Head: Atraumatic.   Right Ear: Tympanic membrane is bulging.   Left Ear: Tympanic membrane is bulging.   Nose: Right sinus exhibits maxillary sinus tenderness. Left sinus exhibits maxillary sinus tenderness and frontal sinus tenderness.   Mouth/Throat: Oropharyngeal exudate present. No posterior oropharyngeal edema or posterior oropharyngeal erythema.   Eyes: Pupils are equal, round, and reactive to light.   Neck: Neck supple.   Cardiovascular: Normal rate, regular rhythm and normal heart sounds.    Pulmonary/Chest: Effort normal and breath sounds normal. No respiratory distress. She has no wheezes.   Abdominal: Soft. Bowel sounds are normal.   Musculoskeletal:        Right knee: She exhibits decreased range of motion. She exhibits no erythema. Tenderness found. Medial joint line tenderness noted.        Left knee: She exhibits decreased range of motion. Tenderness found. Medial joint line tenderness noted.        Cervical back: She  "exhibits bony tenderness and pain.        Lumbar back: She exhibits tenderness.   Procedure: Large joint injection of right and left Knees  intra-articularly today  The procedure risks and benefits were explained to patient and patient gave verbal consent to have the procedure performed.  Indication:  Knee pain and osteoarthritis  Provider: ZHOU Moncada   Description: The right and left knee  was prepped and draped in sterile fashion.  An injection was given into the joint intramuscularly using 3 cc of 1% lidocaine, 1 cc Decadron, and 1 cc of Kenalog. Injected lateral side.  Pressure was held and sterile dressings were placed.  No complications.   Assessment blood loss: Minimal  Patient tolerated the procedure well.     1% lidocaine 500 mg per 50 mL NDC #[id number]  Kenalog 40 mg per 1 mL NDC # 2636-8936-71  Decadron 4 mg per 1 mL NDC # 79024-468-35   Lymphadenopathy:     She has no cervical adenopathy.   Neurological: She is alert and oriented to person, place, and time.   Skin: Skin is warm and dry. No rash noted. She is not diaphoretic. No erythema.   Psychiatric: She has a normal mood and affect. Her speech is normal and behavior is normal. Judgment and thought content normal. Cognition and memory are normal.   Vitals reviewed.      Assessment/Plan     Problem List Items Addressed This Visit        Musculoskeletal and Integument    Chronic pain of both knees    Current Assessment & Plan     Patient has been educated today regarding procedure.  We have discussed the risk versus benefits of this procedure including \"red man\" syndrome, infection, injury, discomfort and allergic reaction.  Patient is aware that this is not an all inclusive list of possible side effects.  We have discussed alternative treatments.  The patient verbalizes understanding of this information.  Their questions were sought and answered.  Patient verbalizes consent to proceed with procedure today.  I have discussed with the " patient that full benefit of joint injection may not be felt for several weeks.  They may experience soreness but should not experience increased pain.  Remain mobile and perform range of motion to the affected joint often throughout the day but avoid overuse for the next several days.  Return to usual activity in one week.  Patient has been advised to call the office or return to the office with any questions.  We have also discussed reasons to seek urgent or emergent medical care.  Patient states understanding of all instructions.           Other Visit Diagnoses     Acute recurrent maxillary sinusitis    -  Primary    Relevant Medications    azithromycin (ZITHROMAX Z-KELLEE) 250 MG tablet    pseudoephedrine-guaifenesin (MUCINEX D)  MG per 12 hr tablet        Recent radiology reviewed and discussed.  Fluids, rest, symptomatic treatment advised. Steam therapy. Dispose of tooth bush after 24 hours of starting antibiotics if ordered. Complete antibiotics as ordered.  Discussed possible side effects/interactions of medication. Discussed symptoms to report as well as reasons to seek urgent or emergent medical attention. Understanding stated.   Recommend follow up in 2-3 days if not improved, sooner if needed.   I have discussed diagnosis in detail today allowing time for questions and answers. Pt is aware of reasons to seek urgent or emergent medical care as well as reasons to return to the clinic for evaluation. Possible side effects, interactions and progression of symptoms discussed as well. Pt / family states understanding.   Routine follow up in 2-3 months, sooner if needed.              This document has been electronically signed by:  ZHOU Moncada, NP-C

## 2025-06-06 ENCOUNTER — HOSPITAL ENCOUNTER (OUTPATIENT)
Dept: MAMMOGRAPHY | Facility: HOSPITAL | Age: 80
Discharge: HOME | End: 2025-06-06
Admitting: NURSE PRACTITIONER
Payer: MEDICARE

## 2025-06-06 DIAGNOSIS — Z12.31 VISIT FOR SCREENING MAMMOGRAM: ICD-10-CM

## 2025-06-06 PROCEDURE — 77063 BREAST TOMOSYNTHESIS BI: CPT

## 2025-06-06 PROCEDURE — 77067 SCR MAMMO BI INCL CAD: CPT

## 2025-06-06 PROCEDURE — 77067 SCR MAMMO BI INCL CAD: CPT | Performed by: RADIOLOGY

## 2025-06-06 PROCEDURE — 77063 BREAST TOMOSYNTHESIS BI: CPT | Performed by: RADIOLOGY

## (undated) DEVICE — DBD-DRAPE,LAP,CHOLE,W/TROUGHS,STERILE: Brand: MEDLINE

## (undated) DEVICE — GOWN,REINF,POLY,ECL,PP SLV,XL: Brand: MEDLINE

## (undated) DEVICE — SINGLE PORT MANIFOLD: Brand: NEPTUNE 2

## (undated) DEVICE — GLV SURG SENSICARE MICRO PF LF 8 STRL

## (undated) DEVICE — NDL BLNT 18G 1 1/2IN

## (undated) DEVICE — SYR LL TP 10ML STRL

## (undated) DEVICE — Device

## (undated) DEVICE — SUT SILK 2/0 SH 30IN K833H

## (undated) DEVICE — APPL CHG2PCT ALC70PCT 3ML ORNG

## (undated) DEVICE — NDL SPINE 25G 31/2IN BLU

## (undated) DEVICE — NDL HYPO ECLPS SFTY 25G 1 1/2IN

## (undated) DEVICE — SYR LUERLOK 5CC

## (undated) DEVICE — NDL HYPO ECLPS SFTY 18G 1 1/2IN

## (undated) DEVICE — DRSNG TELFA PAD NONADH STR 1S 3X4IN

## (undated) DEVICE — DRP C/ARM W/BAND W/CLIPS 41X74IN

## (undated) DEVICE — Device: Brand: DEFENDO AIR/WATER/SUCTION AND BIOPSY VALVE

## (undated) DEVICE — DRAPE,UTILTY,TAPE,15X26, 4EA/PK: Brand: MEDLINE

## (undated) DEVICE — SHEET,DRAPE,53X77,STERILE: Brand: MEDLINE

## (undated) DEVICE — BNDG ADHS CURAD FLX/FABRC 1X3IN STRL LF

## (undated) DEVICE — SUT VIC 2/0 UR6 27IN J602H

## (undated) DEVICE — PK BASIC 70

## (undated) DEVICE — SYR LL 3CC

## (undated) DEVICE — 3M™ IOBAN™ 2 ANTIMICROBIAL INCISE DRAPE 6650EZ: Brand: IOBAN™ 2

## (undated) DEVICE — TUNNELING TOOL KIT, 35CM: Brand: NEVRO®

## (undated) DEVICE — SUT VICRYL 1 CT1 27IN J261H

## (undated) DEVICE — FRCP BX RADJAW4 NDL 2.8 240CM LG OG BX40

## (undated) DEVICE — INTRO SHEATH FLX 6F65CM

## (undated) DEVICE — ANTIBACTERIAL UNDYED BRAIDED (POLYGLACTIN 910), SYNTHETIC ABSORBABLE SUTURE: Brand: COATED VICRYL

## (undated) DEVICE — DRSNG WND GZ PAD BORDERED LF 4X5IN STRL

## (undated) DEVICE — TUBING, SUCTION, 1/4" X 20', STRAIGHT: Brand: MEDLINE INDUSTRIES, INC.

## (undated) DEVICE — PATIENT RETURN ELECTRODE, SINGLE-USE, CONTACT QUALITY MONITORING, ADULT, WITH 9FT CORD, FOR PATIENTS WEIGING OVER 33LBS. (15KG): Brand: MEGADYNE

## (undated) DEVICE — DRSNG WND BORDR/ADHS NONADHR/GZ LF 4X4IN STRL

## (undated) DEVICE — SYR LUERLOK 30CC

## (undated) DEVICE — SYR LUERLOK 50ML

## (undated) DEVICE — APPL CHLORAPREP HI/LITE 26ML ORNG

## (undated) DEVICE — GOWN IMPERV 2XL BLU CA/50

## (undated) DEVICE — THE BITE BLOCK MAXI, LATEX FREE STRAP IS USED TO PROTECT THE ENDOSCOPE INSERTION TUBE FROM BEING BITTEN BY THE PATIENT.

## (undated) DEVICE — NDL EPID PERIFIX TUOHY 18G 6IN